# Patient Record
Sex: FEMALE | Race: WHITE | NOT HISPANIC OR LATINO | Employment: OTHER | URBAN - METROPOLITAN AREA
[De-identification: names, ages, dates, MRNs, and addresses within clinical notes are randomized per-mention and may not be internally consistent; named-entity substitution may affect disease eponyms.]

---

## 2018-09-24 ENCOUNTER — TELEPHONE (OUTPATIENT)
Dept: CARDIOLOGY | Facility: CLINIC | Age: 68
End: 2018-09-24

## 2018-09-24 NOTE — TELEPHONE ENCOUNTER
Referral- Heart Failure    DEMOGRAPHICS       Demographic Information on Edda Mckeon:    Edda Mckeon  Gender: female  : 1950  [address]  [facility] [id number] [phone] (home)     Medical Record: [id number]  Social Security Number: [id number]  Pharmacy: Data Unavailable  Primary Care Provider: Emi Monk    Parent's names are: Data Unavailable (mother) and Data Unavailable (father).    Insurance: Payor: BCBS / Plan: BCBS PLATINUM BLUE / Product Type: PPO /       REFERRAL INFORMATION       Referring Provider: Angelic Leonard  Referring Clinic: [facility]  Referring Contact Info: Clinic Phone: [phone] Fax: [phone]  Referring Diagnosis: Sever Persistent Asthma/bronchiectasis with MR, valvular heart disease.   Referring Information: Wondering how to manage heart disease.   Urgency of Referral: Non-Urgent  SPOC Notes: Keanu and Yola from  looked at this and figured it would be more a HF thing, so they forwarded information. Patient was referred to Dr. Sousa.     RECORDS REQUESTED FROM:       Clinic name Records Requested Records Status Imaging Status   [facility]  Received Received                   PROBLEM/MEDICATION LIST:           Patient Reported Sx          Symptom Questions  Answer  Additional Comments    Any Swelling   YES About 4 weeks ago is no longer.    Weight Changes (up or down)    no      Loss of Appetite/Bloated   YES     Nausea/Vomiting   no     Dizziness or Lightheadedness   YES     Fatigue   YES      Weakness     Yes      Tachycardia/Racing/Throbbing   no     Mental Dullness or Fogginess   YES     Cough/Hack   YES But attributes it to her bronchiectasis   Shortness of Breath   YES  But attributes it to her bronchiectasis   SOB Worse When Laying Down   no     Do you Sleep in Bed and If Yes How Many Pillows    1 Uses C-PAP   Chest Pain no    RED FLAG ITEMS     Ever been on IV inotropes no    Are they still on inotropes no    What's their must  current EF 65%    What's the LVIDd on echo 2.5cm    What's their last blood sodium 136    What's their most recent /85    What's their most recent HR 64    Hospitalizations X6m              PLAN:

## 2018-09-24 NOTE — TELEPHONE ENCOUNTER
[facility] Call Center    Phone Message    May a detailed message be left on voicemail: yes    Reason for Call: Other: Patient called in regarding a referral that was received on 9/17 from [facility]. Dr. Angelic Leonard is referring her to see Dr. Bonilla for 'severe ashtma/bronchiectasis with MR, valvular heart disease, ? how best ot manage heart disease'. The referral was forwarded to Cardiololgy for scheduling on 9/17, but patient has not heard anything yet. Please advise and reach out to patient directly for scheduling at [phone].     Action Taken: Message routed to:  [facility] Cardiology Clinic

## 2018-10-08 ENCOUNTER — PATIENT OUTREACH (OUTPATIENT)
Dept: CARE COORDINATION | Facility: CLINIC | Age: 68
End: 2018-10-08

## 2018-10-16 DIAGNOSIS — I34.0 MITRAL REGURGITATION: ICD-10-CM

## 2018-10-16 DIAGNOSIS — I50.9 CHF (CONGESTIVE HEART FAILURE) (H): ICD-10-CM

## 2018-10-16 DIAGNOSIS — I38 VALVULAR HEART DISEASE: Primary | ICD-10-CM

## 2018-10-16 DIAGNOSIS — R60.0 LOWER EXTREMITY EDEMA: ICD-10-CM

## 2018-10-16 DIAGNOSIS — J47.9 BRONCHIECTASIS (H): ICD-10-CM

## 2018-10-16 PROBLEM — J45.909 SEVERE ASTHMA: Status: ACTIVE | Noted: 2018-10-16

## 2018-10-22 ENCOUNTER — OFFICE VISIT (OUTPATIENT)
Dept: CARDIOLOGY | Facility: CLINIC | Age: 68
End: 2018-10-22
Attending: INTERNAL MEDICINE
Payer: MEDICARE

## 2018-10-22 VITALS
HEART RATE: 63 BPM | WEIGHT: 169.2 LBS | BODY MASS INDEX: 31.95 KG/M2 | HEIGHT: 61 IN | DIASTOLIC BLOOD PRESSURE: 78 MMHG | SYSTOLIC BLOOD PRESSURE: 137 MMHG | OXYGEN SATURATION: 95 %

## 2018-10-22 DIAGNOSIS — I34.0 MITRAL VALVE INSUFFICIENCY, UNSPECIFIED ETIOLOGY: Primary | ICD-10-CM

## 2018-10-22 DIAGNOSIS — I38 VALVULAR HEART DISEASE: ICD-10-CM

## 2018-10-22 DIAGNOSIS — I50.9 CHF (CONGESTIVE HEART FAILURE) (H): ICD-10-CM

## 2018-10-22 DIAGNOSIS — I50.32 CHRONIC DIASTOLIC HEART FAILURE (H): ICD-10-CM

## 2018-10-22 LAB
ANION GAP SERPL CALCULATED.3IONS-SCNC: 9 MMOL/L (ref 3–14)
BUN SERPL-MCNC: 10 MG/DL (ref 7–30)
CALCIUM SERPL-MCNC: 9 MG/DL (ref 8.5–10.1)
CHLORIDE SERPL-SCNC: 107 MMOL/L (ref 94–109)
CO2 SERPL-SCNC: 24 MMOL/L (ref 20–32)
CREAT SERPL-MCNC: 0.66 MG/DL (ref 0.52–1.04)
ERYTHROCYTE [DISTWIDTH] IN BLOOD BY AUTOMATED COUNT: 13.9 % (ref 10–15)
GFR SERPL CREATININE-BSD FRML MDRD: 88 ML/MIN/1.7M2
GLUCOSE SERPL-MCNC: 85 MG/DL (ref 70–99)
HCT VFR BLD AUTO: 43.5 % (ref 35–47)
HGB BLD-MCNC: 14 G/DL (ref 11.7–15.7)
MCH RBC QN AUTO: 32.5 PG (ref 26.5–33)
MCHC RBC AUTO-ENTMCNC: 32.2 G/DL (ref 31.5–36.5)
MCV RBC AUTO: 101 FL (ref 78–100)
NT-PROBNP SERPL-MCNC: 96 PG/ML (ref 0–125)
PLATELET # BLD AUTO: 212 10E9/L (ref 150–450)
POTASSIUM SERPL-SCNC: 4.4 MMOL/L (ref 3.4–5.3)
RBC # BLD AUTO: 4.31 10E12/L (ref 3.8–5.2)
SODIUM SERPL-SCNC: 139 MMOL/L (ref 133–144)
TSH SERPL DL<=0.005 MIU/L-ACNC: 3.83 MU/L (ref 0.4–4)
WBC # BLD AUTO: 8 10E9/L (ref 4–11)

## 2018-10-22 PROCEDURE — 99203 OFFICE O/P NEW LOW 30 MIN: CPT | Mod: GC | Performed by: INTERNAL MEDICINE

## 2018-10-22 PROCEDURE — G0463 HOSPITAL OUTPT CLINIC VISIT: HCPCS | Mod: ZF

## 2018-10-22 PROCEDURE — 80048 BASIC METABOLIC PNL TOTAL CA: CPT | Performed by: INTERNAL MEDICINE

## 2018-10-22 PROCEDURE — 83880 ASSAY OF NATRIURETIC PEPTIDE: CPT | Performed by: INTERNAL MEDICINE

## 2018-10-22 PROCEDURE — 85027 COMPLETE CBC AUTOMATED: CPT | Performed by: INTERNAL MEDICINE

## 2018-10-22 PROCEDURE — 84443 ASSAY THYROID STIM HORMONE: CPT | Performed by: INTERNAL MEDICINE

## 2018-10-22 RX ORDER — ONDANSETRON 4 MG/1
TABLET, ORALLY DISINTEGRATING ORAL
Refills: 0 | COMMUNITY
Start: 2017-11-19 | End: 2022-09-18

## 2018-10-22 RX ORDER — FUROSEMIDE 20 MG
TABLET ORAL
Refills: 0 | COMMUNITY
Start: 2018-08-30 | End: 2022-09-18

## 2018-10-22 RX ORDER — ROSUVASTATIN CALCIUM 40 MG/1
40 TABLET, COATED ORAL DAILY
COMMUNITY
Start: 2018-02-16

## 2018-10-22 RX ORDER — SODIUM CHLORIDE FOR INHALATION 3 %
VIAL, NEBULIZER (ML) INHALATION
Refills: 3 | COMMUNITY
Start: 2017-12-06 | End: 2022-09-18

## 2018-10-22 RX ORDER — CYANOCOBALAMIN (VITAMIN B-12) 500 MCG
LOZENGE ORAL
COMMUNITY
End: 2022-09-18

## 2018-10-22 RX ORDER — CHLORAL HYDRATE 500 MG
2 CAPSULE ORAL EVERY OTHER DAY
COMMUNITY

## 2018-10-22 RX ORDER — CHOLECALCIFEROL (VITAMIN D3) 50 MCG
2000 TABLET ORAL DAILY
COMMUNITY

## 2018-10-22 RX ORDER — POLYVINYL ALCOHOL 14 MG/ML
2 SOLUTION/ DROPS OPHTHALMIC PRN
COMMUNITY

## 2018-10-22 RX ORDER — VALACYCLOVIR HYDROCHLORIDE 1 G/1
1000 TABLET, FILM COATED ORAL
COMMUNITY
Start: 2011-11-14 | End: 2022-09-18

## 2018-10-22 RX ORDER — LEVOTHYROXINE SODIUM 75 UG/1
75 TABLET ORAL DAILY
COMMUNITY
Start: 2018-04-26

## 2018-10-22 RX ORDER — TRETINOIN 0.25 MG/G
CREAM TOPICAL
COMMUNITY
Start: 2013-11-07 | End: 2022-09-18

## 2018-10-22 RX ORDER — PREDNISONE 20 MG/1
TABLET ORAL
Refills: 0 | COMMUNITY
Start: 2018-06-06 | End: 2022-09-18

## 2018-10-22 RX ORDER — MULTIVITAMIN
1 TABLET ORAL DAILY
COMMUNITY
Start: 2007-12-03

## 2018-10-22 RX ORDER — NYSTATIN 100000/ML
SUSPENSION, ORAL (FINAL DOSE FORM) ORAL
Refills: 0 | COMMUNITY
Start: 2018-06-14 | End: 2022-09-18

## 2018-10-22 RX ORDER — IPRATROPIUM BROMIDE AND ALBUTEROL SULFATE 2.5; .5 MG/3ML; MG/3ML
3 SOLUTION RESPIRATORY (INHALATION)
COMMUNITY
Start: 2018-09-12 | End: 2022-09-18

## 2018-10-22 RX ORDER — ALBUTEROL SULFATE 90 UG/1
1-2 AEROSOL, METERED RESPIRATORY (INHALATION) EVERY 6 HOURS PRN
COMMUNITY

## 2018-10-22 RX ORDER — OXYCODONE AND ACETAMINOPHEN 5; 325 MG/1; MG/1
TABLET ORAL
Refills: 0 | COMMUNITY
Start: 2017-11-19 | End: 2022-09-18

## 2018-10-22 RX ORDER — FLUTICASONE PROPIONATE 50 MCG
2 SPRAY, SUSPENSION (ML) NASAL
COMMUNITY
Start: 2018-09-12 | End: 2022-09-18

## 2018-10-22 RX ORDER — CIPROFLOXACIN 500 MG/1
TABLET, FILM COATED ORAL
Refills: 0 | COMMUNITY
Start: 2017-11-19 | End: 2022-09-18

## 2018-10-22 RX ORDER — ASCORBIC ACID 500 MG
500 TABLET ORAL
COMMUNITY
End: 2022-09-18

## 2018-10-22 ASSESSMENT — PAIN SCALES - GENERAL: PAINLEVEL: MILD PAIN (2)

## 2018-10-22 NOTE — MR AVS SNAPSHOT
After Visit Summary   10/22/2018    Edda Mckeon    MRN: [id number]           Patient Information     Date Of Birth          1950        Visit Information        Provider Department      10/22/2018 1:00 PM Chad Bonilla MD [facility]        Today's Diagnoses     Mitral valve insufficiency, unspecified etiology    -  1    Diastolic heart failure (H)        Valvular heart disease        Carotid artery stenosis          Care Instructions    You were seen today in the Cardiovascular Clinic at the [facility].       Cardiology Providers you saw during your visit: Dr. Bonilla         Medication Changes:   1. No medication changes       Patient Instructions:  1. We will schedule you for the following testin: CardioPulmonary Stress Test (CPX)  CPX is a maximal (meaning you exercise to exhaustion, not to achieve a heart rate) exercise test where we measure how well your heart/body uses oxygen or energy. It is the gold standard for measuring functional capacity and helps us differentiate limitations due to lungs, heart, or fitness.   A CPX is NOT a typical stress test. You will NOT be asked to hold your Beta Blocker medication.   You will be scheduled for a CardioPulmonary Stress Test at the [facility] ([address], [facility] [address], [phone]).  Follow these instructions:    1. Report to the GOLD waiting room in the [facility] hospital.    2. Nothing to eat for 3 hours prior to your test. You may have clear liquids up to the time of your test    3. Please wear loose, two-piece clothing and comfortable, rubber soled shoes for walking     2. NM Lexiscan:     Cristal Nuclear Stress Test     A Cristal Nuclear Stress Test is an imaging test that measures the flow of blood in your heart at rest and then during exercise. The images are compared to determine whether there are any blockages in the arteries, changes in blood flow or oxygen supply from resting  to the stressed state, areas of scar tissue, or if there has been a prior heart attack. It also measures how well the heart muscle squeezes and pumps.     Unlike other stress tests, you will NOT hold your beta blocker medication for this test.     You are scheduled for a Cristal Stress Test at the [facility].   Please report to the [facility] Waiting Room in the [facility].     Follow these instructions:     This test can take up to 3 hours.     Nothing to eat or drink for 3 hours before test except for water.   No caffeine, tobacco, or alcohol for 12 hrs before test.   Do not take nitrates on the day of your test. Do not wear your Nitro-Patch    3. Carotid Ultrasound  4. Echocardiogram ( I will try to get the images from [facility] and if I am able to, I will cancel the echo and let you know).  5. We will check your thyroid from your labs today.    Follow up Appointment Information:  1. Come back in 4-6 weeks for follow up appointment to discuss results.      Results:    Results for MIGUEL MORRISSEY (MRN [id number]) as of 10/22/2018 14:10   Ref. Range 10/22/2018 12:01   Sodium Latest Ref Range: 133 - 144 mmol/L 139   Potassium Latest Ref Range: 3.4 - 5.3 mmol/L 4.4   Chloride Latest Ref Range: 94 - 109 mmol/L 107   Carbon Dioxide Latest Ref Range: 20 - 32 mmol/L 24   Urea Nitrogen Latest Ref Range: 7 - 30 mg/dL 10   Creatinine Latest Ref Range: 0.52 - 1.04 mg/dL 0.66   GFR Estimate Latest Ref Range: >60 mL/min/1.7m2 88   GFR Estimate If Black Latest Ref Range: >60 mL/min/1.7m2 >90   Calcium Latest Ref Range: 8.5 - 10.1 mg/dL 9.0   Anion Gap Latest Ref Range: 3 - 14 mmol/L 9   N-Terminal Pro Bnp Latest Ref Range: 0 - 125 pg/mL 96   Glucose Latest Ref Range: 70 - 99 mg/dL 85   WBC Latest Ref Range: 4.0 - 11.0 10e9/L 8.0   Hemoglobin Latest Ref Range: 11.7 - 15.7 g/dL 14.0   Hematocrit Latest Ref Range: 35.0 - 47.0 % 43.5   Platelet Count Latest Ref Range: 150 - 450 10e9/L 212   RBC Count  "Latest Ref Range: 3.8 - 5.2 10e12/L 4.31   MCV Latest Ref Range: 78 - 100 fl 101 (H)   MCH Latest Ref Range: 26.5 - 33.0 pg 32.5   MCHC Latest Ref Range: 31.5 - 36.5 g/dL 32.2   RDW Latest Ref Range: 10.0 - 15.0 % 13.9         [address] [facility] [place] [address] [place] [address]        Thank you for allowing us to be a part of your care here at the [facility]      If you have questions or concerns please contact us at:      Norah Guajardo RN BSN   Cardiology Care Coordinator  [facility]   Questions and [phone]   First press #1 for the University and then press #3 for \"Medical Advice\" to reach us Cardiology Nurses.                Follow-ups after your visit        Additional Services     Follow-Up with Advanced Heart Failure Cardiologist                 Your next 10 appointments already scheduled     2018  8:00 AM CDT   NM INJECTION with UUNMINJ1   [facility], Nuclear Medicine ([facility])    [address] [facility] [address]   [phone]            2018  8:45 AM CDT   NM SCAN3 with UUNM1   [facility], Nuclear Medicine ([facility])    [address] [facility] [address] [phone]            2018  9:15 AM CDT   Ekg Stress Nm Lexiscan with UUEKGNMS   [facility] ([facility])    [address] [place] [id number]               2018 10:15 AM CDT   NM MPI WITH LEXISCAN with UUNM1   [facility], Nuclear Medicine ([facility])    [address] [facility] [address] [phone]           How do I prepare for my exam? (Food and drink instructions) Day 1 & Day 2: Stop all caffeine 12 hours before the test. This includes coffee, tea, soda pop, chocolate and certain medicines (such as " Anacin, Excedrin and NoDoz). Also avoid decaf coffee and tea, as these contain small amounts of caffeine. Stop eating 4 hours before the test. You may drink water.  How do I prepare for my exam? (Other instructions) You may need to stop some medicines before the test. Follow your doctor s orders. Day 1 & Day 2: *If you take a beta blocker: Do not take your beta-blocker on the day before your test, unless specifically told to by your doctor. And do not take it on the day of your test. Bring it with you to take after the test.  *If you take Aggrenox or dipyridamole (Persantine, Permole), stop taking it 48 hours before your test. *If you take Viagra, Cialis or Levitra, stop taking it 48 hours before your test. *If you take theophylline or aminophylline, stop taking it 12 hours before your test.  For patients with diabetes: *If you take insulin, call your diabetes care team. Ask if you should take a 1/2 dose the morning of your test. *If you take diabetes medicine by mouth, don t take it on the morning of your test. Bring it with you to take after the test. (If you have questions, call your diabetes care team.)  *Do not take nitrates on the day of your test. Do not wear your Nitro-Patch. *No alcohol, smoking or other tobacco for 12 hours before the test.  What should I wear: Please wear a loose two-piece outfit. If you will have an exercise test, bring rubber-soled walking shoes.  How long does the exam take: *This test can take 1-2 days.* ONE day exam: Allow 3-4 hours for test. IF TWO day exam: Allow 30-60 minutes PER day for test.  What should I bring: Please bring a list of your medicines (including vitamins, minerals and over-the-counter drugs). Leave your valuables at home.  Do I need a :  No  is needed.  What do I need to tell my doctor? When you arrive, please tell us if you: * Have diabetes * Are breastfeeding * May be pregnant * Have a pacemaker of ICD (implantable defibrillator).  What should I do  after the exam: No restrictions, You may resume normal activities.  What is this test: Your doctor has ordered a nuclear stress test to check how well blood is flowing through your heart. You will either exercise or take a medicine that mimics exercise; we will watch your heart.  Who should I call with questions: If you have any questions, please call the Imaging Department where you will have your exam. Directions, parking instructions, and other information is available on our website, [email or web address].            Nov 06, 2018  8:30 AM CST   Card Cardpul Stress Tst Adult with UUEKGS   [facility] ([facility])    [address] [facility] [address]               Nov 06, 2018 10:30 AM CST   US CAROTID BILATERAL with UUUS2   [facility] ([facility])    [address] [facility] [id number] [phone]           How do I prepare for my exam? (Food and drink instructions) No Food and Drink Restrictions.  How do I prepare for my exam? (Other instructions) You do not need to do anything special to prepare for your exam.  What should I wear: Wear comfortable clothes.  How long does the exam take: Most ultrasounds take 30 to 60 minutes.  What should I bring: Bring a list of your medicines, including vitamins, minerals and over-the-counter drugs. It is safest to leave personal items at home.  Do I need a :  No  is needed.  What do I need to tell my doctor: Tell your doctor about any allergies you may have.  What should I do after the exam: No restrictions, You may resume normal activities.  What is this test: An ultrasound uses sound waves to make pictures of the body. Sound waves do not cause pain. The only discomfort may be the pressure of the wand against your skin or full bladder.  Who should I call with questions: If you have any questions, please call the Imaging Department  "where you will have your exam. Directions, parking instructions, and other information is available on our website, [email or web address].            Nov 26, 2018  4:00 PM CST   (Arrive by 3:45 PM)   Advanced Heart Failure with Chad Bonilla MD   [facility] ([facility])    [address] [address]   [phone]              Future tests that were ordered for you today     Open Future Orders        Priority Expected Expires Ordered    Follow-Up with Advanced Heart Failure Cardiologist Routine 11/26/2018 10/22/2019 10/22/2018    Card Cardiopulmonary Stress Test - Adult Routine 10/29/2018 10/22/2019 10/22/2018    Echocardiogram Complete Routine 10/29/2018 10/22/2019 10/22/2018    US Carotid Bilateral Routine 10/29/2018 10/22/2019 10/22/2018    Stress NM Lexiscan Routine 10/29/2018 10/22/2019 10/22/2018    TSH Add-On  10/22/2019 10/22/2018            Who to contact     If you have questions or need follow up information about today's clinic visit or your schedule please contact [facility] directly at [phone].  Normal or non-critical lab and imaging results will be communicated to you by MyChart, letter or phone within 4 business days after the clinic has received the results. If you do not hear from us within 7 days, please contact the clinic through MyChart or phone. If you have a critical or abnormal lab result, we will notify you by phone as soon as possible.  Submit refill requests through [employer] or call your pharmacy and they will forward the refill request to us. Please allow 3 business days for your refill to be completed.          Additional Information About Your Visit        [employer] Information     [employer] lets you send messages to your doctor, view your test results, renew your prescriptions, schedule appointments and more. To sign up, go to [email or web address] . Click on \"Log in\" on the left side of the screen, " "which will take you to the Welcome page. Then click on \"Sign up Now\" on the right side of the page.     You will be asked to enter the access code listed below, as well as some personal information. Please follow the directions to create your username and password.     Your access code is: [email or web address]  Expires: 2018  3:19 PM     Your access code will  in 90 days. If you need help or a new code, please call your [facility] or [phone].        Care EveryWhere ID     This is your Care EveryWhere ID. This could be used by other organizations to access your [place] medical records  [id number]        Your Vitals Were     Pulse Height Pulse Oximetry BMI (Body Mass Index)          63 1.537 m (5' 0.5\") 95% 32.5 kg/m2         Blood Pressure from Last 3 Encounters:   10/22/18 137/78    Weight from Last 3 Encounters:   10/22/18 76.7 kg (169 lb 3.2 oz)              Information about OPIOIDS     PRESCRIPTION OPIOIDS: WHAT YOU NEED TO KNOW   We gave you an opioid (narcotic) pain medicine. It is important to manage your pain, but opioids are not always the best choice. You should first try all the other options your care team gave you. Take this medicine for as short a time (and as few doses) as possible.    Some activities can increase your pain, such as bandage changes or therapy sessions. It may help to take your pain medicine 30 to 60 minutes before these activities. Reduce your stress by getting enough sleep, working on hobbies you enjoy and practicing relaxation or meditation. Talk to your care team about ways to manage your pain beyond prescription opioids.    These medicines have risks:    DO NOT drive when on new or higher doses of pain medicine. These medicines can affect your alertness and reaction times, and you could be arrested for driving under the influence (DUI). If you need to use opioids long-term, talk to your care team about driving.    DO NOT operate heavy machinery    DO NOT do any " other dangerous activities while taking these medicines.    DO NOT drink any alcohol while taking these medicines.     If the opioid prescribed includes acetaminophen, DO NOT take with any other medicines that contain acetaminophen. Read all labels carefully. Look for the word  acetaminophen  or  Tylenol.  Ask your pharmacist if you have questions or are unsure.    You can get addicted to pain medicines, especially if you have a history of addiction (chemical, alcohol or substance dependence). Talk to your care team about ways to reduce this risk.    All opioids tend to cause constipation. Drink plenty of water and eat foods that have a lot of fiber, such as fruits, vegetables, prune juice, apple juice and high-fiber cereal. Take a laxative (Miralax, milk of magnesia, Colace, Senna) if you don t move your bowels at least every other day. Other side effects include upset stomach, sleepiness, dizziness, throwing up, tolerance (needing more of the medicine to have the same effect), physical dependence and slowed breathing.    Store your pills in a secure place, locked if possible. We will not replace any lost or stolen medicine. If you don t finish your medicine, please throw away (dispose) as directed by your pharmacist. The [place] Pollution Control Agency has more information about safe disposal: [email or web address]-medications         Primary Care Provider Office Phone # Fax #    Emi Monk [phone]       [place] [address]        Equal Access to Services     LILIA MANRIQUE AH: Daniel dodge Sominna, waaxda luanderson, qaybta kaalmachandni garcia. So [facility] [phone].    ATENCIÓN: Si habla español, tiene a pettit disposición servicios gratuitos de asistencia lingüística. Llame al [phone].    We comply with applicable federal civil rights laws and  [place] laws. We do not discriminate on the basis of race, color, national origin, age, disability, sex, sexual orientation, or gender identity.            Thank you!     Thank you for choosing [facility]  for your care. Our goal is always to provide you with excellent care. Hearing back from our patients is one way we can continue to improve our services. Please take a few minutes to complete the written survey that you may receive in the mail after your visit with us. Thank you!             Your Updated Medication List - Protect others around you: Learn how to safely use, store and throw away your medicines at www.disposemymeds.org.          This list is accurate as of 10/22/18  3:16 PM.  Always use your most recent med list.                   Brand Name Dispense Instructions for use Diagnosis    albuterol 108 (90 Base) MCG/ACT inhaler    PROAIR HFA/PROVENTIL HFA/VENTOLIN HFA     Inhale 1-2 puffs into the lungs        ascorbic acid 500 MG Tabs      Take 500 mg by mouth        aspirin 81 MG tablet           ciprofloxacin 500 MG tablet    CIPRO     TK 1 T PO  BID FOR 5 DAYS        clotrimazole 10 MG Lozg lozenge    MYCELEX     SLOWLY DISSOLVE 1 T PO FID FOR 14 DAYS.        fish oil-omega-3 fatty acids 1000 MG capsule      Take 2 g by mouth        fluticasone 50 MCG/ACT spray    FLONASE     2 sprays        furosemide 20 MG tablet    LASIX     TK 1 T PO QAM FOR 3 DAYS.        ipratropium - albuterol 0.5 mg/2.5 mg/3 mL 0.5-2.5 (3) MG/3ML neb solution    DUONEB     Inhale 3 mLs into the lungs        levothyroxine 75 MCG tablet    SYNTHROID/LEVOTHROID     TAKE 1 TABLET BY MOUTH DAILY        mometasone furoate 200 MCG/ACT inhaler    ASMANEX HFA     Inhale 2 puffs into the lungs        Multiple vitamin Tabs           nystatin 979444 UNIT/ML suspension    MYCOSTATIN     SAS 5 ML PO BID FOR 7 DAYS        ondansetron 4 MG ODT tab    ZOFRAN-ODT     DISSOLVE ONE T ON TONGUE AND SWALLOW Q 8 H PRF NAUSEA OR VOMITING         order for DME      Oxygen for home use. 2 liters per NC during daytime, 4 at night. Frequency: Continuous with portability.; Length of need: 99  Months.        oxyCODONE-acetaminophen 5-325 MG per tablet    PERCOCET     TK ONE TO TWO TS PO Q 6 H PRF PAIN. NM4        polyvinyl alcohol 1.4 % ophthalmic solution    LIQUIFILM TEARS          predniSONE 20 MG tablet    DELTASONE          rosuvastatin 40 MG tablet    CRESTOR     Take 40 mg by mouth        sodium chloride 3 % Nebu neb solution      INHALE 1 VIAL BY MOUTH VIA NEBULIZER BID        tiotropium 2.5 MCG/ACT inhalation aerosol    SPIRIVA RESPIMAT     Inhale 2 puffs into the lungs        tretinoin 0.025 % cream    RETIN-A     Apply to affected areas on cheek in the evenings daily        Turmeric 450 MG Caps           valACYclovir 1000 mg tablet    VALTREX     Take 1,000 mg by mouth        vitamin D 2000 units tablet      Take 2,000 Units by mouth        vitamin E 400 units Tabs      1 Cap daily.

## 2018-10-22 NOTE — PROGRESS NOTES
October 22, 2018      Angelic Leonard MD   [facility]   [facility]    [address] [place] [address]    [phone]        Dear Dr. Leonard,    Thank you for referring Ms. Edda Mckeon to the Heart Failure Clinic at the [facility]. As you know, Edda Mckeon is 68 year old female with the following past medical history:    1. Severe asthma  2. Bronchiectasis  3. Obstructive sleep apnea, compliant with CPAP  4. Obesity  5. Hypertension  6. Hyperlipidemia  7. Diastolic dysfunction  8. Moderate mitral regurgitation  9. Hypothyroidism    She presents for further evaluation to determine whether there is a cardiac etiology to her dyspnea, fatigue, and lightheadedness.    Ms. Mckeon notes that that her breathing has worsened over the last couple of years. Her main concern is that over the last 3-6 months, she has had worsening fatigue and weakness, intermittent abdominal discomfort, back pain, and chest pain for 1-2 hours at night when laying in bed, and 2-3 episodes of lightheadedness that are not associated with rising from a sitting to standing position and have unknown triggers. No loss of consciousness or falls. Her other major concern is that she has had one episode where she recently woke up in the middle of the night with whole body shakes. Endorses having shortness of breath when laying supine without her CPAP; otherwise, when using her CPAP with supplemental oxygen, she is able to lay supine. Denies PND, weight gain/loss, and chest pain with exertion. She presented to [place] on 8/30/18 with pedal edema and was prescribed Lasix. She only took Lasix for 3 days without any recurrence of pedal edema.     She is also concerned about her carotid blood flow. Her daughter has been diagnosed with Moyamoya and has had numerous CVA. 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 Ms. Mckeon also notes that she has had many stressors recently  including her mother and father passing and her  recently being diagnosed with prostate cancer. She notes that her mood is depressed and occasionally, she has passive suicidal action. No active plan to hurt herself or others.    She has previously been seen by Dr. Ariadne Boss at [facility], last seen in 10/2017. At that time, the patient's chronic dyspnea was thought to be secondary to asthma, obesity, deconditioning, and/or diastolic heart failure.    She had an echocardiogram at [facility] on 9/17/18 which showed EF 60%, hypokinesis of the mid into distal inferolateral wall (new compared to prior echocardiograms), probably diastolic dysfunction, moderate mitral regurgitation, mild to moderate pulmonic regurgitation, and mild tricuspid regurgitation. Mild to moderate mitral regurgitation has been present on her echocardiograms since 2010. She had a Lexiscan in 2013 which was negative for evidence of ischemia or infarction, EF 60%.    She otherwise denies fevers, chills, abdominal discomfort during the day, nausea, emesis, diarrhea, constipation, dysuria, and hematuria. Has had loss of appetite. Remainder of 10 point ROS negative.      Past Surgical History  Caesarian Section    Family History  No family history of myocardial infarction.  Father - palpitations, CVA  Mother - CVA, had atrial fibrillation ablation  Daughter - Moyamoya    Social History  Patient is currently retired. Was a . Occasionally volunteers.  Social History   Substance Use Topics     Smoking status: Never Smoker     Smokeless tobacco: Never Used     Alcohol use Yes      Comment: occs         Current Outpatient Prescriptions   Medication Sig     albuterol (PROAIR HFA/PROVENTIL HFA/VENTOLIN HFA) 108 (90 Base) MCG/ACT inhaler Inhale 1-2 puffs into the lungs     ascorbic acid 500 MG TABS Take 500 mg by mouth     aspirin 81 MG tablet      Cholecalciferol (VITAMIN D) 2000 units tablet Take 2,000 Units by mouth  "    ciprofloxacin (CIPRO) 500 MG tablet TK 1 T PO  BID FOR 5 DAYS     clotrimazole (MYCELEX) 10 MG LOZG lozenge SLOWLY DISSOLVE 1 T PO FID FOR 14 DAYS.     fish oil-omega-3 fatty acids 1000 MG capsule Take 2 g by mouth     fluticasone (FLONASE) 50 MCG/ACT spray 2 sprays     furosemide (LASIX) 20 MG tablet TK 1 T PO QAM FOR 3 DAYS.     ipratropium - albuterol 0.5 mg/2.5 mg/3 mL (DUONEB) 0.5-2.5 (3) MG/3ML neb solution Inhale 3 mLs into the lungs     levothyroxine (SYNTHROID/LEVOTHROID) 75 MCG tablet TAKE 1 TABLET BY MOUTH DAILY     mometasone furoate (ASMANEX HFA) 200 MCG/ACT inhaler Inhale 2 puffs into the lungs     Multiple vitamin TABS      nystatin (MYCOSTATIN) 404040 UNIT/ML suspension SAS 5 ML PO BID FOR 7 DAYS     ondansetron (ZOFRAN-ODT) 4 MG ODT tab DISSOLVE ONE T ON TONGUE AND SWALLOW Q 8 H PRF NAUSEA OR VOMITING     order for DME Oxygen for home use. 2 liters per NC during daytime, 4 at night. Frequency: Continuous with portability.; Length of need: 99  Months.     oxyCODONE-acetaminophen (PERCOCET) 5-325 MG per tablet TK ONE TO TWO TS PO Q 6 H PRF PAIN. NM4     polyvinyl alcohol (LIQUIFILM TEARS) 1.4 % ophthalmic solution      predniSONE (DELTASONE) 20 MG tablet      rosuvastatin (CRESTOR) 40 MG tablet Take 40 mg by mouth     sodium chloride 3 % NEBU neb solution INHALE 1 VIAL BY MOUTH VIA NEBULIZER BID     tiotropium (SPIRIVA RESPIMAT) 2.5 MCG/ACT inhalation aerosol Inhale 2 puffs into the lungs     tretinoin (RETIN-A) 0.025 % cream Apply to affected areas on cheek in the evenings daily     Turmeric 450 MG CAPS      valACYclovir (VALTREX) 1000 mg tablet Take 1,000 mg by mouth     vitamin E 400 units TABS 1 Cap daily.     No current facility-administered medications for this visit.          ROS:   10 point ROS negative other than what is discussed in above HPI.    EXAM:   /78 (BP Location: Right arm, Patient Position: Chair, Cuff Size: Adult Large)  Pulse 63  Ht 1.537 m (5' 0.5\")  Wt 76.7 kg (169 " lb 3.2 oz)  SpO2 95%  BMI 32.5 kg/m2   GENERAL: Alert, oriented, NAD  HEENT:  NC/AT. PERRLA.  EOMI.  Sclerae white.  MMM, no oral lesions  NECK: No JVD   HEART: Distant heart sounds, RRR, normal S1 and S2, unable to appreciate murmurs due to body habitus  LUNGS: Bibasilar crackles, intermittent expiratory wheezing  ABDOMEN: Obese, soft, nontender, nondistended, bowel sounds present, no ascites.  EXTREMITIES: No lower extremity edema.    Labs:    Recent Results (from the past 24 hour(s))   Basic metabolic panel    Collection Time: 10/22/18 12:01 PM   Result Value Ref Range    Sodium 139 133 - 144 mmol/L    Potassium 4.4 3.4 - 5.3 mmol/L    Chloride 107 94 - 109 mmol/L    Carbon Dioxide 24 20 - 32 mmol/L    Anion Gap 9 3 - 14 mmol/L    Glucose 85 70 - 99 mg/dL    Urea Nitrogen 10 7 - 30 mg/dL    Creatinine 0.66 0.52 - 1.04 mg/dL    GFR Estimate 88 >60 mL/min/1.7m2    GFR Estimate If Black >90 >60 mL/min/1.7m2    Calcium 9.0 8.5 - 10.1 mg/dL   CBC with platelets    Collection Time: 10/22/18 12:01 PM   Result Value Ref Range    WBC 8.0 4.0 - 11.0 10e9/L    RBC Count 4.31 3.8 - 5.2 10e12/L    Hemoglobin 14.0 11.7 - 15.7 g/dL    Hematocrit 43.5 35.0 - 47.0 %     (H) 78 - 100 fl    MCH 32.5 26.5 - 33.0 pg    MCHC 32.2 31.5 - 36.5 g/dL    RDW 13.9 10.0 - 15.0 %    Platelet Count 212 150 - 450 10e9/L   N terminal pro BNP outpatient    Collection Time: 10/22/18 12:01 PM   Result Value Ref Range    N-Terminal Pro Bnp 96 0 - 125 pg/mL         Echocardiogram 9/17/18 from [facility] (unable to view images):     CONCLUSION:    Echo 2018-09-17 13:51.     Technically difficult exam.     Sinus rhythm during study.     Normal LV size and systolic function. Hypokinesis of mid into distal     inferolateral wall is suggested.     Normal RV size and function.     Mild LA dilatation.     Moderate mitral regurgitation.     Mild to moderate pulmonic regurgitation.     Mild tricuspid regurgitation.     Mildly dilated ascending  aorta.     Compared to image review of study dated 10/4/2017, no signficant     interval changes noted.     Left Ventricle:     Normal LV size and systolic function. Normal                         left ventricular wall thickness. Diastolic filling                         pattern is indeterminate.    Right Ventricle:    Normal RV size and function.    Left Atrium:        Mild LA dilatation.    Right Atrium:       Normal RA size.    Aortic Valve:       Sclerotic aortic valve, no significant stenosis.                         Trivial regurgitation.    Mitral Valve:       Moderately calcified mitral annulus. Non-                        specific thickening of mitral valve leaflets.                         Moderate mitral regurgitation.    Tricuspid Valve:    Mild tricuspid regurgitation. Estimated RV                         systolic pressure= 21 mmHg + right atrial                         pressure.    Pulmonic Valve:     Mild to moderate pulmonic regurgitation.    Aorta:              Mildly dilated ascending aorta (measures 3.8                         cm.).    Pericardium:        No gross pericardial effusion.    IVC:                Normal IVC.    IAS:                Interatrial septum not well visualized.         Assessment and Plan:   Edda Mckeon is 68 year old female with a history of severe asthma, bronchiectasis, LAILA on CPAP, obesity, hypertension, hyperlipidemia, diastolic dysfunction, moderate mitral regurgitation, and hypothyroidism who presents for further evaluation of her fatigue, lightheadedness, and other various somatic symptoms (e.g. chest and back pain).     1. Hypokinesis of mid into distal inferolateral wall  2. Diastolic dysfunction  3. Moderate mitral regurgitation  4. Hypertension  5. Obesity  6. Depression    Patient appears to be NYHA functional class II. She appears euvolemic. She does not have a JVD or lower extremity edema. NTproBNP 96 today. She presents with various somatic symptoms which  may not all be due to a cardiac etiology. Her mitral regurgitation has been mild-moderate since 2010 and is unlikely to be causing her current symptoms. Her echocardiogram at [facility] on 9/17/19 showed new hypokinesis of the mid into distal inferolateral wall. Unfortunately, we are unable to view the echocardiogram images. We have requested for the patient to try to obtain the outside echocardiogram images or repeat the echocardiogram at the [facility]. Since there was a new area of hypokinesis seen in the most recent echocardiogram at [facility], will get a Lexiscan. Will also get a cardiopulmonary stress test to assess what component of her fatigue and dyspnea are secondary to a respiratory and/or cardiovascular etiology. Will also order a bilateral carotid ultrasound per her request.    Differential diagnoses for her fatigue and various other somatic symptoms include cardiomyopathy (possibly ischemic), valvular (unlikely since MR has been stable), diastolic dysfunction, respiratory (asthma/emphysema), depression, deconditioning, and hypothyroidism. Will also check TSH since it appears to have most recently been checked in 10/2016.    RTC in 4-6 weeks.    Patient seen and discussed with Dr. Bonilla.      Milly Almendarez MD, PhD  Cardiology Fellow  [phone]      I have personally seen and examined the patient, and then discussed with Dr. Almendarez, and agree with the findings and plan in this note. I have reviewed today's vital signs, medications, labs, and imaging.     Sincerely,    Chad Bonilla MD   Center for Pulmonary Hypertension  Section of Advanced Heart Failure   Cardiovascular Division  [facility]   [phone]      CC  Patient Care Team:  Emi Monk MD as PCP - General (Internal Medicine)  Angelic Leonard as Referring Physician (Pulmonary)  Chad Bonilla MD as MD (Cardiology)  Norah Guajardo, ALICIA as Nurse Coordinator (Cardiology)  CAROLYN  MARSHALL

## 2018-10-22 NOTE — LETTER
10/22/2018      RE: Edda Mckeon  [address]  [facility] [address]       October 22, 2018      Angelic Leonard MD   [facility]   [facility]    [address] [place] [address]    [phone]        Dear Dr. Leonard,    Thank you for referring Ms. Edda Mckeon to the Heart Failure Clinic at the [facility]. As you know, Edda Mckeon is 68 year old female with the following past medical history:    1. Severe asthma  2. Bronchiectasis  3. Obstructive sleep apnea, compliant with CPAP  4. Obesity  5. Hypertension  6. Hyperlipidemia  7. Diastolic dysfunction  8. Moderate mitral regurgitation  9. Hypothyroidism    She presents for further evaluation to determine whether there is a cardiac etiology to her dyspnea, fatigue, and lightheadedness.    Ms. Mckeon notes that that her breathing has worsened over the last couple of years. Her main concern is that over the last 3-6 months, she has had worsening fatigue and weakness, intermittent abdominal discomfort, back pain, and chest pain for 1-2 hours at night when laying in bed, and 2-3 episodes of lightheadedness that are not associated with rising from a sitting to standing position and have unknown triggers. No loss of consciousness or falls. Her other major concern is that she has had one episode where she recently woke up in the middle of the night with whole body shakes. Endorses having shortness of breath when laying supine without her CPAP; otherwise, when using her CPAP with supplemental oxygen, she is able to lay supine. Denies PND, weight gain/loss, and chest pain with exertion. She presented to [place] on 8/30/18 with pedal edema and was prescribed Lasix. She only took Lasix for 3 days without any recurrence of pedal edema.     She is also concerned about her carotid blood flow. Her daughter has been diagnosed with Moyamoya and has had numerous CVA. 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 Ms. Mckeon also notes that she has had many stressors recently including her mother and father passing and her  recently being diagnosed with prostate cancer. She notes that her mood is depressed and occasionally, she has passive suicidal action. No active plan to hurt herself or others.    She has previously been seen by Dr. Ariadne Boss at [facility], last seen in 10/2017. At that time, the patient's chronic dyspnea was thought to be secondary to asthma, obesity, deconditioning, and/or diastolic heart failure.    She had an echocardiogram at [facility] on 9/17/18 which showed EF 60%, hypokinesis of the mid into distal inferolateral wall (new compared to prior echocardiograms), probably diastolic dysfunction, moderate mitral regurgitation, mild to moderate pulmonic regurgitation, and mild tricuspid regurgitation. Mild to moderate mitral regurgitation has been present on her echocardiograms since 2010. She had a Lexiscan in 2013 which was negative for evidence of ischemia or infarction, EF 60%.    She otherwise denies fevers, chills, abdominal discomfort during the day, nausea, emesis, diarrhea, constipation, dysuria, and hematuria. Has had loss of appetite. Remainder of 10 point ROS negative.      Past Surgical History  Caesarian Section    Family History  No family history of myocardial infarction.  Father - palpitations, CVA  Mother - CVA, had atrial fibrillation ablation  Daughter - Moyamoya    Social History  Patient is currently retired. Was a . Occasionally volunteers.  Social History   Substance Use Topics     Smoking status: Never Smoker     Smokeless tobacco: Never Used     Alcohol use Yes      Comment: occs         Current Outpatient Prescriptions   Medication Sig     albuterol (PROAIR HFA/PROVENTIL HFA/VENTOLIN HFA) 108 (90 Base) MCG/ACT inhaler Inhale 1-2 puffs into the lungs     ascorbic acid 500 MG TABS Take 500 mg by mouth     aspirin 81 MG  tablet      Cholecalciferol (VITAMIN D) 2000 units tablet Take 2,000 Units by mouth     ciprofloxacin (CIPRO) 500 MG tablet TK 1 T PO  BID FOR 5 DAYS     clotrimazole (MYCELEX) 10 MG LOZG lozenge SLOWLY DISSOLVE 1 T PO FID FOR 14 DAYS.     fish oil-omega-3 fatty acids 1000 MG capsule Take 2 g by mouth     fluticasone (FLONASE) 50 MCG/ACT spray 2 sprays     furosemide (LASIX) 20 MG tablet TK 1 T PO QAM FOR 3 DAYS.     ipratropium - albuterol 0.5 mg/2.5 mg/3 mL (DUONEB) 0.5-2.5 (3) MG/3ML neb solution Inhale 3 mLs into the lungs     levothyroxine (SYNTHROID/LEVOTHROID) 75 MCG tablet TAKE 1 TABLET BY MOUTH DAILY     mometasone furoate (ASMANEX HFA) 200 MCG/ACT inhaler Inhale 2 puffs into the lungs     Multiple vitamin TABS      nystatin (MYCOSTATIN) 760091 UNIT/ML suspension SAS 5 ML PO BID FOR 7 DAYS     ondansetron (ZOFRAN-ODT) 4 MG ODT tab DISSOLVE ONE T ON TONGUE AND SWALLOW Q 8 H PRF NAUSEA OR VOMITING     order for DME Oxygen for home use. 2 liters per NC during daytime, 4 at night. Frequency: Continuous with portability.; Length of need: 99  Months.     oxyCODONE-acetaminophen (PERCOCET) 5-325 MG per tablet TK ONE TO TWO TS PO Q 6 H PRF PAIN. NM4     polyvinyl alcohol (LIQUIFILM TEARS) 1.4 % ophthalmic solution      predniSONE (DELTASONE) 20 MG tablet      rosuvastatin (CRESTOR) 40 MG tablet Take 40 mg by mouth     sodium chloride 3 % NEBU neb solution INHALE 1 VIAL BY MOUTH VIA NEBULIZER BID     tiotropium (SPIRIVA RESPIMAT) 2.5 MCG/ACT inhalation aerosol Inhale 2 puffs into the lungs     tretinoin (RETIN-A) 0.025 % cream Apply to affected areas on cheek in the evenings daily     Turmeric 450 MG CAPS      valACYclovir (VALTREX) 1000 mg tablet Take 1,000 mg by mouth     vitamin E 400 units TABS 1 Cap daily.     No current facility-administered medications for this visit.          ROS:   10 point ROS negative other than what is discussed in above HPI.    EXAM:   /78 (BP Location: Right arm, Patient  "Position: Chair, Cuff Size: Adult Large)  Pulse 63  Ht 1.537 m (5' 0.5\")  Wt 76.7 kg (169 lb 3.2 oz)  SpO2 95%  BMI 32.5 kg/m2   GENERAL: Alert, oriented, NAD  HEENT:  NC/AT. PERRLA.  EOMI.  Sclerae white.  MMM, no oral lesions  NECK: No JVD   HEART: Distant heart sounds, RRR, normal S1 and S2, unable to appreciate murmurs due to body habitus  LUNGS: Bibasilar crackles, intermittent expiratory wheezing  ABDOMEN: Obese, soft, nontender, nondistended, bowel sounds present, no ascites.  EXTREMITIES: No lower extremity edema.    Labs:    Recent Results (from the past 24 hour(s))   Basic metabolic panel    Collection Time: 10/22/18 12:01 PM   Result Value Ref Range    Sodium 139 133 - 144 mmol/L    Potassium 4.4 3.4 - 5.3 mmol/L    Chloride 107 94 - 109 mmol/L    Carbon Dioxide 24 20 - 32 mmol/L    Anion Gap 9 3 - 14 mmol/L    Glucose 85 70 - 99 mg/dL    Urea Nitrogen 10 7 - 30 mg/dL    Creatinine 0.66 0.52 - 1.04 mg/dL    GFR Estimate 88 >60 mL/min/1.7m2    GFR Estimate If Black >90 >60 mL/min/1.7m2    Calcium 9.0 8.5 - 10.1 mg/dL   CBC with platelets    Collection Time: 10/22/18 12:01 PM   Result Value Ref Range    WBC 8.0 4.0 - 11.0 10e9/L    RBC Count 4.31 3.8 - 5.2 10e12/L    Hemoglobin 14.0 11.7 - 15.7 g/dL    Hematocrit 43.5 35.0 - 47.0 %     (H) 78 - 100 fl    MCH 32.5 26.5 - 33.0 pg    MCHC 32.2 31.5 - 36.5 g/dL    RDW 13.9 10.0 - 15.0 %    Platelet Count 212 150 - 450 10e9/L   N terminal pro BNP outpatient    Collection Time: 10/22/18 12:01 PM   Result Value Ref Range    N-Terminal Pro Bnp 96 0 - 125 pg/mL         [facility] (unable to view images):     CONCLUSION:    Echo 2018-09-17 13:51.     Technically difficult exam.     Sinus rhythm during study.     Normal LV size and systolic function. Hypokinesis of mid into distal     inferolateral wall is suggested.     Normal RV size and function.     Mild LA dilatation.     Moderate mitral regurgitation.     Mild to " moderate pulmonic regurgitation.     Mild tricuspid regurgitation.     Mildly dilated ascending aorta.     Compared to image review of study dated 10/4/2017, no signficant     interval changes noted.     Left Ventricle:     Normal LV size and systolic function. Normal                         left ventricular wall thickness. Diastolic filling                         pattern is indeterminate.    Right Ventricle:    Normal RV size and function.    Left Atrium:        Mild LA dilatation.    Right Atrium:       Normal RA size.    Aortic Valve:       Sclerotic aortic valve, no significant stenosis.                         Trivial regurgitation.    Mitral Valve:       Moderately calcified mitral annulus. Non-                        specific thickening of mitral valve leaflets.                         Moderate mitral regurgitation.    Tricuspid Valve:    Mild tricuspid regurgitation. Estimated RV                         systolic pressure= 21 mmHg + right atrial                         pressure.    Pulmonic Valve:     Mild to moderate pulmonic regurgitation.    Aorta:              Mildly dilated ascending aorta (measures 3.8                         cm.).    Pericardium:        No gross pericardial effusion.    IVC:                Normal IVC.    IAS:                Interatrial septum not well visualized.         Assessment and Plan:   Edda Mckeon is 68 year old female with a history of severe asthma, bronchiectasis, LAILA on CPAP, obesity, hypertension, hyperlipidemia, diastolic dysfunction, moderate mitral regurgitation, and hypothyroidism who presents for further evaluation of her fatigue, lightheadedness, and other various somatic symptoms (e.g. chest and back pain).     1. Hypokinesis of mid into distal inferolateral wall  2. Diastolic dysfunction  3. Moderate mitral regurgitation  4. Hypertension  5. Obesity  6. Depression    Patient appears to be NYHA functional class II. She appears euvolemic. She does not have a JVD  or lower extremity edema. NTproBNP 96 today. She presents with various somatic symptoms which may not all be due to a cardiac etiology. Her mitral regurgitation has been mild-moderate since 2010 and is unlikely to be causing her current symptoms. Her echocardiogram at [facility] on 9/17/19 showed new hypokinesis of the mid into distal inferolateral wall. Unfortunately, we are unable to view the echocardiogram images. We have requested for the patient to try to obtain the outside echocardiogram images or repeat the echocardiogram at the [facility]. Since there was a new area of hypokinesis seen in the most recent echocardiogram at [facility], will get a Lexiscan. Will also get a cardiopulmonary stress test to assess what component of her fatigue and dyspnea are secondary to a respiratory and/or cardiovascular etiology. Will also order a bilateral carotid ultrasound per her request.    Differential diagnoses for her fatigue and various other somatic symptoms include cardiomyopathy (possibly ischemic), valvular (unlikely since MR has been stable), diastolic dysfunction, respiratory (asthma/emphysema), depression, deconditioning, and hypothyroidism. Will also check TSH since it appears to have most recently been checked in 10/2016.    RTC in 4-6 weeks.    Patient seen and discussed with Dr. Bonilla.      Milly Almendarez MD, PhD  Cardiology Fellow  [phone]      I have personally seen and examined the patient, and then discussed with Dr. Almendarez, and agree with the findings and plan in this note. I have reviewed today's vital signs, medications, labs, and imaging.     Sincerely,    Chad Bonilla MD   Center for Pulmonary Hypertension  Section of Advanced Heart Failure   Cardiovascular Division  [facility]   [phone]      CC  Patient Care Team:  Emi Monk MD as PCP - General (Internal Medicine)  Angelic Leonard as Referring Physician (Pulmonary)  Chad  MD Chad as MD (Cardiology)  Norah Guajardo RN as Nurse Coordinator (Cardiology)  MARSHALL LEON MD

## 2018-10-22 NOTE — LETTER
10/22/2018      RE: Edda Mckeon  [address]  [facility] [address]       Dear Colleague,    Thank you for the opportunity to participate in the care of your patient, Edda Mckeon, at the [facility]. Please see a copy of my visit note below.    October 22, 2018      Angelic Leonard MD   [facility]   [facility]    [address] [place] [address]    [phone]        Dear Dr. Leonard,    Thank you for referring Ms. Edda Mckeon to the Heart Failure Clinic at the [facility]. As you know, Edda Mckeon is 68 year old female with the following past medical history:    1. Severe asthma  2. Bronchiectasis  3. Obstructive sleep apnea, compliant with CPAP  4. Obesity  5. Hypertension  6. Hyperlipidemia  7. Diastolic dysfunction  8. Moderate mitral regurgitation  9. Hypothyroidism    She presents for further evaluation to determine whether there is a cardiac etiology to her dyspnea, fatigue, and lightheadedness.    Ms. Mckeon notes that that her breathing has worsened over the last couple of years. Her main concern is that over the last 3-6 months, she has had worsening fatigue and weakness, intermittent abdominal discomfort, back pain, and chest pain for 1-2 hours at night when laying in bed, and 2-3 episodes of lightheadedness that are not associated with rising from a sitting to standing position and have unknown triggers. No loss of consciousness or falls. Her other major concern is that she has had one episode where she recently woke up in the middle of the night with whole body shakes. Endorses having shortness of breath when laying supine without her CPAP; otherwise, when using her CPAP with supplemental oxygen, she is able to lay supine. Denies PND, weight gain/loss, and chest pain with exertion. She presented to [place] on 8/30/18 with pedal edema and was prescribed Lasix. She only took  Lasix for 3 days without any recurrence of pedal edema.     She is also concerned about her carotid blood flow. Her daughter has been diagnosed with Moyamoya and has had numerous CVA. 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 Ms. Mckeon also notes that she has had many stressors recently including her mother and father passing and her  recently being diagnosed with prostate cancer. She notes that her mood is depressed and occasionally, she has passive suicidal action. No active plan to hurt herself or others.    She has previously been seen by Dr. Ariadne Boss at [facility], last seen in 10/2017. At that time, the patient's chronic dyspnea was thought to be secondary to asthma, obesity, deconditioning, and/or diastolic heart failure.    She had an echocardiogram at [facility] on 9/17/18 which showed EF 60%, hypokinesis of the mid into distal inferolateral wall (new compared to prior echocardiograms), probably diastolic dysfunction, moderate mitral regurgitation, mild to moderate pulmonic regurgitation, and mild tricuspid regurgitation. Mild to moderate mitral regurgitation has been present on her echocardiograms since 2010. She had a Lexiscan in 2013 which was negative for evidence of ischemia or infarction, EF 60%.    She otherwise denies fevers, chills, abdominal discomfort during the day, nausea, emesis, diarrhea, constipation, dysuria, and hematuria. Has had loss of appetite. Remainder of 10 point ROS negative.      Past Surgical History  Caesarian Section    Family History  No family history of myocardial infarction.  Father - palpitations, CVA  Mother - CVA, had atrial fibrillation ablation  Daughter - Moyamoya    Social History  Patient is currently retired. Was a . Occasionally volunteers.  Social History   Substance Use Topics     Smoking status: Never Smoker     Smokeless tobacco: Never Used     Alcohol use Yes      Comment: occs          Current Outpatient Prescriptions   Medication Sig     albuterol (PROAIR HFA/PROVENTIL HFA/VENTOLIN HFA) 108 (90 Base) MCG/ACT inhaler Inhale 1-2 puffs into the lungs     ascorbic acid 500 MG TABS Take 500 mg by mouth     aspirin 81 MG tablet      Cholecalciferol (VITAMIN D) 2000 units tablet Take 2,000 Units by mouth     ciprofloxacin (CIPRO) 500 MG tablet TK 1 T PO  BID FOR 5 DAYS     clotrimazole (MYCELEX) 10 MG LOZG lozenge SLOWLY DISSOLVE 1 T PO FID FOR 14 DAYS.     fish oil-omega-3 fatty acids 1000 MG capsule Take 2 g by mouth     fluticasone (FLONASE) 50 MCG/ACT spray 2 sprays     furosemide (LASIX) 20 MG tablet TK 1 T PO QAM FOR 3 DAYS.     ipratropium - albuterol 0.5 mg/2.5 mg/3 mL (DUONEB) 0.5-2.5 (3) MG/3ML neb solution Inhale 3 mLs into the lungs     levothyroxine (SYNTHROID/LEVOTHROID) 75 MCG tablet TAKE 1 TABLET BY MOUTH DAILY     mometasone furoate (ASMANEX HFA) 200 MCG/ACT inhaler Inhale 2 puffs into the lungs     Multiple vitamin TABS      nystatin (MYCOSTATIN) 376739 UNIT/ML suspension SAS 5 ML PO BID FOR 7 DAYS     ondansetron (ZOFRAN-ODT) 4 MG ODT tab DISSOLVE ONE T ON TONGUE AND SWALLOW Q 8 H PRF NAUSEA OR VOMITING     order for DME Oxygen for home use. 2 liters per NC during daytime, 4 at night. Frequency: Continuous with portability.; Length of need: 99  Months.     oxyCODONE-acetaminophen (PERCOCET) 5-325 MG per tablet TK ONE TO TWO TS PO Q 6 H PRF PAIN. NM4     polyvinyl alcohol (LIQUIFILM TEARS) 1.4 % ophthalmic solution      predniSONE (DELTASONE) 20 MG tablet      rosuvastatin (CRESTOR) 40 MG tablet Take 40 mg by mouth     sodium chloride 3 % NEBU neb solution INHALE 1 VIAL BY MOUTH VIA NEBULIZER BID     tiotropium (SPIRIVA RESPIMAT) 2.5 MCG/ACT inhalation aerosol Inhale 2 puffs into the lungs     tretinoin (RETIN-A) 0.025 % cream Apply to affected areas on cheek in the evenings daily     Turmeric 450 MG CAPS      valACYclovir (VALTREX) 1000 mg tablet Take 1,000 mg by mouth      "vitamin E 400 units TABS 1 Cap daily.     No current facility-administered medications for this visit.      EXAM:   /78 (BP Location: Right arm, Patient Position: Chair, Cuff Size: Adult Large)  Pulse 63  Ht 1.537 m (5' 0.5\")  Wt 76.7 kg (169 lb 3.2 oz)  SpO2 95%  BMI 32.5 kg/m2   GENERAL: Alert, oriented, NAD  HEENT:  NC/AT. PERRLA.  EOMI.  Sclerae white.  MMM, no oral lesions  NECK: No JVD   HEART: Distant heart sounds, RRR, normal S1 and S2, unable to appreciate murmurs due to body habitus  LUNGS: Bibasilar crackles, intermittent expiratory wheezing  ABDOMEN: Obese, soft, nontender, nondistended, bowel sounds present, no ascites.  EXTREMITIES: No lower extremity edema.    Labs:    Recent Results (from the past 24 hour(s))   Basic metabolic panel    Collection Time: 10/22/18 12:01 PM   Result Value Ref Range    Sodium 139 133 - 144 mmol/L    Potassium 4.4 3.4 - 5.3 mmol/L    Chloride 107 94 - 109 mmol/L    Carbon Dioxide 24 20 - 32 mmol/L    Anion Gap 9 3 - 14 mmol/L    Glucose 85 70 - 99 mg/dL    Urea Nitrogen 10 7 - 30 mg/dL    Creatinine 0.66 0.52 - 1.04 mg/dL    GFR Estimate 88 >60 mL/min/1.7m2    GFR Estimate If Black >90 >60 mL/min/1.7m2    Calcium 9.0 8.5 - 10.1 mg/dL   CBC with platelets    Collection Time: 10/22/18 12:01 PM   Result Value Ref Range    WBC 8.0 4.0 - 11.0 10e9/L    RBC Count 4.31 3.8 - 5.2 10e12/L    Hemoglobin 14.0 11.7 - 15.7 g/dL    Hematocrit 43.5 35.0 - 47.0 %     (H) 78 - 100 fl    MCH 32.5 26.5 - 33.0 pg    MCHC 32.2 31.5 - 36.5 g/dL    RDW 13.9 10.0 - 15.0 %    Platelet Count 212 150 - 450 10e9/L   N terminal pro BNP outpatient    Collection Time: 10/22/18 12:01 PM   Result Value Ref Range    N-Terminal Pro Bnp 96 0 - 125 pg/mL         Echocardiogram 9/17/18 from [facility] (unable to view images):     CONCLUSION:    Echo 2018-09-17 13:51.     Technically difficult exam.     Sinus rhythm during study.     Normal LV size and systolic function. Hypokinesis of mid " into distal     inferolateral wall is suggested.     Normal RV size and function.     Mild LA dilatation.     Moderate mitral regurgitation.     Mild to moderate pulmonic regurgitation.     Mild tricuspid regurgitation.     Mildly dilated ascending aorta.     Compared to image review of study dated 10/4/2017, no signficant     interval changes noted.     Left Ventricle:     Normal LV size and systolic function. Normal                         left ventricular wall thickness. Diastolic filling                         pattern is indeterminate.    Right Ventricle:    Normal RV size and function.    Left Atrium:        Mild LA dilatation.    Right Atrium:       Normal RA size.    Aortic Valve:       Sclerotic aortic valve, no significant stenosis.                         Trivial regurgitation.    Mitral Valve:       Moderately calcified mitral annulus. Non-                        specific thickening of mitral valve leaflets.                         Moderate mitral regurgitation.    Tricuspid Valve:    Mild tricuspid regurgitation. Estimated RV                         systolic pressure= 21 mmHg + right atrial                         pressure.    Pulmonic Valve:     Mild to moderate pulmonic regurgitation.    Aorta:              Mildly dilated ascending aorta (measures 3.8                         cm.).    Pericardium:        No gross pericardial effusion.    IVC:                Normal IVC.    IAS:                Interatrial septum not well visualized.         Assessment and Plan:   Edda Mckeon is 68 year old female with a history of severe asthma, bronchiectasis, LAILA on CPAP, obesity, hypertension, hyperlipidemia, diastolic dysfunction, moderate mitral regurgitation, and hypothyroidism who presents for further evaluation of her fatigue, lightheadedness, and other various somatic symptoms (e.g. chest and back pain).     1. Hypokinesis of mid into distal inferolateral wall  2. Diastolic dysfunction  3. Moderate mitral  regurgitation  4. Hypertension  5. Obesity  6. Depression    Patient appears to be NYHA functional class II. She appears euvolemic. She does not have a JVD or lower extremity edema. NTproBNP 96 today. She presents with various somatic symptoms which may not all be due to a cardiac etiology. Her mitral regurgitation has been mild-moderate since 2010 and is unlikely to be causing her current symptoms. Her echocardiogram at [facility] on 9/17/19 showed new hypokinesis of the mid into distal inferolateral wall. Unfortunately, we are unable to view the echocardiogram images. We have requested for the patient to try to obtain the outside echocardiogram images or repeat the echocardiogram at the [facility]. Since there was a new area of hypokinesis seen in the most recent echocardiogram at [facility], will get a Lexiscan. Will also get a cardiopulmonary stress test to assess what component of her fatigue and dyspnea are secondary to a respiratory and/or cardiovascular etiology. Will also order a bilateral carotid ultrasound per her request.    Differential diagnoses for her fatigue and various other somatic symptoms include cardiomyopathy (possibly ischemic), valvular (unlikely since MR has been stable), diastolic dysfunction, respiratory (asthma/emphysema), depression, deconditioning, and hypothyroidism. Will also check TSH since it appears to have most recently been checked in 10/2016.    RTC in 4-6 weeks.    Patient seen and discussed with Dr. Bonilla.      Milly Almendarez MD, PhD  Cardiology Fellow  [phone]      I have personally seen and examined the patient, and then discussed with Dr. Almendarez, and agree with the findings and plan in this note. I have reviewed today's vital signs, medications, labs, and imaging.     Sincerely,    Chad Bonilla MD   [facility] for Pulmonary Hypertension  Section of Advanced Heart Failure   Cardiovascular Division  [facility]    [phone]        Patient Care Team:  Emi Monk MD as PCP - General (Internal Medicine)  Angelic Leon as Referring Physician (Pulmonary)  Chad Bonilla MD as MD (Cardiology)  Norah Guajardo, RN as Nurse Coordinator (Cardiology)  ANGELIC LEON

## 2018-10-22 NOTE — PATIENT INSTRUCTIONS
You were seen today in the Cardiovascular Clinic at the [facility].       Cardiology Providers you saw during your visit: Dr. Bonilla         Medication Changes:   1. No medication changes       Patient Instructions:  1. We will schedule you for the following testin: CardioPulmonary Stress Test (CPX)  CPX is a maximal (meaning you exercise to exhaustion, not to achieve a heart rate) exercise test where we measure how well your heart/body uses oxygen or energy. It is the gold standard for measuring functional capacity and helps us differentiate limitations due to lungs, heart, or fitness.   A CPX is NOT a typical stress test. You will NOT be asked to hold your Beta Blocker medication.   You will be scheduled for a CardioPulmonary Stress Test at the [facility] ([address], [facility] [address], [phone]).  Follow these instructions:    1. Report to the [facility] waiting room in the [facility].    2. Nothing to eat for 3 hours prior to your test. You may have clear liquids up to the time of your test    3. Please wear loose, two-piece clothing and comfortable, rubber soled shoes for walking     2. NM Lexiscan:     Cristal Nuclear Stress Test     A Cristal Nuclear Stress Test is an imaging test that measures the flow of blood in your heart at rest and then during exercise. The images are compared to determine whether there are any blockages in the arteries, changes in blood flow or oxygen supply from resting to the stressed state, areas of scar tissue, or if there has been a prior heart attack. It also measures how well the heart muscle squeezes and pumps.     Unlike other stress tests, you will NOT hold your beta blocker medication for this test.     You are scheduled for a Cristal Stress Test at the [facility] [place].   Please report to the [facility] Waiting Room in the [facility].     Follow these instructions:     This test can take up to 3 hours.      Nothing to eat or drink for 3 hours before test except for water.   No caffeine, tobacco, or alcohol for 12 hrs before test.   Do not take nitrates on the day of your test. Do not wear your Nitro-Patch    3. Carotid Ultrasound  4. Echocardiogram ( I will try to get the images from [facility] and if I am able to, I will cancel the echo and let you know).  5. We will check your thyroid from your labs today.    Follow up Appointment Information:  1. Come back in 4-6 weeks for follow up appointment to discuss results.      Results:    Results for MIGUEL MORRISSEY (MRN [id number]) as of 10/22/2018 14:10   Ref. Range 10/22/2018 12:01   Sodium Latest Ref Range: 133 - 144 mmol/L 139   Potassium Latest Ref Range: 3.4 - 5.3 mmol/L 4.4   Chloride Latest Ref Range: 94 - 109 mmol/L 107   Carbon Dioxide Latest Ref Range: 20 - 32 mmol/L 24   Urea Nitrogen Latest Ref Range: 7 - 30 mg/dL 10   Creatinine Latest Ref Range: 0.52 - 1.04 mg/dL 0.66   GFR Estimate Latest Ref Range: >60 mL/min/1.7m2 88   GFR Estimate If Black Latest Ref Range: >60 mL/min/1.7m2 >90   Calcium Latest Ref Range: 8.5 - 10.1 mg/dL 9.0   Anion Gap Latest Ref Range: 3 - 14 mmol/L 9   N-Terminal Pro Bnp Latest Ref Range: 0 - 125 pg/mL 96   Glucose Latest Ref Range: 70 - 99 mg/dL 85   WBC Latest Ref Range: 4.0 - 11.0 10e9/L 8.0   Hemoglobin Latest Ref Range: 11.7 - 15.7 g/dL 14.0   Hematocrit Latest Ref Range: 35.0 - 47.0 % 43.5   Platelet Count Latest Ref Range: 150 - 450 10e9/L 212   RBC Count Latest Ref Range: 3.8 - 5.2 10e12/L 4.31   MCV Latest Ref Range: 78 - 100 fl 101 (H)   MCH Latest Ref Range: 26.5 - 33.0 pg 32.5   MCHC Latest Ref Range: 31.5 - 36.5 g/dL 32.2   RDW Latest Ref Range: 10.0 - 15.0 % 13.9         [address] [facility] on [address] [place] [address]        Thank you for allowing us to be a part of your care here at the [facility]      If you have questions or concerns please contact us at:      Norah Guajardo RN  "BSN   Cardiology Care Coordinator  [facility]   Questions and [phone]   First press #1 for the University and then press #3 for \"Medical Advice\" to reach us Cardiology Nurses.        "

## 2018-10-22 NOTE — NURSING NOTE
Diet: Patient instructed regarding a heart failure healthy diet, including discussion of reduced fat and 2000 mg daily sodium restriction, daily weights, medication purpose and compliance, fluid restrictions and resources for patient and family to access for assistance with heart failure management.       Labs: Patient was given results of the laboratory testing obtained today and patient was instructed about when to return for the next laboratory testing. Add on TSH. Will call with results.    Med Reconcile: Reviewed and verified all current medications with the patient. The updated medication list was printed and given to the patient.     Return Appointment: Patient given instructions regarding scheduling next clinic visit. 4-6 weeks with Dr. Bonilla. HERBERT Russo, carotid ultrasound to be scheduled and reviewed at follow up appointment    Patient stated she understood all health information given and agreed to call with further questions or concerns.       Norah Guajardo RN

## 2018-10-22 NOTE — NURSING NOTE
Chief Complaint   Patient presents with     Follow Up For     NEW AHF, 68 year old female presents with MR, valvular heart disease, severe asthma/bronchiectasis referred by [facility] for evaluation with labs prior     Vitals were taken and medications were reconciled.   Mer Santacruz RMA  12:56 PM

## 2018-10-30 ENCOUNTER — TELEPHONE (OUTPATIENT)
Dept: CARDIOLOGY | Facility: CLINIC | Age: 68
End: 2018-10-30

## 2018-10-30 NOTE — TELEPHONE ENCOUNTER
[facility] Call Center    Phone Message    May a detailed message be left on voicemail: yes    Reason for Call: Other: Pt calling to see if she still needs to have the Bolateral imaging done that is scheduled on 11/6, as she recently had an MRI that came back normal.  Edda is also wondering if the sonogram pictures from [facility] has been sent over.  Please call her to discuss.     Action Taken: Message routed to:  [facility] ([facility]

## 2018-10-30 NOTE — TELEPHONE ENCOUNTER
Returned call to Edda. She states her primary care physician did an MRI of her carotids and the results came back normal. She would like to cancel her carotid artery ultrasound which was scheduled for 11/6. This has been canceled for her. She also inquired if we had gotten her echo images from the [facility] yet. I informed her that we have not received them yet but expect them sometime this week. She reports no additional questions and states she will be here for her testing on Thursday.

## 2018-11-01 ENCOUNTER — HOSPITAL ENCOUNTER (OUTPATIENT)
Dept: NUCLEAR MEDICINE | Facility: CLINIC | Age: 68
Setting detail: NUCLEAR MEDICINE
End: 2018-11-01
Attending: INTERNAL MEDICINE
Payer: MEDICARE

## 2018-11-01 ENCOUNTER — HOSPITAL ENCOUNTER (OUTPATIENT)
Dept: CARDIOLOGY | Facility: CLINIC | Age: 68
Discharge: HOME | End: 2018-11-01
Attending: INTERNAL MEDICINE | Admitting: INTERNAL MEDICINE
Payer: MEDICARE

## 2018-11-01 DIAGNOSIS — I50.30 DIASTOLIC HEART FAILURE (H): ICD-10-CM

## 2018-11-01 PROCEDURE — 25000128 H RX IP 250 OP 636: Performed by: INTERNAL MEDICINE

## 2018-11-01 PROCEDURE — 34300033 ZZH RX 343: Performed by: INTERNAL MEDICINE

## 2018-11-01 PROCEDURE — A9502 TC99M TETROFOSMIN: HCPCS | Performed by: INTERNAL MEDICINE

## 2018-11-01 PROCEDURE — 94618 PULMONARY STRESS TESTING: CPT | Mod: 26 | Performed by: INTERNAL MEDICINE

## 2018-11-01 PROCEDURE — 78452 HT MUSCLE IMAGE SPECT MULT: CPT

## 2018-11-01 PROCEDURE — 93017 CV STRESS TEST TRACING ONLY: CPT

## 2018-11-01 PROCEDURE — 93016 CV STRESS TEST SUPVJ ONLY: CPT | Performed by: INTERNAL MEDICINE

## 2018-11-01 RX ORDER — ACYCLOVIR 200 MG/1
0-1 CAPSULE ORAL
Status: DISCONTINUED | OUTPATIENT
Start: 2018-11-01 | End: 2018-11-02 | Stop reason: HOSPADM

## 2018-11-01 RX ORDER — ALBUTEROL SULFATE 90 UG/1
2 AEROSOL, METERED RESPIRATORY (INHALATION) EVERY 5 MIN PRN
Status: DISCONTINUED | OUTPATIENT
Start: 2018-11-01 | End: 2018-11-02 | Stop reason: HOSPADM

## 2018-11-01 RX ORDER — REGADENOSON 0.08 MG/ML
0.4 INJECTION, SOLUTION INTRAVENOUS ONCE
Status: COMPLETED | OUTPATIENT
Start: 2018-11-01 | End: 2018-11-01

## 2018-11-01 RX ORDER — AMINOPHYLLINE 25 MG/ML
50-100 INJECTION, SOLUTION INTRAVENOUS
Status: DISCONTINUED | OUTPATIENT
Start: 2018-11-01 | End: 2018-11-02 | Stop reason: HOSPADM

## 2018-11-01 RX ADMIN — REGADENOSON 0.4 MG: 0.08 INJECTION, SOLUTION INTRAVENOUS at 09:18

## 2018-11-01 RX ADMIN — TETROFOSMIN 41 MCI.: 1.38 INJECTION, POWDER, LYOPHILIZED, FOR SOLUTION INTRAVENOUS at 09:19

## 2018-11-01 RX ADMIN — TETROFOSMIN 10.3 MCI.: 1.38 INJECTION, POWDER, LYOPHILIZED, FOR SOLUTION INTRAVENOUS at 08:11

## 2018-11-01 NOTE — PROGRESS NOTES
Pt here for Lexiscan. Test, medication and side effects reviewed with patient. Lung sounds with expiratory wheezing. Used home albuterol with continued wheezing. Dr. Augustin at bedside to assess pt; ok to proceed. Denied caffeine use. Tolerated Lexiscan dose with complaints of abdominal discomfort that slowly resolved. Monitored post injection and then taken back to nuclear medicine for follow up imaging.

## 2018-11-06 ENCOUNTER — HOSPITAL ENCOUNTER (OUTPATIENT)
Dept: CARDIOLOGY | Facility: CLINIC | Age: 68
Discharge: HOME | End: 2018-11-06
Attending: INTERNAL MEDICINE | Admitting: INTERNAL MEDICINE
Payer: MEDICARE

## 2018-11-06 DIAGNOSIS — I50.32 CHRONIC DIASTOLIC HEART FAILURE (H): ICD-10-CM

## 2018-11-06 PROCEDURE — 93016 CV STRESS TEST SUPVJ ONLY: CPT | Performed by: INTERNAL MEDICINE

## 2018-11-06 PROCEDURE — 94621 CARDIOPULM EXERCISE TESTING: CPT | Performed by: INTERNAL MEDICINE

## 2018-11-06 PROCEDURE — 94618 PULMONARY STRESS TESTING: CPT | Mod: 26 | Performed by: INTERNAL MEDICINE

## 2018-11-21 DIAGNOSIS — I38 VALVULAR HEART DISEASE: Primary | ICD-10-CM

## 2018-11-21 DIAGNOSIS — I50.9 CHF (CONGESTIVE HEART FAILURE) (H): ICD-10-CM

## 2018-11-26 ENCOUNTER — OFFICE VISIT (OUTPATIENT)
Dept: CARDIOLOGY | Facility: CLINIC | Age: 68
End: 2018-11-26
Attending: INTERNAL MEDICINE
Payer: MEDICARE

## 2018-11-26 VITALS
HEIGHT: 60 IN | SYSTOLIC BLOOD PRESSURE: 140 MMHG | DIASTOLIC BLOOD PRESSURE: 79 MMHG | HEART RATE: 67 BPM | WEIGHT: 172 LBS | OXYGEN SATURATION: 96 % | BODY MASS INDEX: 33.77 KG/M2

## 2018-11-26 DIAGNOSIS — I38 VALVULAR HEART DISEASE: ICD-10-CM

## 2018-11-26 DIAGNOSIS — I34.0 MITRAL VALVE INSUFFICIENCY, UNSPECIFIED ETIOLOGY: ICD-10-CM

## 2018-11-26 DIAGNOSIS — R42 DIZZINESS: Primary | ICD-10-CM

## 2018-11-26 DIAGNOSIS — I50.32 CHRONIC DIASTOLIC HEART FAILURE (H): ICD-10-CM

## 2018-11-26 DIAGNOSIS — I50.9 CHF (CONGESTIVE HEART FAILURE) (H): ICD-10-CM

## 2018-11-26 LAB
ANION GAP SERPL CALCULATED.3IONS-SCNC: 7 MMOL/L (ref 3–14)
BUN SERPL-MCNC: 19 MG/DL (ref 7–30)
CALCIUM SERPL-MCNC: 9 MG/DL (ref 8.5–10.1)
CHLORIDE SERPL-SCNC: 106 MMOL/L (ref 94–109)
CO2 SERPL-SCNC: 27 MMOL/L (ref 20–32)
CREAT SERPL-MCNC: 0.74 MG/DL (ref 0.52–1.04)
ERYTHROCYTE [DISTWIDTH] IN BLOOD BY AUTOMATED COUNT: 14.5 % (ref 10–15)
GFR SERPL CREATININE-BSD FRML MDRD: 78 ML/MIN/1.7M2
GLUCOSE SERPL-MCNC: 88 MG/DL (ref 70–99)
HCT VFR BLD AUTO: 40.9 % (ref 35–47)
HGB BLD-MCNC: 13.2 G/DL (ref 11.7–15.7)
MCH RBC QN AUTO: 32 PG (ref 26.5–33)
MCHC RBC AUTO-ENTMCNC: 32.3 G/DL (ref 31.5–36.5)
MCV RBC AUTO: 99 FL (ref 78–100)
NT-PROBNP SERPL-MCNC: 138 PG/ML (ref 0–125)
PLATELET # BLD AUTO: 253 10E9/L (ref 150–450)
POTASSIUM SERPL-SCNC: 3.7 MMOL/L (ref 3.4–5.3)
RBC # BLD AUTO: 4.13 10E12/L (ref 3.8–5.2)
SODIUM SERPL-SCNC: 140 MMOL/L (ref 133–144)
TSH SERPL DL<=0.005 MIU/L-ACNC: 1.32 MU/L (ref 0.4–4)
WBC # BLD AUTO: 7.4 10E9/L (ref 4–11)

## 2018-11-26 PROCEDURE — 80048 BASIC METABOLIC PNL TOTAL CA: CPT | Performed by: INTERNAL MEDICINE

## 2018-11-26 PROCEDURE — 85027 COMPLETE CBC AUTOMATED: CPT | Performed by: INTERNAL MEDICINE

## 2018-11-26 PROCEDURE — 99205 OFFICE O/P NEW HI 60 MIN: CPT | Mod: GC | Performed by: INTERNAL MEDICINE

## 2018-11-26 PROCEDURE — 83880 ASSAY OF NATRIURETIC PEPTIDE: CPT | Performed by: INTERNAL MEDICINE

## 2018-11-26 PROCEDURE — 36415 COLL VENOUS BLD VENIPUNCTURE: CPT | Performed by: INTERNAL MEDICINE

## 2018-11-26 PROCEDURE — G0463 HOSPITAL OUTPT CLINIC VISIT: HCPCS | Mod: ZF

## 2018-11-26 PROCEDURE — 0298T ZZC EXT ECG > 48HR TO 21 DAY REVIEW AND INTERPRETATN: CPT | Performed by: INTERNAL MEDICINE

## 2018-11-26 PROCEDURE — 84443 ASSAY THYROID STIM HORMONE: CPT | Performed by: INTERNAL MEDICINE

## 2018-11-26 PROCEDURE — 0296T ZIO PATCH HOLTER: CPT | Mod: ZF | Performed by: INTERNAL MEDICINE

## 2018-11-26 RX ORDER — DOXYCYCLINE 100 MG/1
TABLET ORAL
Refills: 0 | COMMUNITY
Start: 2018-11-02 | End: 2022-09-18

## 2018-11-26 ASSESSMENT — PAIN SCALES - GENERAL: PAINLEVEL: MILD PAIN (3)

## 2018-11-26 NOTE — LETTER
11/26/2018      RE: Edda Mckeon  [address]  [facility] [address]       November 26, 2018      Angelic Leonard MD   [facility]   [facility]    [address] [place] [address]    [phone]          Dear Dr. Leonard,     Thank you for referring Ms. Edda Mckeon to the Heart Failure Clinic at the [facility]. As you know, Edda Mckeon is a 68 year old female with the following past medical history who presents for follow-up:     1. Severe asthma  2. Bronchiectasis  3. Obstructive sleep apnea, compliant with CPAP  4. Obesity  5. Hypertension  6. Hyperlipidemia  7. Diastolic dysfunction  8. Moderate mitral regurgitation  9. Hypothyroidism    She established care with us on 10/22/18 for further evaluation of her dyspnea, fatigue, and lightheadedness. Her main concern is her lightheadedness. She notes having continued intermittent lightheadedness, no vertigo, that has not improved or worsened. She notes having intermittent lightheadedness that lasts for about 1 minute that occurs randomly twice a day. There are no known triggers of her lightheadedness; episodes do not occur consistently at rest or with activity and does not occur with positional changes. No syncope. Her chronic dyspnea has not worsened. She continues to have fatigue. Notes exercising 10-15 mins/day by dancing and occasionally lifts weights. Has frequent chest discomfort that occurs at night. Complains of intermittent abdominal pain that occurs 3-4x/week around 1-2 AM that occurs long after her last meal at 5 PM. No nausea, emesis, diarrhea, constipation, hematochezia, melena, dysuria, or hematuria. ROS otherwise notable for gradually worsening hearing and vision and a sore throat a couple weeks ago.     [facility]/lightheadedness. She got MRI/MRA head and neck to evaluate for vertebrobasilar  insufficiency which did not show any significant stenosis in the neck vessels, dissection or pseudoaneurysm.      Past Surgical History  Caesarian Section     Family History  No family history of myocardial infarction.  Father - palpitations, CVA  Mother - CVA, had atrial fibrillation ablation  Daughter - Moyamoya    Social History   Substance Use Topics     Smoking status: Never Smoker     Smokeless tobacco: Never Used     Alcohol use Yes      Comment: occs         Current Outpatient Prescriptions   Medication Sig     albuterol (PROAIR HFA/PROVENTIL HFA/VENTOLIN HFA) 108 (90 Base) MCG/ACT inhaler Inhale 1-2 puffs into the lungs     ascorbic acid 500 MG TABS Take 500 mg by mouth     aspirin 81 MG tablet      Cholecalciferol (VITAMIN D) 2000 units tablet Take 2,000 Units by mouth     ciprofloxacin (CIPRO) 500 MG tablet TK 1 T PO  BID FOR 5 DAYS     clotrimazole (MYCELEX) 10 MG LOZG lozenge SLOWLY DISSOLVE 1 T PO FID FOR 14 DAYS.     fish oil-omega-3 fatty acids 1000 MG capsule Take 2 g by mouth     fluticasone (FLONASE) 50 MCG/ACT spray 2 sprays     furosemide (LASIX) 20 MG tablet TK 1 T PO QAM FOR 3 DAYS.     ipratropium - albuterol 0.5 mg/2.5 mg/3 mL (DUONEB) 0.5-2.5 (3) MG/3ML neb solution Inhale 3 mLs into the lungs     levothyroxine (SYNTHROID/LEVOTHROID) 75 MCG tablet TAKE 1 TABLET BY MOUTH DAILY     mometasone furoate (ASMANEX HFA) 200 MCG/ACT inhaler Inhale 2 puffs into the lungs     Multiple vitamin TABS      nystatin (MYCOSTATIN) 995244 UNIT/ML suspension SAS 5 ML PO BID FOR 7 DAYS     ondansetron (ZOFRAN-ODT) 4 MG ODT tab DISSOLVE ONE T ON TONGUE AND SWALLOW Q 8 H PRF NAUSEA OR VOMITING     order for DME Oxygen for home use. 2 liters per NC during daytime, 4 at night. Frequency: Continuous with portability.; Length of need: 99  Months.     oxyCODONE-acetaminophen (PERCOCET) 5-325 MG per tablet TK ONE TO TWO TS PO Q 6 H PRF PAIN. NM4     polyvinyl alcohol (LIQUIFILM TEARS) 1.4 % ophthalmic solution       predniSONE (DELTASONE) 20 MG tablet      rosuvastatin (CRESTOR) 40 MG tablet Take 40 mg by mouth     sodium chloride 3 % NEBU neb solution INHALE 1 VIAL BY MOUTH VIA NEBULIZER BID     tiotropium (SPIRIVA RESPIMAT) 2.5 MCG/ACT inhalation aerosol Inhale 2 puffs into the lungs     tretinoin (RETIN-A) 0.025 % cream Apply to affected areas on cheek in the evenings daily     Turmeric 450 MG CAPS      valACYclovir (VALTREX) 1000 mg tablet Take 1,000 mg by mouth     vitamin E 400 units TABS 1 Cap daily.     doxycycline monohydrate (ADOXA) 100 MG tablet TK 1 T PO BID FOR 10 DAYS     No current facility-administered medications for this visit.          ROS:   10 point ROS negative other than what is mentioned above.    EXAM:   /79 (BP Location: Right arm, Patient Position: Chair, Cuff Size: Adult Large)  Pulse 67  Ht 1.524 m (5')  Wt 78 kg (172 lb)  SpO2 96%  BMI 33.59 kg/m2   GENERAL: Alert, oriented, NAD  HEENT:  NC/AT. Sclerae white.  MMM, no oral lesions  NECK: No JVD   HEART: RRR, normal S1 and S2, 2/6 ALAN heard at apex  LUNGS:  Clear to auscultation bilaterally, no wheezes, rales, or rhonchi  ABDOMEN: Soft, nontender, nondistended, bowel sounds present, no hepatojugular reflux, no ascites.  EXTREMITIES: No lower extremity edema.    Labs:  Recent Results (from the past 72 hour(s))   TSH    Collection Time: 11/26/18  3:50 PM   Result Value Ref Range    TSH 1.32 0.40 - 4.00 mU/L   Basic metabolic panel    Collection Time: 11/26/18  3:50 PM   Result Value Ref Range    Sodium 140 133 - 144 mmol/L    Potassium 3.7 3.4 - 5.3 mmol/L    Chloride 106 94 - 109 mmol/L    Carbon Dioxide 27 20 - 32 mmol/L    Anion Gap 7 3 - 14 mmol/L    Glucose 88 70 - 99 mg/dL    Urea Nitrogen 19 7 - 30 mg/dL    Creatinine 0.74 0.52 - 1.04 mg/dL    GFR Estimate 78 >60 mL/min/1.7m2    GFR Estimate If Black >90 >60 mL/min/1.7m2    Calcium 9.0 8.5 - 10.1 mg/dL   N terminal pro BNP outpatient    Collection Time: 11/26/18  3:50 PM   Result  Value Ref Range    N-Terminal Pro Bnp 138 (H) 0 - 125 pg/mL   CBC with platelets    Collection Time: 11/26/18  3:50 PM   Result Value Ref Range    WBC 7.4 4.0 - 11.0 10e9/L    RBC Count 4.13 3.8 - 5.2 10e12/L    Hemoglobin 13.2 11.7 - 15.7 g/dL    Hematocrit 40.9 35.0 - 47.0 %    MCV 99 78 - 100 fl    MCH 32.0 26.5 - 33.0 pg    MCHC 32.3 31.5 - 36.5 g/dL    RDW 14.5 10.0 - 15.0 %    Platelet Count 253 150 - 450 10e9/L         Cardiopulmonary stress test 11/6/18:  1. Subjective response: The patient did not report any symptoms during testing.  2. ECG interpretation:   Resting supine ECG - normal sinus rhythm (HR 60), within normal limits  The stress ECG was negative for ischemia at a diagnostic level of myocardial O2 demand. Substantial EKG artifact.  3. RER: 1.14, VE/VCO2 slope: 26.55    Assessment: The patient achieved a moderately impaired, class II functional capacity with cardiac and ventilatory limitation to exercise. A cardiac limitation is suggested given the low peak VO2 and oxygen pulse. A ventilatory limitation is suggested given the abnormal baseline spirometry and the low breathing reserve. The VE/VCO2 slope was 26.55. The patient did not report any symptoms during testing. The test was terminated at the patient's request secondary to general fatigue.      Lexiscan 11/1/18:  Impression:  1. Normal myocardial SPECT study with a summed stress score of 0.   2. No transmural infarction.   3. No evidence of ischemia.        Echocardiogram 9/17/18 from [facility] (unable to view images):   CONCLUSION:    Echo 2018-09-17 13:51.     Technically difficult exam.     Sinus rhythm during study.     Normal LV size and systolic function. Hypokinesis of mid into distal     inferolateral wall is suggested.     Normal RV size and function.     Mild LA dilatation.     Moderate mitral regurgitation.     Mild to moderate pulmonic regurgitation.     Mild tricuspid regurgitation.     Mildly dilated ascending aorta.      Compared to image review of study dated 10/4/2017, no signficant     interval changes noted.     Left Ventricle:     Normal LV size and systolic function. Normal                         left ventricular wall thickness. Diastolic filling                         pattern is indeterminate.    Right Ventricle:    Normal RV size and function.    Left Atrium:        Mild LA dilatation.    Right Atrium:       Normal RA size.    Aortic Valve:       Sclerotic aortic valve, no significant stenosis.                         Trivial regurgitation.    Mitral Valve:       Moderately calcified mitral annulus. Non-                        specific thickening of mitral valve leaflets.                         Moderate mitral regurgitation.    Tricuspid Valve:    Mild tricuspid regurgitation. Estimated RV                         systolic pressure= 21 mmHg + right atrial                         pressure.    Pulmonic Valve:     Mild to moderate pulmonic regurgitation.    Aorta:              Mildly dilated ascending aorta (measures 3.8                         cm.).    Pericardium:        No gross pericardial effusion.    IVC:                Normal IVC.    IAS:                Interatrial septum not well visualized.         Assessment and Plan:   Edda Mckeon is a 68 year old female with a history of severe asthma, bronchiectasis, LAILA on CPAP, obesity, hypertension, hyperlipidemia, diastolic dysfunction, moderate mitral regurgitation, and hypothyroidism who presents for further cardiac evaluation of her fatigue, lightheadedness, and other various somatic symptoms (e.g. chest pain).     Her presyncope, fatigue, and other symptoms are not likely to be due to a cardiac etiology. She has had an extensive evaluation with echocardiogram, CPX, and Lexiscan. Lexiscan was negative for ischemia and transmural infarct. Her dyspnea is primarily due to respiratory etiology. Her CPX test showed both cardiac and pulmonary components to her limitation, but  her dyspnea/limitation is primarily due to a pulmonary component. She has had mild to moderate MR that has been present on her echocardiograms since 2010 and is unlikely to be the cause of her dyspnea and lightheadedness. She has thickening of the posterior mitral leaflet but surgery is not indicated at this time. To complete the cardiac evaluation, will get a 7 day Ziopatch to assess for any arrhythmias. If the Ziopatch is negative for arrhythmias, patient will not need frequent Cardiology follow-up and can follow-up with us annually with annual transthoracic echocardiograms.    Patient should follow-up with her primary care physician to further evaluate the etiology of her presyncope and other somatic components. Her presyncope could be due to her vision/hearing impairments.    If the Ziopatch is negative for arrhythmias, she should return to clinic in 1 year with annual transthoracic echocardiograms. If Ziopatch shows arrhythmias, will contact patient and have her follow-up earlier.      Patient seen and discussed with Dr. Bonilla.      Milly Almendarez MD, PhD  Cardiology Fellow  [phone]    CC  Patient Care Team:  Emi Monk MD as PCP - General (Internal Medicine)  Angelic Leon as Referring Physician (Pulmonary)  Chad Bonilla MD as MD (Cardiology)  Norah Guajardo RN as Nurse Coordinator (Cardiology)  ANGELIC LEON MD

## 2018-11-26 NOTE — PROGRESS NOTES
November 26, 2018      Angelic Leonard MD   [facility]   [facility]    [address] [place] [address]    [phone]          Dear Dr. Leonard,     Thank you for referring Ms. Edda Mckeon to the Heart Failure Clinic at the [facility]. As you know, Edda Mckeon is a 68 year old female with the following past medical history who presents for follow-up:     1. Severe asthma  2. Bronchiectasis  3. Obstructive sleep apnea, compliant with CPAP  4. Obesity  5. Hypertension  6. Hyperlipidemia  7. Diastolic dysfunction  8. Moderate mitral regurgitation  9. Hypothyroidism    She established care with us on 10/22/18 for further evaluation of her dyspnea, fatigue, and lightheadedness. Her main concern is her lightheadedness. She notes having continued intermittent lightheadedness, no vertigo, that has not improved or worsened. She notes having intermittent lightheadedness that lasts for about 1 minute that occurs randomly twice a day. There are no known triggers of her lightheadedness; episodes do not occur consistently at rest or with activity and does not occur with positional changes. No syncope. Her chronic dyspnea has not worsened. She continues to have fatigue. Notes exercising 10-15 mins/day by dancing and occasionally lifts weights. Has frequent chest discomfort that occurs at night. Complains of intermittent abdominal pain that occurs 3-4x/week around 1-2 AM that occurs long after her last meal at 5 PM. No nausea, emesis, diarrhea, constipation, hematochezia, melena, dysuria, or hematuria. ROS otherwise notable for gradually worsening hearing and vision and a sore throat a couple weeks ago.     She was seen in primary care clinic on 10/23/18 at [facility] for further evaluation of presyncope/lightheadedness. She got MRI/MRA head and neck to evaluate for vertebrobasilar insufficiency which did not show any significant stenosis in the neck vessels, dissection  or pseudoaneurysm.      Past Surgical History  Caesarian Section     Family History  No family history of myocardial infarction.  Father - palpitations, CVA  Mother - CVA, had atrial fibrillation ablation  Daughter - Moyamoya    Social History   Substance Use Topics     Smoking status: Never Smoker     Smokeless tobacco: Never Used     Alcohol use Yes      Comment: occs         Current Outpatient Prescriptions   Medication Sig     albuterol (PROAIR HFA/PROVENTIL HFA/VENTOLIN HFA) 108 (90 Base) MCG/ACT inhaler Inhale 1-2 puffs into the lungs     ascorbic acid 500 MG TABS Take 500 mg by mouth     aspirin 81 MG tablet      Cholecalciferol (VITAMIN D) 2000 units tablet Take 2,000 Units by mouth     ciprofloxacin (CIPRO) 500 MG tablet TK 1 T PO  BID FOR 5 DAYS     clotrimazole (MYCELEX) 10 MG LOZG lozenge SLOWLY DISSOLVE 1 T PO FID FOR 14 DAYS.     fish oil-omega-3 fatty acids 1000 MG capsule Take 2 g by mouth     fluticasone (FLONASE) 50 MCG/ACT spray 2 sprays     furosemide (LASIX) 20 MG tablet TK 1 T PO QAM FOR 3 DAYS.     ipratropium - albuterol 0.5 mg/2.5 mg/3 mL (DUONEB) 0.5-2.5 (3) MG/3ML neb solution Inhale 3 mLs into the lungs     levothyroxine (SYNTHROID/LEVOTHROID) 75 MCG tablet TAKE 1 TABLET BY MOUTH DAILY     mometasone furoate (ASMANEX HFA) 200 MCG/ACT inhaler Inhale 2 puffs into the lungs     Multiple vitamin TABS      nystatin (MYCOSTATIN) 443997 UNIT/ML suspension SAS 5 ML PO BID FOR 7 DAYS     ondansetron (ZOFRAN-ODT) 4 MG ODT tab DISSOLVE ONE T ON TONGUE AND SWALLOW Q 8 H PRF NAUSEA OR VOMITING     order for DME Oxygen for home use. 2 liters per NC during daytime, 4 at night. Frequency: Continuous with portability.; Length of need: 99  Months.     oxyCODONE-acetaminophen (PERCOCET) 5-325 MG per tablet TK ONE TO TWO TS PO Q 6 H PRF PAIN. NM4     polyvinyl alcohol (LIQUIFILM TEARS) 1.4 % ophthalmic solution      predniSONE (DELTASONE) 20 MG tablet      rosuvastatin (CRESTOR) 40 MG tablet Take 40 mg by  mouth     sodium chloride 3 % NEBU neb solution INHALE 1 VIAL BY MOUTH VIA NEBULIZER BID     tiotropium (SPIRIVA RESPIMAT) 2.5 MCG/ACT inhalation aerosol Inhale 2 puffs into the lungs     tretinoin (RETIN-A) 0.025 % cream Apply to affected areas on cheek in the evenings daily     Turmeric 450 MG CAPS      valACYclovir (VALTREX) 1000 mg tablet Take 1,000 mg by mouth     vitamin E 400 units TABS 1 Cap daily.     doxycycline monohydrate (ADOXA) 100 MG tablet TK 1 T PO BID FOR 10 DAYS     No current facility-administered medications for this visit.          ROS:   10 point ROS negative other than what is mentioned above.    EXAM:   /79 (BP Location: Right arm, Patient Position: Chair, Cuff Size: Adult Large)  Pulse 67  Ht 1.524 m (5')  Wt 78 kg (172 lb)  SpO2 96%  BMI 33.59 kg/m2   GENERAL: Alert, oriented, NAD  HEENT:  NC/AT. Sclerae white.  MMM, no oral lesions  NECK: No JVD   HEART: RRR, normal S1 and S2, 2/6 ALAN heard at apex  LUNGS:  Clear to auscultation bilaterally, no wheezes, rales, or rhonchi  ABDOMEN: Soft, nontender, nondistended, bowel sounds present, no hepatojugular reflux, no ascites.  EXTREMITIES: No lower extremity edema.    Labs:  Recent Results (from the past 72 hour(s))   TSH    Collection Time: 11/26/18  3:50 PM   Result Value Ref Range    TSH 1.32 0.40 - 4.00 mU/L   Basic metabolic panel    Collection Time: 11/26/18  3:50 PM   Result Value Ref Range    Sodium 140 133 - 144 mmol/L    Potassium 3.7 3.4 - 5.3 mmol/L    Chloride 106 94 - 109 mmol/L    Carbon Dioxide 27 20 - 32 mmol/L    Anion Gap 7 3 - 14 mmol/L    Glucose 88 70 - 99 mg/dL    Urea Nitrogen 19 7 - 30 mg/dL    Creatinine 0.74 0.52 - 1.04 mg/dL    GFR Estimate 78 >60 mL/min/1.7m2    GFR Estimate If Black >90 >60 mL/min/1.7m2    Calcium 9.0 8.5 - 10.1 mg/dL   N terminal pro BNP outpatient    Collection Time: 11/26/18  3:50 PM   Result Value Ref Range    N-Terminal Pro Bnp 138 (H) 0 - 125 pg/mL   CBC with platelets    Collection  Time: 11/26/18  3:50 PM   Result Value Ref Range    WBC 7.4 4.0 - 11.0 10e9/L    RBC Count 4.13 3.8 - 5.2 10e12/L    Hemoglobin 13.2 11.7 - 15.7 g/dL    Hematocrit 40.9 35.0 - 47.0 %    MCV 99 78 - 100 fl    MCH 32.0 26.5 - 33.0 pg    MCHC 32.3 31.5 - 36.5 g/dL    RDW 14.5 10.0 - 15.0 %    Platelet Count 253 150 - 450 10e9/L         Cardiopulmonary stress test 11/6/18:  1. Subjective response: The patient did not report any symptoms during testing.  2. ECG interpretation:   Resting supine ECG - normal sinus rhythm (HR 60), within normal limits  The stress ECG was negative for ischemia at a diagnostic level of myocardial O2 demand. Substantial EKG artifact.  3. RER: 1.14, VE/VCO2 slope: 26.55    Assessment: The patient achieved a moderately impaired, class II functional capacity with cardiac and ventilatory limitation to exercise. A cardiac limitation is suggested given the low peak VO2 and oxygen pulse. A ventilatory limitation is suggested given the abnormal baseline spirometry and the low breathing reserve. The VE/VCO2 slope was 26.55. The patient did not report any symptoms during testing. The test was terminated at the patient's request secondary to general fatigue.      Lexiscan 11/1/18:  Impression:  1. Normal myocardial SPECT study with a summed stress score of 0.   2. No transmural infarction.   3. No evidence of ischemia.        Echocardiogram 9/17/18 from [facility] (unable to view images):   CONCLUSION:    Echo 2018-09-17 13:51.     Technically difficult exam.     Sinus rhythm during study.     Normal LV size and systolic function. Hypokinesis of mid into distal     inferolateral wall is suggested.     Normal RV size and function.     Mild LA dilatation.     Moderate mitral regurgitation.     Mild to moderate pulmonic regurgitation.     Mild tricuspid regurgitation.     Mildly dilated ascending aorta.     Compared to image review of study dated 10/4/2017, no signficant     interval changes noted.      Left Ventricle:     Normal LV size and systolic function. Normal                         left ventricular wall thickness. Diastolic filling                         pattern is indeterminate.    Right Ventricle:    Normal RV size and function.    Left Atrium:        Mild LA dilatation.    Right Atrium:       Normal RA size.    Aortic Valve:       Sclerotic aortic valve, no significant stenosis.                         Trivial regurgitation.    Mitral Valve:       Moderately calcified mitral annulus. Non-                        specific thickening of mitral valve leaflets.                         Moderate mitral regurgitation.    Tricuspid Valve:    Mild tricuspid regurgitation. Estimated RV                         systolic pressure= 21 mmHg + right atrial                         pressure.    Pulmonic Valve:     Mild to moderate pulmonic regurgitation.    Aorta:              Mildly dilated ascending aorta (measures 3.8                         cm.).    Pericardium:        No gross pericardial effusion.    IVC:                Normal IVC.    IAS:                Interatrial septum not well visualized.         Assessment and Plan:   Edda Mckeon is a 68 year old female with a history of severe asthma, bronchiectasis, LAILA on CPAP, obesity, hypertension, hyperlipidemia, diastolic dysfunction, moderate mitral regurgitation, and hypothyroidism who presents for further cardiac evaluation of her fatigue, lightheadedness, and other various somatic symptoms (e.g. chest pain).     Her presyncope, fatigue, and other symptoms are not likely to be due to a cardiac etiology. She has had an extensive evaluation with echocardiogram, CPX, and Lexiscan. Lexiscan was negative for ischemia and transmural infarct. Her dyspnea is primarily due to respiratory etiology. Her CPX test showed both cardiac and pulmonary components to her limitation, but her dyspnea/limitation is primarily due to a pulmonary component. She has had mild to moderate  MR that has been present on her echocardiograms since 2010 and is unlikely to be the cause of her dyspnea and lightheadedness. She has thickening of the posterior mitral leaflet but surgery is not indicated at this time. To complete the cardiac evaluation, will get a 7 day Ziopatch to assess for any arrhythmias. If the Ziopatch is negative for arrhythmias, patient will not need frequent Cardiology follow-up and can follow-up with us annually with annual transthoracic echocardiograms.    Patient should follow-up with her primary care physician to further evaluate the etiology of her presyncope and other somatic components. Her presyncope could be due to her vision/hearing impairments.    If the Ziopatch is negative for arrhythmias, she should return to clinic in 1 year with annual transthoracic echocardiograms. If Ziopatch shows arrhythmias, will contact patient and have her follow-up earlier.      Patient seen and discussed with Dr. Bonilla.      Milly Almendarez MD, PhD  Cardiology Fellow  [phone]    I have personally seen and examined the patient, and then discussed with Dr. Almendarez, and agree with the findings and plan in this note. I have reviewed today's vital signs, medications, labs, and imaging.     Dyspnea on exertion likely secondary to restrictive lung disease and mild diastolic dysfunction.   Mild-moderate MR with preserved EF. Not severe enough for surgical intervention.   Recommended treatment of her underlying lung disease with Dr. Hurtado along with fluid and salt restriction, exercise training, and diuretics as needed.     Few short runs of SVT's but not concerning. Recommended to decrease caffeine intake. Will defer beta-blockers as her SVT burden is very minimal and given her coexisting lung disease.   RTC in a year with echo or call sooner with worsening symptoms.     Sincerely,    Chad Bonilla MD   [facility]  Section of Advanced Heart Failure   Cardiovascular  Division  [facility]   [phone]      CC  Patient Care Team:  Emi Monk MD as PCP - General (Internal Medicine)  Angelic Leon as Referring Physician (Pulmonary)  Chad Bonilla MD as MD (Cardiology)  Norah Guajardo, ALICIA as Nurse Coordinator (Cardiology)  ANGELIC LEON

## 2018-11-26 NOTE — LETTER
11/26/2018      RE: Edda Mckeon  [address]  [facility] [address]       Dear Colleague,    Thank you for the opportunity to participate in the care of your patient, Edda Mckeon, at the [facility] HEART [facility] at [facility]. Please see a copy of my visit note below.    November 26, 2018      Angelic Leonard MD   [facility]   Ast[facility] Center    [address] [place] [address]    [phone]          Dear Dr. Leonard,     Thank you for referring Ms. Edda Mckeon to the Heart Failure Clinic at the [facility]. As you know, Edda Mckeon is a 68 year old female with the following past medical history who presents for follow-up:     1. Severe asthma  2. Bronchiectasis  3. Obstructive sleep apnea, compliant with CPAP  4. Obesity  5. Hypertension  6. Hyperlipidemia  7. Diastolic dysfunction  8. Moderate mitral regurgitation  9. Hypothyroidism    She established care with us on 10/22/18 for further evaluation of her dyspnea, fatigue, and lightheadedness. Her main concern is her lightheadedness. She notes having continued intermittent lightheadedness, no vertigo, that has not improved or worsened. She notes having intermittent lightheadedness that lasts for about 1 minute that occurs randomly twice a day. There are no known triggers of her lightheadedness; episodes do not occur consistently at rest or with activity and does not occur with positional changes. No syncope. Her chronic dyspnea has not worsened. She continues to have fatigue. Notes exercising 10-15 mins/day by dancing and occasionally lifts weights. Has frequent chest discomfort that occurs at night. Complains of intermittent abdominal pain that occurs 3-4x/week around 1-2 AM that occurs long after her last meal at 5 PM. No nausea, emesis, diarrhea, constipation, hematochezia, melena, dysuria, or hematuria. ROS otherwise notable for gradually worsening hearing and  vision and a sore throat a couple weeks ago.     She was seen in primary care clinic on 10/23/18 at [facility] for further evaluation of presyncope/lightheadedness. She got MRI/MRA head and neck to evaluate for vertebrobasilar insufficiency which did not show any significant stenosis in the neck vessels, dissection or pseudoaneurysm.      Past Surgical History  Caesarian Section     Family History  No family history of myocardial infarction.  Father - palpitations, CVA  Mother - CVA, had atrial fibrillation ablation  Daughter - Moyamoya    Social History   Substance Use Topics     Smoking status: Never Smoker     Smokeless tobacco: Never Used     Alcohol use Yes      Comment: occs         Current Outpatient Prescriptions   Medication Sig     albuterol (PROAIR HFA/PROVENTIL HFA/VENTOLIN HFA) 108 (90 Base) MCG/ACT inhaler Inhale 1-2 puffs into the lungs     ascorbic acid 500 MG TABS Take 500 mg by mouth     aspirin 81 MG tablet      Cholecalciferol (VITAMIN D) 2000 units tablet Take 2,000 Units by mouth     ciprofloxacin (CIPRO) 500 MG tablet TK 1 T PO  BID FOR 5 DAYS     clotrimazole (MYCELEX) 10 MG LOZG lozenge SLOWLY DISSOLVE 1 T PO FID FOR 14 DAYS.     fish oil-omega-3 fatty acids 1000 MG capsule Take 2 g by mouth     fluticasone (FLONASE) 50 MCG/ACT spray 2 sprays     furosemide (LASIX) 20 MG tablet TK 1 T PO QAM FOR 3 DAYS.     ipratropium - albuterol 0.5 mg/2.5 mg/3 mL (DUONEB) 0.5-2.5 (3) MG/3ML neb solution Inhale 3 mLs into the lungs     levothyroxine (SYNTHROID/LEVOTHROID) 75 MCG tablet TAKE 1 TABLET BY MOUTH DAILY     mometasone furoate (ASMANEX HFA) 200 MCG/ACT inhaler Inhale 2 puffs into the lungs     Multiple vitamin TABS      nystatin (MYCOSTATIN) 946659 UNIT/ML suspension SAS 5 ML PO BID FOR 7 DAYS     ondansetron (ZOFRAN-ODT) 4 MG ODT tab DISSOLVE ONE T ON TONGUE AND SWALLOW Q 8 H PRF NAUSEA OR VOMITING     order for DME Oxygen for home use. 2 liters per NC during daytime, 4 at night. Frequency:  Continuous with portability.; Length of need: 99  Months.     oxyCODONE-acetaminophen (PERCOCET) 5-325 MG per tablet TK ONE TO TWO TS PO Q 6 H PRF PAIN. NM4     polyvinyl alcohol (LIQUIFILM TEARS) 1.4 % ophthalmic solution      predniSONE (DELTASONE) 20 MG tablet      rosuvastatin (CRESTOR) 40 MG tablet Take 40 mg by mouth     sodium chloride 3 % NEBU neb solution INHALE 1 VIAL BY MOUTH VIA NEBULIZER BID     tiotropium (SPIRIVA RESPIMAT) 2.5 MCG/ACT inhalation aerosol Inhale 2 puffs into the lungs     tretinoin (RETIN-A) 0.025 % cream Apply to affected areas on cheek in the evenings daily     Turmeric 450 MG CAPS      valACYclovir (VALTREX) 1000 mg tablet Take 1,000 mg by mouth     vitamin E 400 units TABS 1 Cap daily.     doxycycline monohydrate (ADOXA) 100 MG tablet TK 1 T PO BID FOR 10 DAYS     No current facility-administered medications for this visit.          ROS:   10 point ROS negative other than what is mentioned above.    EXAM:   /79 (BP Location: Right arm, Patient Position: Chair, Cuff Size: Adult Large)  Pulse 67  Ht 1.524 m (5')  Wt 78 kg (172 lb)  SpO2 96%  BMI 33.59 kg/m2   GENERAL: Alert, oriented, NAD  HEENT:  NC/AT. Sclerae white.  MMM, no oral lesions  NECK: No JVD   HEART: RRR, normal S1 and S2, 2/6 ALAN heard at apex  LUNGS:  Clear to auscultation bilaterally, no wheezes, rales, or rhonchi  ABDOMEN: Soft, nontender, nondistended, bowel sounds present, no hepatojugular reflux, no ascites.  EXTREMITIES: No lower extremity edema.    Labs:  Recent Results (from the past 72 hour(s))   TSH    Collection Time: 11/26/18  3:50 PM   Result Value Ref Range    TSH 1.32 0.40 - 4.00 mU/L   Basic metabolic panel    Collection Time: 11/26/18  3:50 PM   Result Value Ref Range    Sodium 140 133 - 144 mmol/L    Potassium 3.7 3.4 - 5.3 mmol/L    Chloride 106 94 - 109 mmol/L    Carbon Dioxide 27 20 - 32 mmol/L    Anion Gap 7 3 - 14 mmol/L    Glucose 88 70 - 99 mg/dL    Urea Nitrogen 19 7 - 30 mg/dL     Creatinine 0.74 0.52 - 1.04 mg/dL    GFR Estimate 78 >60 mL/min/1.7m2    GFR Estimate If Black >90 >60 mL/min/1.7m2    Calcium 9.0 8.5 - 10.1 mg/dL   N terminal pro BNP outpatient    Collection Time: 11/26/18  3:50 PM   Result Value Ref Range    N-Terminal Pro Bnp 138 (H) 0 - 125 pg/mL   CBC with platelets    Collection Time: 11/26/18  3:50 PM   Result Value Ref Range    WBC 7.4 4.0 - 11.0 10e9/L    RBC Count 4.13 3.8 - 5.2 10e12/L    Hemoglobin 13.2 11.7 - 15.7 g/dL    Hematocrit 40.9 35.0 - 47.0 %    MCV 99 78 - 100 fl    MCH 32.0 26.5 - 33.0 pg    MCHC 32.3 31.5 - 36.5 g/dL    RDW 14.5 10.0 - 15.0 %    Platelet Count 253 150 - 450 10e9/L         Cardiopulmonary stress test 11/6/18:  1. Subjective response: The patient did not report any symptoms during testing.  2. ECG interpretation:   Resting supine ECG - normal sinus rhythm (HR 60), within normal limits  The stress ECG was negative for ischemia at a diagnostic level of myocardial O2 demand. Substantial EKG artifact.  3. RER: 1.14, VE/VCO2 slope: 26.55    Assessment: The patient achieved a moderately impaired, class II functional capacity with cardiac and ventilatory limitation to exercise. A cardiac limitation is suggested given the low peak VO2 and oxygen pulse. A ventilatory limitation is suggested given the abnormal baseline spirometry and the low breathing reserve. The VE/VCO2 slope was 26.55. The patient did not report any symptoms during testing. The test was terminated at the patient's request secondary to general fatigue.      Lexiscan 11/1/18:  Impression:  1. Normal myocardial SPECT study with a summed stress score of 0.   2. No transmural infarction.   3. No evidence of ischemia.        Echocardiogram 9/17/18 from [facility] (unable to view images):   CONCLUSION:    Echo 2018-09-17 13:51.     Technically difficult exam.     Sinus rhythm during study.     Normal LV size and systolic function. Hypokinesis of mid into distal     inferolateral wall  is suggested.     Normal RV size and function.     Mild LA dilatation.     Moderate mitral regurgitation.     Mild to moderate pulmonic regurgitation.     Mild tricuspid regurgitation.     Mildly dilated ascending aorta.     Compared to image review of study dated 10/4/2017, no signficant     interval changes noted.     Left Ventricle:     Normal LV size and systolic function. Normal                         left ventricular wall thickness. Diastolic filling                         pattern is indeterminate.    Right Ventricle:    Normal RV size and function.    Left Atrium:        Mild LA dilatation.    Right Atrium:       Normal RA size.    Aortic Valve:       Sclerotic aortic valve, no significant stenosis.                         Trivial regurgitation.    Mitral Valve:       Moderately calcified mitral annulus. Non-                        specific thickening of mitral valve leaflets.                         Moderate mitral regurgitation.    Tricuspid Valve:    Mild tricuspid regurgitation. Estimated RV                         systolic pressure= 21 mmHg + right atrial                         pressure.    Pulmonic Valve:     Mild to moderate pulmonic regurgitation.    Aorta:              Mildly dilated ascending aorta (measures 3.8                         cm.).    Pericardium:        No gross pericardial effusion.    IVC:                Normal IVC.    IAS:                Interatrial septum not well visualized.         Assessment and Plan:   Edda Mckeon is a 68 year old female with a history of severe asthma, bronchiectasis, LAILA on CPAP, obesity, hypertension, hyperlipidemia, diastolic dysfunction, moderate mitral regurgitation, and hypothyroidism who presents for further cardiac evaluation of her fatigue, lightheadedness, and other various somatic symptoms (e.g. chest pain).     Her presyncope, fatigue, and other symptoms are not likely to be due to a cardiac etiology. She has had an extensive evaluation with  echocardiogram, CPX, and Lexiscan. Lexiscan was negative for ischemia and transmural infarct. Her dyspnea is primarily due to respiratory etiology. Her CPX test showed both cardiac and pulmonary components to her limitation, but her dyspnea/limitation is primarily due to a pulmonary component. She has had mild to moderate MR that has been present on her echocardiograms since 2010 and is unlikely to be the cause of her dyspnea and lightheadedness. She has thickening of the posterior mitral leaflet but surgery is not indicated at this time. To complete the cardiac evaluation, will get a 7 day Ziopatch to assess for any arrhythmias. If the Ziopatch is negative for arrhythmias, patient will not need frequent Cardiology follow-up and can follow-up with us annually with annual transthoracic echocardiograms.    Patient should follow-up with her primary care physician to further evaluate the etiology of her presyncope and other somatic components. Her presyncope could be due to her vision/hearing impairments.    If the Ziopatch is negative for arrhythmias, she should return to clinic in 1 year with annual transthoracic echocardiograms. If Ziopatch shows arrhythmias, will contact patient and have her follow-up earlier.      Patient seen and discussed with Dr. Bonilla.      Milly Almendarez MD, PhD  Cardiology Fellow  [phone]      Patient Care Team:  Emi Monk MD as PCP - General (Internal Medicine)  Angelic Leon as Referring Physician (Pulmonary)  Chad Bonilla MD as MD (Cardiology)  Norah Guajardo, RN as Nurse Coordinator (Cardiology)  ANGELIC LEON      Please do not hesitate to contact me if you have any questions/concerns.     Sincerely,     Chad Bonilla MD

## 2018-11-26 NOTE — NURSING NOTE
Chief Complaint   Patient presents with     Follow Up For     68 year old female presents with MR, valvular heart disease, severe asthma/bronchiectasis for follow up after testing with labs prior     Medications reviewed and vitals performed.  Maria Elena Bridges CMA

## 2018-11-26 NOTE — NURSING NOTE
Diet: Patient instructed regarding a heart failure healthy diet, including discussion of reduced fat and 2000 mg daily sodium restriction, daily weights, medication purpose and compliance, fluid restrictions and resources for patient and family to access for assistance with heart failure management.       Labs: Patient was given results of the laboratory testing obtained today and patient was instructed about when to return for the next laboratory testing.     Med Reconcile: Reviewed and verified all current medications with the patient. The updated medication list was printed and given to the patient. No medication changes    Return Appointment: Patient given instructions regarding scheduling next clinic visit. Follow up in 1 year with echo    Patient stated she understood all health information given and agreed to call with further questions or concerns.       Norah Guajardo RN

## 2018-11-26 NOTE — MR AVS SNAPSHOT
After Visit Summary   11/26/2018    Edda Mckeon    MRN: [id number]           Patient Information     Date Of Birth          1950        Visit Information        Provider Department      11/26/2018 4:00 PM Chad Bonilla MD [facility]        Today's Diagnoses     Dizziness    -  1    Mitral valve insufficiency, unspecified etiology          Care Instructions    You were seen today in the Cardiovascular Clinic at the [facility].       Cardiology Providers you saw during your visit: Dr. Bonilla         Medication Changes:   1. No medication changes today.      Patient Instructions:  1. COntact us if your shortness of breath gets worse.    Follow up Appointment Information:  1. Follow up in clinic in 1 year with echocardiogram and labs prior.       Results:    Results for EDDA MCKEON (MRN [id number]) as of 11/26/2018 16:56   Ref. Range 11/26/2018 15:50   Sodium Latest Ref Range: 133 - 144 mmol/L 140   Potassium Latest Ref Range: 3.4 - 5.3 mmol/L 3.7   Chloride Latest Ref Range: 94 - 109 mmol/L 106   Carbon Dioxide Latest Ref Range: 20 - 32 mmol/L 27   Urea Nitrogen Latest Ref Range: 7 - 30 mg/dL 19   Creatinine Latest Ref Range: 0.52 - 1.04 mg/dL 0.74   GFR Estimate Latest Ref Range: >60 mL/min/1.7m2 78   GFR Estimate If Black Latest Ref Range: >60 mL/min/1.7m2 >90   Calcium Latest Ref Range: 8.5 - 10.1 mg/dL 9.0   Anion Gap Latest Ref Range: 3 - 14 mmol/L 7   N-Terminal Pro Bnp Latest Ref Range: 0 - 125 pg/mL 138 (H)   TSH Latest Ref Range: 0.40 - 4.00 mU/L 1.32   Glucose Latest Ref Range: 70 - 99 mg/dL 88   WBC Latest Ref Range: 4.0 - 11.0 10e9/L 7.4   Hemoglobin Latest Ref Range: 11.7 - 15.7 g/dL 13.2   Hematocrit Latest Ref Range: 35.0 - 47.0 % 40.9   Platelet Count Latest Ref Range: 150 - 450 10e9/L 253   RBC Count Latest Ref Range: 3.8 - 5.2 10e12/L 4.13   MCV Latest Ref Range: 78 - 100 fl 99   MCH Latest Ref Range: 26.5 - 33.0 pg 32.0   MCHC Latest Ref  "Range: 31.5 - 36.5 g/dL 32.3   RDW Latest Ref Range: 10.0 - 15.0 % 14.5       [address] [place] [address]        Thank you for allowing us to be a part of your care here at the [facility]      If you have questions or concerns please contact us at:      Norah Guajardo RN BSN   Cardiology Care Coordinator  [facility]   Questions and [phone]   First press #1 for the University and then press #3 for \"Medical Advice\" to reach us Cardiology Nurses.                Follow-ups after your visit        Additional Services     Follow-Up with Advanced Heart Failure Cardiologist       1 year with DR. Bonilla with echo and labs prior                  Future tests that were ordered for you today     Open Future Orders        Priority Expected Expires Ordered    Follow-Up with Advanced Heart Failure Cardiologist Routine 2019    Echocardiogram Complete Routine 2019    Ziopatch Holter Monitor - Adult Routine 2018            Who to contact     If you have questions or need follow up information about today's clinic visit or your schedule please contact [facility] HEART [facility] directly at [phone].  Normal or non-critical lab and imaging results will be communicated to you by MyChart, letter or phone within 4 business days after the clinic has received the results. If you do not hear from us within 7 days, please contact the clinic through MyChart or phone. If you have a critical or abnormal lab result, we will notify you by phone as soon as possible.  Submit refill requests through [employer] or call your pharmacy and they will forward the refill request to us. Please allow 3 business days for your refill to be completed.          Additional Information About Your Visit        Care EveryWhere ID     This is your Care EveryWhere ID. This could be used by other " organizations to access your [facility] medical records  [id number]        Your Vitals Were     Pulse Height Pulse Oximetry BMI (Body Mass Index)          67 1.524 m (5') 96% 33.59 kg/m2         Blood Pressure from Last 3 Encounters:   11/26/18 140/79   10/22/18 137/78    Weight from Last 3 Encounters:   11/26/18 78 kg (172 lb)   10/22/18 76.7 kg (169 lb 3.2 oz)              We Performed the Following     Follow-Up with Advanced Heart Failure Cardiologist        Primary Care Provider Office Phone # Fax #    Denicesarahinancy Monk [phone]       [place] [address]        Equal Access to Services     LILIA MANRIQUE : Hadii siomara thomaso Sominna, waaxda luqadaha, qaybta kaalmada adeegyada, waxsuzanne colvinin remington phelps . So [facility] [phone].    ATENCIÓN: Si habla español, tiene a pettit disposición servicios gratuitos de asistencia lingüística. [facility] [phone].    We comply with applicable federal civil rights laws and [place] laws. We do not discriminate on the basis of race, color, national origin, age, disability, sex, sexual orientation, or gender identity.            Thank you!     Thank you for choosing [facility]  for your care. Our goal is always to provide you with excellent care. Hearing back from our patients is one way we can continue to improve our services. Please take a few minutes to complete the written survey that you may receive in the mail after your visit with us. Thank you!             Your Updated Medication List - Protect others around you: Learn how to safely use, store and throw away your medicines at www.disposemymeds.org.          This list is accurate as of 11/26/18  5:09 PM.  Always use your most recent med list.                   Brand Name Dispense Instructions for use Diagnosis    albuterol 108 (90 Base) MCG/ACT inhaler    PROAIR HFA/PROVENTIL HFA/VENTOLIN HFA     Inhale 1-2 puffs into the lungs         ascorbic acid 500 MG Tabs      Take 500 mg by mouth        aspirin 81 MG tablet    ASA          ciprofloxacin 500 MG tablet    CIPRO     TK 1 T PO  BID FOR 5 DAYS        clotrimazole 10 MG Lozg lozenge    MYCELEX     SLOWLY DISSOLVE 1 T PO FID FOR 14 DAYS.        doxycycline monohydrate 100 MG tablet    ADOXA     TK 1 T PO BID FOR 10 DAYS        fish oil-omega-3 fatty acids 1000 MG capsule      Take 2 g by mouth        fluticasone 50 MCG/ACT nasal spray    FLONASE     2 sprays        furosemide 20 MG tablet    LASIX     TK 1 T PO QAM FOR 3 DAYS.        ipratropium - albuterol 0.5 mg/2.5 mg/3 mL 0.5-2.5 (3) MG/3ML neb solution    DUONEB     Inhale 3 mLs into the lungs        levothyroxine 75 MCG tablet    SYNTHROID/LEVOTHROID     TAKE 1 TABLET BY MOUTH DAILY        mometasone furoate 200 MCG/ACT inhaler    ASMANEX HFA     Inhale 2 puffs into the lungs        Multiple vitamin Tabs           nystatin 490451 UNIT/ML suspension    MYCOSTATIN     SAS 5 ML PO BID FOR 7 DAYS        ondansetron 4 MG ODT tab    ZOFRAN-ODT     DISSOLVE ONE T ON TONGUE AND SWALLOW Q 8 H PRF NAUSEA OR VOMITING        order for DME      Oxygen for home use. 2 liters per NC during daytime, 4 at night. Frequency: Continuous with portability.; Length of need: 99  Months.        oxyCODONE-acetaminophen 5-325 MG tablet    PERCOCET     TK ONE TO TWO TS PO Q 6 H PRF PAIN. NM4        polyvinyl alcohol 1.4 % ophthalmic solution    LIQUIFILM TEARS          predniSONE 20 MG tablet    DELTASONE          rosuvastatin 40 MG tablet    CRESTOR     Take 40 mg by mouth        sodium chloride 3 % Nebu neb solution      INHALE 1 VIAL BY MOUTH VIA NEBULIZER BID        tiotropium 2.5 MCG/ACT inhaler    SPIRIVA RESPIMAT     Inhale 2 puffs into the lungs        tretinoin 0.025 % cream    RETIN-A     Apply to affected areas on cheek in the evenings daily        Turmeric 450 MG Caps           valACYclovir 1000 mg tablet    VALTREX     Take 1,000 mg by mouth         vitamin D3 2000 units tablet    CHOLECALCIFEROL     Take 2,000 Units by mouth        vitamin E 400 units Tabs      1 Cap daily.

## 2018-11-26 NOTE — PATIENT INSTRUCTIONS
You were seen today in the Cardiovascular Clinic at the [facility].       Cardiology Providers you saw during your visit: Dr. Bonilla         Medication Changes:   1. No medication changes today.      Patient Instructions:  1. COntact us if your shortness of breath gets worse.    Follow up Appointment Information:  1. Follow up in clinic in 1 year with echocardiogram and labs prior.       Results:    Results for MIGUEL MORRISSEY (MRN [id number]) as of 11/26/2018 16:56   Ref. Range 11/26/2018 15:50   Sodium Latest Ref Range: 133 - 144 mmol/L 140   Potassium Latest Ref Range: 3.4 - 5.3 mmol/L 3.7   Chloride Latest Ref Range: 94 - 109 mmol/L 106   Carbon Dioxide Latest Ref Range: 20 - 32 mmol/L 27   Urea Nitrogen Latest Ref Range: 7 - 30 mg/dL 19   Creatinine Latest Ref Range: 0.52 - 1.04 mg/dL 0.74   GFR Estimate Latest Ref Range: >60 mL/min/1.7m2 78   GFR Estimate If Black Latest Ref Range: >60 mL/min/1.7m2 >90   Calcium Latest Ref Range: 8.5 - 10.1 mg/dL 9.0   Anion Gap Latest Ref Range: 3 - 14 mmol/L 7   N-Terminal Pro Bnp Latest Ref Range: 0 - 125 pg/mL 138 (H)   TSH Latest Ref Range: 0.40 - 4.00 mU/L 1.32   Glucose Latest Ref Range: 70 - 99 mg/dL 88   WBC Latest Ref Range: 4.0 - 11.0 10e9/L 7.4   Hemoglobin Latest Ref Range: 11.7 - 15.7 g/dL 13.2   Hematocrit Latest Ref Range: 35.0 - 47.0 % 40.9   Platelet Count Latest Ref Range: 150 - 450 10e9/L 253   RBC Count Latest Ref Range: 3.8 - 5.2 10e12/L 4.13   MCV Latest Ref Range: 78 - 100 fl 99   MCH Latest Ref Range: 26.5 - 33.0 pg 32.0   MCHC Latest Ref Range: 31.5 - 36.5 g/dL 32.3   RDW Latest Ref Range: 10.0 - 15.0 % 14.5       [address] [facility] on [address] [place] [address]        Thank you for allowing us to be a part of your care here at the [facility]      If you have questions or concerns please contact us at:      Norah Guajardo RN BSN   Cardiology Care Coordinator  [facility]  "  Questions and [phone]   First press #1 for the University and then press #3 for \"Medical Advice\" to reach us Cardiology Nurses.        "

## 2018-11-29 NOTE — NURSING NOTE
Per Dr. Bonilla, patient to have Ziopatch 14 day monitor placed.  Diagnosis: Dizziness  Monitor placed: Yes  Patient Instructed: Yes  Patient verbalized understanding: Yes  Holter # [id number]    Placed By :Mer MORGAN

## 2018-12-26 ENCOUNTER — TELEPHONE (OUTPATIENT)
Dept: CARDIOLOGY | Facility: CLINIC | Age: 68
End: 2018-12-26

## 2018-12-26 NOTE — TELEPHONE ENCOUNTER
Romanopatch results reviewed by Dr. Bonilla. No significant arrhythmias noted. Discussed results with patient. No medication changes at this time. We will see patient back in clinic in 1 year as discussed at her last clinic visit. Encouraged her to call us with any worsening dyspnea on exertion or palpitations. Patient agreeable and states no further questions at this time.

## 2019-11-07 DIAGNOSIS — I38 VALVULAR HEART DISEASE: Primary | ICD-10-CM

## 2019-11-07 DIAGNOSIS — I50.30 DIASTOLIC HEART FAILURE (H): ICD-10-CM

## 2019-11-11 ENCOUNTER — ANCILLARY PROCEDURE (OUTPATIENT)
Dept: CARDIOLOGY | Facility: CLINIC | Age: 69
End: 2019-11-11
Attending: INTERNAL MEDICINE
Payer: MEDICARE

## 2019-11-11 VITALS
BODY MASS INDEX: 34.36 KG/M2 | HEIGHT: 60 IN | OXYGEN SATURATION: 95 % | HEART RATE: 61 BPM | WEIGHT: 175 LBS | SYSTOLIC BLOOD PRESSURE: 146 MMHG | DIASTOLIC BLOOD PRESSURE: 84 MMHG

## 2019-11-11 DIAGNOSIS — I38 VALVULAR HEART DISEASE: ICD-10-CM

## 2019-11-11 DIAGNOSIS — I34.0 MITRAL VALVE INSUFFICIENCY, UNSPECIFIED ETIOLOGY: ICD-10-CM

## 2019-11-11 DIAGNOSIS — I50.30 DIASTOLIC HEART FAILURE (H): ICD-10-CM

## 2019-11-11 LAB
ANION GAP SERPL CALCULATED.3IONS-SCNC: 6 MMOL/L (ref 3–14)
BUN SERPL-MCNC: 14 MG/DL (ref 7–30)
CALCIUM SERPL-MCNC: 8.9 MG/DL (ref 8.5–10.1)
CHLORIDE SERPL-SCNC: 105 MMOL/L (ref 94–109)
CO2 SERPL-SCNC: 29 MMOL/L (ref 20–32)
CREAT SERPL-MCNC: 0.78 MG/DL (ref 0.52–1.04)
ERYTHROCYTE [DISTWIDTH] IN BLOOD BY AUTOMATED COUNT: 14.5 % (ref 10–15)
GFR SERPL CREATININE-BSD FRML MDRD: 78 ML/MIN/{1.73_M2}
GLUCOSE SERPL-MCNC: 88 MG/DL (ref 70–99)
HCT VFR BLD AUTO: 42.5 % (ref 35–47)
HGB BLD-MCNC: 13.3 G/DL (ref 11.7–15.7)
MCH RBC QN AUTO: 31.4 PG (ref 26.5–33)
MCHC RBC AUTO-ENTMCNC: 31.3 G/DL (ref 31.5–36.5)
MCV RBC AUTO: 101 FL (ref 78–100)
NT-PROBNP SERPL-MCNC: 91 PG/ML (ref 0–125)
PLATELET # BLD AUTO: 263 10E9/L (ref 150–450)
POTASSIUM SERPL-SCNC: 3.8 MMOL/L (ref 3.4–5.3)
RBC # BLD AUTO: 4.23 10E12/L (ref 3.8–5.2)
SODIUM SERPL-SCNC: 140 MMOL/L (ref 133–144)
WBC # BLD AUTO: 6.5 10E9/L (ref 4–11)

## 2019-11-11 PROCEDURE — 85027 COMPLETE CBC AUTOMATED: CPT | Performed by: INTERNAL MEDICINE

## 2019-11-11 PROCEDURE — 80048 BASIC METABOLIC PNL TOTAL CA: CPT | Performed by: INTERNAL MEDICINE

## 2019-11-11 PROCEDURE — 99215 OFFICE O/P EST HI 40 MIN: CPT | Mod: ZP | Performed by: INTERNAL MEDICINE

## 2019-11-11 PROCEDURE — 36415 COLL VENOUS BLD VENIPUNCTURE: CPT | Performed by: INTERNAL MEDICINE

## 2019-11-11 PROCEDURE — G0463 HOSPITAL OUTPT CLINIC VISIT: HCPCS | Mod: ZF

## 2019-11-11 PROCEDURE — 83880 ASSAY OF NATRIURETIC PEPTIDE: CPT | Performed by: INTERNAL MEDICINE

## 2019-11-11 RX ORDER — SPIRONOLACTONE 25 MG/1
25 TABLET ORAL DAILY
Qty: 90 TABLET | Refills: 3 | Status: SHIPPED | OUTPATIENT
Start: 2019-11-11 | End: 2022-09-18

## 2019-11-11 ASSESSMENT — PAIN SCALES - GENERAL: PAINLEVEL: NO PAIN (0)

## 2019-11-11 ASSESSMENT — MIFFLIN-ST. JEOR: SCORE: 1240.29

## 2019-11-11 NOTE — PATIENT INSTRUCTIONS
You were seen today in the Cardiovascular Clinic at the [facility].       Cardiology Providers you saw during your visit: Dr. Bonilla      Medication Changes:   1. Start spironolactone 25mg once per day.      Patient Instructions:  1. Repeat labs in 2 weeks.    Follow up Appointment Information:  1. Call Dr. Boss's office for a follow up appointment in 6months to 1 year.       Results:    Results for MIGUEL MORRISSEY (MRN [id number]) as of 2019 16:20   Ref. Range 2019 15:42   Sodium Latest Ref Range: 133 - 144 mmol/L 140   Potassium Latest Ref Range: 3.4 - 5.3 mmol/L 3.8   Chloride Latest Ref Range: 94 - 109 mmol/L 105   Carbon Dioxide Latest Ref Range: 20 - 32 mmol/L 29   Urea Nitrogen Latest Ref Range: 7 - 30 mg/dL 14   Creatinine Latest Ref Range: 0.52 - 1.04 mg/dL 0.78   GFR Estimate Latest Ref Range: >60 mL/min/1.73_m2 78   GFR Estimate If Black Latest Ref Range: >60 mL/min/1.73_m2 90   Calcium Latest Ref Range: 8.5 - 10.1 mg/dL 8.9   Anion Gap Latest Ref Range: 3 - 14 mmol/L 6   N-Terminal Pro Bnp Latest Ref Range: 0 - 125 pg/mL 91   Glucose Latest Ref Range: 70 - 99 mg/dL 88   WBC Latest Ref Range: 4.0 - 11.0 10e9/L 6.5   Hemoglobin Latest Ref Range: 11.7 - 15.7 g/dL 13.3   Hematocrit Latest Ref Range: 35.0 - 47.0 % 42.5   Platelet Count Latest Ref Range: 150 - 450 10e9/L 263   RBC Count Latest Ref Range: 3.8 - 5.2 10e12/L 4.23   MCV Latest Ref Range: 78 - 100 fl 101 (H)   MCH Latest Ref Range: 26.5 - 33.0 pg 31.4   MCHC Latest Ref Range: 31.5 - 36.5 g/dL 31.3 (L)   RDW Latest Ref Range: 10.0 - 15.0 % 14.5         [address] on [address] [place] [address]        Thank you for allowing us to be a part of your care here at the [facility]      If you have questions or concerns please contact us at:      Norah Hernandez RN BSN   Cardiology Care Coordinator  [facility]   Questions and [phone]   First press  "#1 for the University and then press #4 to \"send a message to your care team\"     "

## 2019-11-11 NOTE — NURSING NOTE
Diet: Patient instructed regarding a heart failure healthy diet, including discussion of reduced fat and 2000 mg daily sodium restriction, daily weights, medication purpose and compliance, fluid restrictions and resources for patient and family to access for assistance with heart failure management.       Labs: Patient was given results of the laboratory testing obtained today and patient was instructed about when to return for the next laboratory testing. Repeat BMP in 2 weeks.    Med Reconcile: Reviewed and verified all current medications with the patient. The updated medication list was printed and given to the patient. START spironolactone 25mg daily.    Return Appointment: Patient given instructions regarding scheduling next clinic visit. Return to dr gallo at [facility] in 6-12 months.     Patient stated she understood all health information given and agreed to call with further questions or concerns.     Norah Hernandez RN

## 2019-11-11 NOTE — NURSING NOTE
Chief Complaint   Patient presents with     Follow Up      Reason for visit: RTN HF, 69 year old female presents with MR, valvular heart disease, severe asthma/bronchiectasis for follow up with echo and labs prior     Vitals were taken and medications were reconciled.   Jackie Stafford  4:10 PM

## 2019-11-11 NOTE — LETTER
"11/11/2019      RE: Edda Mckeon  [address]  [facility] [address]       November 11, 2019      Angelic Leonard MD   [facility]   [facility]    [address] [place] [address]    [phone]       Dear Dr. Leonard,     Thank you for referring Ms. Edda Mckeon to the Heart Failure Clinic at the [facility]. As you know, Edda Mckeon is a 68 year old female with the following past medical history who presents for follow-up:     1. Severe asthma  2. Bronchiectasis  3. Obstructive sleep apnea, compliant with CPAP  4. Obesity  5. Hypertension  6. Hyperlipidemia  7. ? Diastolic dysfunction  8. Moderate mitral regurgitation  9. Hypothyroidism    She established care with us on 10/22/18 for evaluation of her dyspnea, fatigue, and lightheadedness. She was having this about twice per day. Her cardiac w/u was unrevealing for cause. Since that appointment, she reports that these symptoms have significantly decreased in frequency, happening only 1-2 times per week. She is not sure what is responsible for this change. Over the last year, she notes that her asthma is also improving with some changes to her inhaler regimen. She was also diagnosed with lymphedema which is being treated with wraps. She was trailed on lasix, but this was not helpful, so this was discontinued.     She states that she \"would have orthopnea if she didn't sleep with CPAP\", has PND 1-2 x/week. She still wears O2 at night. She rarely needs to wear O2 during the daytime anymore. She does not feel SOB at rest. She does c/o abdominal bloating and decreased appetite. LE edema which has been attributed to lymphedema as above. No chest pain. Pre-syncopal episodes as above, otherwise no significant lightheadedness or dizziness. Wt is up 6 lbs compared to last year.    Past Surgical History  Caesarian Section     Family History  No family history of myocardial infarction.  Father - palpitations, " CVA  Mother - CVA, had atrial fibrillation ablation  Daughter - Ambrosio    Social History     Tobacco Use     Smoking status: Never Smoker     Smokeless tobacco: Never Used   Substance Use Topics     Alcohol use: Yes     Comment: occs       Current Outpatient Medications   Medication Sig     albuterol (PROAIR HFA/PROVENTIL HFA/VENTOLIN HFA) 108 (90 Base) MCG/ACT inhaler Inhale 1-2 puffs into the lungs     ascorbic acid 500 MG TABS Take 500 mg by mouth     aspirin 81 MG tablet      Cholecalciferol (VITAMIN D) 2000 units tablet Take 2,000 Units by mouth     ciprofloxacin (CIPRO) 500 MG tablet TK 1 T PO  BID FOR 5 DAYS     clotrimazole (MYCELEX) 10 MG LOZG lozenge SLOWLY DISSOLVE 1 T PO FID FOR 14 DAYS.     doxycycline monohydrate (ADOXA) 100 MG tablet TK 1 T PO BID FOR 10 DAYS     fish oil-omega-3 fatty acids 1000 MG capsule Take 2 g by mouth     fluticasone (FLONASE) 50 MCG/ACT spray 2 sprays     furosemide (LASIX) 20 MG tablet TK 1 T PO QAM FOR 3 DAYS.     ipratropium - albuterol 0.5 mg/2.5 mg/3 mL (DUONEB) 0.5-2.5 (3) MG/3ML neb solution Inhale 3 mLs into the lungs     levothyroxine (SYNTHROID/LEVOTHROID) 75 MCG tablet TAKE 1 TABLET BY MOUTH DAILY     Multiple vitamin TABS      nystatin (MYCOSTATIN) 164572 UNIT/ML suspension SAS 5 ML PO BID FOR 7 DAYS     ondansetron (ZOFRAN-ODT) 4 MG ODT tab DISSOLVE ONE T ON TONGUE AND SWALLOW Q 8 H PRF NAUSEA OR VOMITING     order for DME Oxygen for home use. 2 liters per NC during daytime, 4 at night. Frequency: Continuous with portability.; Length of need: 99  Months.     oxyCODONE-acetaminophen (PERCOCET) 5-325 MG per tablet TK ONE TO TWO TS PO Q 6 H PRF PAIN. NM4     polyvinyl alcohol (LIQUIFILM TEARS) 1.4 % ophthalmic solution      predniSONE (DELTASONE) 20 MG tablet      rosuvastatin (CRESTOR) 40 MG tablet Take 40 mg by mouth     sodium chloride 3 % NEBU neb solution INHALE 1 VIAL BY MOUTH VIA NEBULIZER BID     spironolactone (ALDACTONE) 25 MG tablet Take 1 tablet (25 mg)  by mouth daily     tiotropium (SPIRIVA RESPIMAT) 2.5 MCG/ACT inhalation aerosol Inhale 2 puffs into the lungs     tretinoin (RETIN-A) 0.025 % cream Apply to affected areas on cheek in the evenings daily     Turmeric 450 MG CAPS      valACYclovir (VALTREX) 1000 mg tablet Take 1,000 mg by mouth     vitamin E 400 units TABS 1 Cap daily.     mometasone furoate (ASMANEX HFA) 200 MCG/ACT inhaler Inhale 2 puffs into the lungs     No current facility-administered medications for this visit.      ROS:   10 point ROS negative other than what is mentioned above.    EXAM:   BP (!) 146/84 (BP Location: Right arm, Patient Position: Chair, Cuff Size: Adult Regular)   Pulse 61   Ht 1.524 m (5')   Wt 79.4 kg (175 lb)   SpO2 95%   BMI 34.18 kg/m      GENERAL: Alert, sitting in a wheelchair, on RA, speaking in full sentences and able to communicate all needs, in no distress or obvious pain  HEENT:  NC/AT. Sclerae white.  MMM, no oral lesions  NECK: JVD ~7 cm  HEART: RRR, normal S1 and S2, 2/6 ALAN heard at apex  LUNGS:  Clear to auscultation bilaterally, no wheezes, rales, or rhonchi  ABDOMEN: Soft, nontender, nondistended, bowel sounds present, no hepatojugular reflux, no ascites.  EXTREMITIES: Moderate b/l non-pitting edema to the knees, in lymphedema wraps    Labs:  Recent Results (from the past 72 hour(s))   CBC with platelets    Collection Time: 11/11/19  3:42 PM   Result Value Ref Range    WBC 6.5 4.0 - 11.0 10e9/L    RBC Count 4.23 3.8 - 5.2 10e12/L    Hemoglobin 13.3 11.7 - 15.7 g/dL    Hematocrit 42.5 35.0 - 47.0 %     (H) 78 - 100 fl    MCH 31.4 26.5 - 33.0 pg    MCHC 31.3 (L) 31.5 - 36.5 g/dL    RDW 14.5 10.0 - 15.0 %    Platelet Count 263 150 - 450 10e9/L   N terminal pro BNP outpatient    Collection Time: 11/11/19  3:42 PM   Result Value Ref Range    N-Terminal Pro Bnp 91 0 - 125 pg/mL   Basic metabolic panel    Collection Time: 11/11/19  3:42 PM   Result Value Ref Range    Sodium 140 133 - 144 mmol/L     Potassium 3.8 3.4 - 5.3 mmol/L    Chloride 105 94 - 109 mmol/L    Carbon Dioxide 29 20 - 32 mmol/L    Anion Gap 6 3 - 14 mmol/L    Glucose 88 70 - 99 mg/dL    Urea Nitrogen 14 7 - 30 mg/dL    Creatinine 0.78 0.52 - 1.04 mg/dL    GFR Estimate 78 >60 mL/min/[1.73_m2]    GFR Estimate If Black 90 >60 mL/min/[1.73_m2]    Calcium 8.9 8.5 - 10.1 mg/dL         Cardiopulmonary stress test 11/6/18:  1. Subjective response: The patient did not report any symptoms during testing.  2. ECG interpretation:   Resting supine ECG - normal sinus rhythm (HR 60), within normal limits  The stress ECG was negative for ischemia at a diagnostic level of myocardial O2 demand. Substantial EKG artifact.  3. RER: 1.14, VE/VCO2 slope: 26.55    Assessment: The patient achieved a moderately impaired, class II functional capacity with cardiac and ventilatory limitation to exercise. A cardiac limitation is suggested given the low peak VO2 and oxygen pulse. A ventilatory limitation is suggested given the abnormal baseline spirometry and the low breathing reserve. The VE/VCO2 slope was 26.55. The patient did not report any symptoms during testing. The test was terminated at the patient's request secondary to general fatigue.      Lexiscan 11/1/18:  Impression:  1. Normal myocardial SPECT study with a summed stress score of 0.   2. No transmural infarction.   3. No evidence of ischemia.      Echo 11/11/2019  Interpretation Summary  Global and regional left ventricular function is normal with an EF of 55-60%.  Right ventricular function, chamber size, wall motion, and thickness are  normal.  Mild mitral insufficiency is present.  Estimated pulmonary artery pressure of 31 mmHg.  No pericardial effusion is present.  The inferior vena cava was normal in size with preserved respiratory  variability.     There is no prior study for direct comparison.     _____________________________________________________________________________  __        Left  Ventricle  Global and regional left ventricular function is normal with an EF of 55-60%.  Left ventricular wall thickness is normal. Left ventricular size is normal.  Left ventricular diastolic function is normal. No regional wall motion  abnormalities are seen.     Right Ventricle  Right ventricular function, chamber size, wall motion, and thickness are  normal.     Atria  The right atria appears normal. Mild left atrial enlargement is present. The  atrial septum is intact as assessed by color Doppler .     Mitral Valve  Moderate mitral annular calcification is present. Restricted posterior  leaftlet motion. Mild mitral insufficiency is present.        Aortic Valve  Cannot exclude a bicuspid aortic valve. The valve leaflets are not well  visualized. On Doppler interrogation, there is no significant stenosis or  regurgitation.     Tricuspid Valve  The tricuspid valve is normal. Trace tricuspid insufficiency is present. Right  ventricular systolic pressure is 28mmHg above the right atrial pressure.     Pulmonic Valve  The pulmonic valve is normal. Trace pulmonic insufficiency is present.     Vessels  Mildly dilated ascending thoracic aorta indexed to 2.1 cm/m^2. The aorta ro  ot  is normal. The inferior vena cava was normal in size with preserved  respiratory variability. The pulmonary artery and bifurcation cannot be  assessed. IVC diameter <2.1 cm collapsing >50% with sniff suggests a normal RA  pressure of 3 mmHg.     Pericardium  No pericardial effusion is present.        Compared to Previous Study  There is no prior study for direct comparison.    Assessment and Plan:   Edda Mckeon is a 69 year old female with a history of severe asthma, bronchiectasis, LAILA on CPAP, obesity, hypertension, hyperlipidemia, question of diastolic dysfunction, moderate mitral regurgitation, and hypothyroidism who presents for further follow-up.    She had an extensive cardiac work-up last year which was unrevealing for a cause of  her presyncope. Thankfully, since she was seen last year, the frequency of her presyncope has greatly decreased from last year. She was having these symptoms 2 times per day at her last appointment, and she now is having symptoms only 1-2 times per week. She is still having limiting SOB, but she also reports that these symptoms have significantly improved with some changes to her inhalers. She has recently been diagnosed with lymphedema, which she is having treated with wraps. She had a trial of diuretics, which was not helpful.    At this point, evidence for her diastolic function is not terribly convincing. It is possible that she has a small component of HFpEF, but I feel that her SOB is primarily driven by her lung issues. Given the possibility of diastolic dysfunction, we will start aldactone today. Hopefully this will also address her blood pressure and provide her a small diuretic effect as well.     I do not think that she needs to be followed any further by the Advanced Heart Failure Clinic. She will re-establish care with her primary cardiologist within the next year to monitor her possible diastolic dysfunction, hypertension, and to keep an eye on her mitral valve, as she does have mild to moderate MR that has been present on her echocardiograms since 2010. There is no surgical indication at this time and is unlikely to be the cause of her dyspnea and lightheadedness.     I do not think her LE swelling is being primarily caused by her heart. I agree with lymphedema treatment as she is receiving. We will start aldactone as discussed above.   Follow-up: Will reestablish with her primary cardiologist this year. Can be seen in this clinic PRN, but we do not need to schedule follow-up.    Patient seen and discussed with Dr. Bonilla.    Yaima Lake PA-C  [facility]    This is a shared visit Yaima Lake PA-C. I have personally seen and examined the patient, and then discussed with Dr. Love  and agree with the findings and plan in this note. I have reviewed today's vital signs, medications, labs, and imaging.     Sincerely,    Chad Bonilla MD   [facility]  Section of Advanced Heart Failure   Cardiovascular Division  [facility]   [phone]      Chad Bonilla MD

## 2019-11-11 NOTE — PROGRESS NOTES
"November 11, 2019      Angelic Leonard MD   [facility]    [address] [place] [address]    [phone]       Dear Dr. Leonard,     Thank you for referring Ms. Edda Mckeon to the Heart Failure Clinic at the [facility]. As you know, Edda Mckeon is a 68 year old female with the following past medical history who presents for follow-up:     1. Severe asthma  2. Bronchiectasis  3. Obstructive sleep apnea, compliant with CPAP  4. Obesity  5. Hypertension  6. Hyperlipidemia  7. ? Diastolic dysfunction  8. Moderate mitral regurgitation  9. Hypothyroidism    She established care with us on 10/22/18 for evaluation of her dyspnea, fatigue, and lightheadedness. She was having this about twice per day. Her cardiac w/u was unrevealing for cause. Since that appointment, she reports that these symptoms have significantly decreased in frequency, happening only 1-2 times per week. She is not sure what is responsible for this change. Over the last year, she notes that her asthma is also improving with some changes to her inhaler regimen. She was also diagnosed with lymphedema which is being treated with wraps. She was trailed on lasix, but this was not helpful, so this was discontinued.     She states that she \"would have orthopnea if she didn't sleep with CPAP\", has PND 1-2 x/week. She still wears O2 at night. She rarely needs to wear O2 during the daytime anymore. She does not feel SOB at rest. She does c/o abdominal bloating and decreased appetite. LE edema which has been attributed to lymphedema as above. No chest pain. Pre-syncopal episodes as above, otherwise no significant lightheadedness or dizziness. Wt is up 6 lbs compared to last year.    Past Surgical History  Caesarian Section     Family History  No family history of myocardial infarction.  Father - palpitations, CVA  Mother - CVA, had atrial fibrillation ablation  Daughter - [place]    Social " History     Tobacco Use     Smoking status: Never Smoker     Smokeless tobacco: Never Used   Substance Use Topics     Alcohol use: Yes     Comment: occs       Current Outpatient Medications   Medication Sig     albuterol (PROAIR HFA/PROVENTIL HFA/VENTOLIN HFA) 108 (90 Base) MCG/ACT inhaler Inhale 1-2 puffs into the lungs     ascorbic acid 500 MG TABS Take 500 mg by mouth     aspirin 81 MG tablet      Cholecalciferol (VITAMIN D) 2000 units tablet Take 2,000 Units by mouth     ciprofloxacin (CIPRO) 500 MG tablet TK 1 T PO  BID FOR 5 DAYS     clotrimazole (MYCELEX) 10 MG LOZG lozenge SLOWLY DISSOLVE 1 T PO FID FOR 14 DAYS.     doxycycline monohydrate (ADOXA) 100 MG tablet TK 1 T PO BID FOR 10 DAYS     fish oil-omega-3 fatty acids 1000 MG capsule Take 2 g by mouth     fluticasone (FLONASE) 50 MCG/ACT spray 2 sprays     furosemide (LASIX) 20 MG tablet TK 1 T PO QAM FOR 3 DAYS.     ipratropium - albuterol 0.5 mg/2.5 mg/3 mL (DUONEB) 0.5-2.5 (3) MG/3ML neb solution Inhale 3 mLs into the lungs     levothyroxine (SYNTHROID/LEVOTHROID) 75 MCG tablet TAKE 1 TABLET BY MOUTH DAILY     Multiple vitamin TABS      nystatin (MYCOSTATIN) 516158 UNIT/ML suspension SAS 5 ML PO BID FOR 7 DAYS     ondansetron (ZOFRAN-ODT) 4 MG ODT tab DISSOLVE ONE T ON TONGUE AND SWALLOW Q 8 H PRF NAUSEA OR VOMITING     order for DME Oxygen for home use. 2 liters per NC during daytime, 4 at night. Frequency: Continuous with portability.; Length of need: 99  Months.     oxyCODONE-acetaminophen (PERCOCET) 5-325 MG per tablet TK ONE TO TWO TS PO Q 6 H PRF PAIN. NM4     polyvinyl alcohol (LIQUIFILM TEARS) 1.4 % ophthalmic solution      predniSONE (DELTASONE) 20 MG tablet      rosuvastatin (CRESTOR) 40 MG tablet Take 40 mg by mouth     sodium chloride 3 % NEBU neb solution INHALE 1 VIAL BY MOUTH VIA NEBULIZER BID     spironolactone (ALDACTONE) 25 MG tablet Take 1 tablet (25 mg) by mouth daily     tiotropium (SPIRIVA RESPIMAT) 2.5 MCG/ACT inhalation aerosol  Inhale 2 puffs into the lungs     tretinoin (RETIN-A) 0.025 % cream Apply to affected areas on cheek in the evenings daily     Turmeric 450 MG CAPS      valACYclovir (VALTREX) 1000 mg tablet Take 1,000 mg by mouth     vitamin E 400 units TABS 1 Cap daily.     mometasone furoate (ASMANEX HFA) 200 MCG/ACT inhaler Inhale 2 puffs into the lungs     No current facility-administered medications for this visit.      ROS:   10 point ROS negative other than what is mentioned above.    EXAM:   BP (!) 146/84 (BP Location: Right arm, Patient Position: Chair, Cuff Size: Adult Regular)   Pulse 61   Ht 1.524 m (5')   Wt 79.4 kg (175 lb)   SpO2 95%   BMI 34.18 kg/m     GENERAL: Alert, sitting in a wheelchair, on RA, speaking in full sentences and able to communicate all needs, in no distress or obvious pain  HEENT:  NC/AT. Sclerae white.  MMM, no oral lesions  NECK: JVD ~7 cm  HEART: RRR, normal S1 and S2, 2/6 ALAN heard at apex  LUNGS:  Clear to auscultation bilaterally, no wheezes, rales, or rhonchi  ABDOMEN: Soft, nontender, nondistended, bowel sounds present, no hepatojugular reflux, no ascites.  EXTREMITIES: Moderate b/l non-pitting edema to the knees, in lymphedema wraps    Labs:  Recent Results (from the past 72 hour(s))   CBC with platelets    Collection Time: 11/11/19  3:42 PM   Result Value Ref Range    WBC 6.5 4.0 - 11.0 10e9/L    RBC Count 4.23 3.8 - 5.2 10e12/L    Hemoglobin 13.3 11.7 - 15.7 g/dL    Hematocrit 42.5 35.0 - 47.0 %     (H) 78 - 100 fl    MCH 31.4 26.5 - 33.0 pg    MCHC 31.3 (L) 31.5 - 36.5 g/dL    RDW 14.5 10.0 - 15.0 %    Platelet Count 263 150 - 450 10e9/L   N terminal pro BNP outpatient    Collection Time: 11/11/19  3:42 PM   Result Value Ref Range    N-Terminal Pro Bnp 91 0 - 125 pg/mL   Basic metabolic panel    Collection Time: 11/11/19  3:42 PM   Result Value Ref Range    Sodium 140 133 - 144 mmol/L    Potassium 3.8 3.4 - 5.3 mmol/L    Chloride 105 94 - 109 mmol/L    Carbon Dioxide 29 20  - 32 mmol/L    Anion Gap 6 3 - 14 mmol/L    Glucose 88 70 - 99 mg/dL    Urea Nitrogen 14 7 - 30 mg/dL    Creatinine 0.78 0.52 - 1.04 mg/dL    GFR Estimate 78 >60 mL/min/[1.73_m2]    GFR Estimate If Black 90 >60 mL/min/[1.73_m2]    Calcium 8.9 8.5 - 10.1 mg/dL         Cardiopulmonary stress test 11/6/18:  1. Subjective response: The patient did not report any symptoms during testing.  2. ECG interpretation:   Resting supine ECG - normal sinus rhythm (HR 60), within normal limits  The stress ECG was negative for ischemia at a diagnostic level of myocardial O2 demand. Substantial EKG artifact.  3. RER: 1.14, VE/VCO2 slope: 26.55    Assessment: The patient achieved a moderately impaired, class II functional capacity with cardiac and ventilatory limitation to exercise. A cardiac limitation is suggested given the low peak VO2 and oxygen pulse. A ventilatory limitation is suggested given the abnormal baseline spirometry and the low breathing reserve. The VE/VCO2 slope was 26.55. The patient did not report any symptoms during testing. The test was terminated at the patient's request secondary to general fatigue.      Lexiscan 11/1/18:  Impression:  1. Normal myocardial SPECT study with a summed stress score of 0.   2. No transmural infarction.   3. No evidence of ischemia.      Echo 11/11/2019  Interpretation Summary  Global and regional left ventricular function is normal with an EF of 55-60%.  Right ventricular function, chamber size, wall motion, and thickness are  normal.  Mild mitral insufficiency is present.  Estimated pulmonary artery pressure of 31 mmHg.  No pericardial effusion is present.  The inferior vena cava was normal in size with preserved respiratory  variability.     There is no prior study for direct comparison.     _____________________________________________________________________________  __        Left Ventricle  Global and regional left ventricular function is normal with an EF of 55-60%.  Left  ventricular wall thickness is normal. Left ventricular size is normal.  Left ventricular diastolic function is normal. No regional wall motion  abnormalities are seen.     Right Ventricle  Right ventricular function, chamber size, wall motion, and thickness are  normal.     Atria  The right atria appears normal. Mild left atrial enlargement is present. The  atrial septum is intact as assessed by color Doppler .     Mitral Valve  Moderate mitral annular calcification is present. Restricted posterior  leaftlet motion. Mild mitral insufficiency is present.        Aortic Valve  Cannot exclude a bicuspid aortic valve. The valve leaflets are not well  visualized. On Doppler interrogation, there is no significant stenosis or  regurgitation.     Tricuspid Valve  The tricuspid valve is normal. Trace tricuspid insufficiency is present. Right  ventricular systolic pressure is 28mmHg above the right atrial pressure.     Pulmonic Valve  The pulmonic valve is normal. Trace pulmonic insufficiency is present.     Vessels  Mildly dilated ascending thoracic aorta indexed to 2.1 cm/m^2. The aorta ro  ot  is normal. The inferior vena cava was normal in size with preserved  respiratory variability. The pulmonary artery and bifurcation cannot be  assessed. IVC diameter <2.1 cm collapsing >50% with sniff suggests a normal RA  pressure of 3 mmHg.     Pericardium  No pericardial effusion is present.        Compared to Previous Study  There is no prior study for direct comparison.    Assessment and Plan:   Edda Mckeon is a 69 year old female with a history of severe asthma, bronchiectasis, LAILA on CPAP, obesity, hypertension, hyperlipidemia, question of diastolic dysfunction, moderate mitral regurgitation, and hypothyroidism who presents for further follow-up.    She had an extensive cardiac work-up last year which was unrevealing for a cause of her presyncope. Thankfully, since she was seen last year, the frequency of her presyncope has  greatly decreased from last year. She was having these symptoms 2 times per day at her last appointment, and she now is having symptoms only 1-2 times per week. She is still having limiting SOB, but she also reports that these symptoms have significantly improved with some changes to her inhalers. She has recently been diagnosed with lymphedema, which she is having treated with wraps. She had a trial of diuretics, which was not helpful.    At this point, evidence for her diastolic function is not terribly convincing. It is possible that she has a small component of HFpEF, but I feel that her SOB is primarily driven by her lung issues. Given the possibility of diastolic dysfunction, we will start aldactone today. Hopefully this will also address her blood pressure and provide her a small diuretic effect as well.     I do not think that she needs to be followed any further by the Advanced [facility] Failure [facility]. She will re-establish care with her primary cardiologist within the next year to monitor her possible diastolic dysfunction, hypertension, and to keep an eye on her mitral valve, as she does have mild to moderate MR that has been present on her echocardiograms since 2010. There is no surgical indication at this time and is unlikely to be the cause of her dyspnea and lightheadedness.     I do not think her LE swelling is being primarily caused by her heart. I agree with lymphedema treatment as she is receiving. We will start aldactone as discussed above.   Follow-up: Will reestablish with her primary cardiologist this year. Can be seen in this clinic PRN, but we do not need to schedule follow-up.    Patient seen and discussed with Dr. Bonilla.    Yaima Lake PA-C  [facility]    This is a shared visit Yaima Lake PA-C. I have personally seen and examined the patient, and then discussed with Dr. Love, and agree with the findings and plan in this note. I have reviewed today's vital signs,  medications, labs, and imaging.     Sincerely,    Chad Bonilla MD   [facility]  Section of Advanced Heart Failure   Cardiovascular Division  [facility]   [phone]

## 2019-11-11 NOTE — LETTER
"11/11/2019      RE: Edda Mckeon  [address]  [facility] [address]       Dear Colleague,    Thank you for the opportunity to participate in the care of your patient, Edda Mckeon, at the [facility] at [facility]. Please see a copy of my visit note below.    November 11, 2019      Angelic Leonard MD   [facility]   [facility]    [address] [place] [address]    [phone]       Dear Dr. Leonard,     Thank you for referring Ms. Edda Mckeon to the Heart Failure Clinic at the [facility]. As you know, Edda Mckeon is a 68 year old female with the following past medical history who presents for follow-up:     1. Severe asthma  2. Bronchiectasis  3. Obstructive sleep apnea, compliant with CPAP  4. Obesity  5. Hypertension  6. Hyperlipidemia  7. ? Diastolic dysfunction  8. Moderate mitral regurgitation  9. Hypothyroidism    She established care with us on 10/22/18 for evaluation of her dyspnea, fatigue, and lightheadedness. She was having this about twice per day. Her cardiac w/u was unrevealing for cause. Since that appointment, she reports that these symptoms have significantly decreased in frequency, happening only 1-2 times per week. She is not sure what is responsible for this change. Over the last year, she notes that her asthma is also improving with some changes to her inhaler regimen. She was also diagnosed with lymphedema which is being treated with wraps. She was trailed on lasix, but this was not helpful, so this was discontinued.     She states that she \"would have orthopnea if she didn't sleep with CPAP\", has PND 1-2 x/week. She still wears O2 at night. She rarely needs to wear O2 during the daytime anymore. She does not feel SOB at rest. She does c/o abdominal bloating and decreased appetite. LE edema which has been attributed to lymphedema as above. No chest pain. Pre-syncopal episodes as " above, otherwise no significant lightheadedness or dizziness. Wt is up 6 lbs compared to last year.    Past Surgical History  Caesarian Section     Family History  No family history of myocardial infarction.  Father - palpitations, CVA  Mother - CVA, had atrial fibrillation ablation  Daughter - Moy[place]oya    Social History     Tobacco Use     Smoking status: Never Smoker     Smokeless tobacco: Never Used   Substance Use Topics     Alcohol use: Yes     Comment: occs       Current Outpatient Medications   Medication Sig     albuterol (PROAIR HFA/PROVENTIL HFA/VENTOLIN HFA) 108 (90 Base) MCG/ACT inhaler Inhale 1-2 puffs into the lungs     ascorbic acid 500 MG TABS Take 500 mg by mouth     aspirin 81 MG tablet      Cholecalciferol (VITAMIN D) 2000 units tablet Take 2,000 Units by mouth     ciprofloxacin (CIPRO) 500 MG tablet TK 1 T PO  BID FOR 5 DAYS     clotrimazole (MYCELEX) 10 MG LOZG lozenge SLOWLY DISSOLVE 1 T PO FID FOR 14 DAYS.     doxycycline monohydrate (ADOXA) 100 MG tablet TK 1 T PO BID FOR 10 DAYS     fish oil-omega-3 fatty acids 1000 MG capsule Take 2 g by mouth     fluticasone (FLONASE) 50 MCG/ACT spray 2 sprays     furosemide (LASIX) 20 MG tablet TK 1 T PO QAM FOR 3 DAYS.     ipratropium - albuterol 0.5 mg/2.5 mg/3 mL (DUONEB) 0.5-2.5 (3) MG/3ML neb solution Inhale 3 mLs into the lungs     levothyroxine (SYNTHROID/LEVOTHROID) 75 MCG tablet TAKE 1 TABLET BY MOUTH DAILY     Multiple vitamin TABS      nystatin (MYCOSTATIN) 200846 UNIT/ML suspension SAS 5 ML PO BID FOR 7 DAYS     ondansetron (ZOFRAN-ODT) 4 MG ODT tab DISSOLVE ONE T ON TONGUE AND SWALLOW Q 8 H PRF NAUSEA OR VOMITING     order for DME Oxygen for home use. 2 liters per NC during daytime, 4 at night. Frequency: Continuous with portability.; Length of need: 99  Months.     oxyCODONE-acetaminophen (PERCOCET) 5-325 MG per tablet TK ONE TO TWO TS PO Q 6 H PRF PAIN. NM4     polyvinyl alcohol (LIQUIFILM TEARS) 1.4 % ophthalmic solution      predniSONE  (DELTASONE) 20 MG tablet      rosuvastatin (CRESTOR) 40 MG tablet Take 40 mg by mouth     sodium chloride 3 % NEBU neb solution INHALE 1 VIAL BY MOUTH VIA NEBULIZER BID     spironolactone (ALDACTONE) 25 MG tablet Take 1 tablet (25 mg) by mouth daily     tiotropium (SPIRIVA RESPIMAT) 2.5 MCG/ACT inhalation aerosol Inhale 2 puffs into the lungs     tretinoin (RETIN-A) 0.025 % cream Apply to affected areas on cheek in the evenings daily     Turmeric 450 MG CAPS      valACYclovir (VALTREX) 1000 mg tablet Take 1,000 mg by mouth     vitamin E 400 units TABS 1 Cap daily.     mometasone furoate (ASMANEX HFA) 200 MCG/ACT inhaler Inhale 2 puffs into the lungs     No current facility-administered medications for this visit.      ROS:   10 point ROS negative other than what is mentioned above.    EXAM:   BP (!) 146/84 (BP Location: Right arm, Patient Position: Chair, Cuff Size: Adult Regular)   Pulse 61   Ht 1.524 m (5')   Wt 79.4 kg (175 lb)   SpO2 95%   BMI 34.18 kg/m      GENERAL: Alert, sitting in a wheelchair, on RA, speaking in full sentences and able to communicate all needs, in no distress or obvious pain  HEENT:  NC/AT. Sclerae white.  MMM, no oral lesions  NECK: JVD ~7 cm  HEART: RRR, normal S1 and S2, 2/6 ALAN heard at apex  LUNGS:  Clear to auscultation bilaterally, no wheezes, rales, or rhonchi  ABDOMEN: Soft, nontender, nondistended, bowel sounds present, no hepatojugular reflux, no ascites.  EXTREMITIES: Moderate b/l non-pitting edema to the knees, in lymphedema wraps    Labs:  Recent Results (from the past 72 hour(s))   CBC with platelets    Collection Time: 11/11/19  3:42 PM   Result Value Ref Range    WBC 6.5 4.0 - 11.0 10e9/L    RBC Count 4.23 3.8 - 5.2 10e12/L    Hemoglobin 13.3 11.7 - 15.7 g/dL    Hematocrit 42.5 35.0 - 47.0 %     (H) 78 - 100 fl    MCH 31.4 26.5 - 33.0 pg    MCHC 31.3 (L) 31.5 - 36.5 g/dL    RDW 14.5 10.0 - 15.0 %    Platelet Count 263 150 - 450 10e9/L   N terminal pro BNP  outpatient    Collection Time: 11/11/19  3:42 PM   Result Value Ref Range    N-Terminal Pro Bnp 91 0 - 125 pg/mL   Basic metabolic panel    Collection Time: 11/11/19  3:42 PM   Result Value Ref Range    Sodium 140 133 - 144 mmol/L    Potassium 3.8 3.4 - 5.3 mmol/L    Chloride 105 94 - 109 mmol/L    Carbon Dioxide 29 20 - 32 mmol/L    Anion Gap 6 3 - 14 mmol/L    Glucose 88 70 - 99 mg/dL    Urea Nitrogen 14 7 - 30 mg/dL    Creatinine 0.78 0.52 - 1.04 mg/dL    GFR Estimate 78 >60 mL/min/[1.73_m2]    GFR Estimate If Black 90 >60 mL/min/[1.73_m2]    Calcium 8.9 8.5 - 10.1 mg/dL         Cardiopulmonary stress test 11/6/18:  1. Subjective response: The patient did not report any symptoms during testing.  2. ECG interpretation:   Resting supine ECG - normal sinus rhythm (HR 60), within normal limits  The stress ECG was negative for ischemia at a diagnostic level of myocardial O2 demand. Substantial EKG artifact.  3. RER: 1.14, VE/VCO2 slope: 26.55    Assessment: The patient achieved a moderately impaired, class II functional capacity with cardiac and ventilatory limitation to exercise. A cardiac limitation is suggested given the low peak VO2 and oxygen pulse. A ventilatory limitation is suggested given the abnormal baseline spirometry and the low breathing reserve. The VE/VCO2 slope was 26.55. The patient did not report any symptoms during testing. The test was terminated at the patient's request secondary to general fatigue.      Lexiscan 11/1/18:  Impression:  1. Normal myocardial SPECT study with a summed stress score of 0.   2. No transmural infarction.   3. No evidence of ischemia.      Echo 11/11/2019  Interpretation Summary  Global and regional left ventricular function is normal with an EF of 55-60%.  Right ventricular function, chamber size, wall motion, and thickness are  normal.  Mild mitral insufficiency is present.  Estimated pulmonary artery pressure of 31 mmHg.  No pericardial effusion is present.  The  inferior vena cava was normal in size with preserved respiratory  variability.     There is no prior study for direct comparison.     _____________________________________________________________________________  __        Left Ventricle  Global and regional left ventricular function is normal with an EF of 55-60%.  Left ventricular wall thickness is normal. Left ventricular size is normal.  Left ventricular diastolic function is normal. No regional wall motion  abnormalities are seen.     Right Ventricle  Right ventricular function, chamber size, wall motion, and thickness are  normal.     Atria  The right atria appears normal. Mild left atrial enlargement is present. The  atrial septum is intact as assessed by color Doppler .     Mitral Valve  Moderate mitral annular calcification is present. Restricted posterior  leaftlet motion. Mild mitral insufficiency is present.        Aortic Valve  Cannot exclude a bicuspid aortic valve. The valve leaflets are not well  visualized. On Doppler interrogation, there is no significant stenosis or  regurgitation.     Tricuspid Valve  The tricuspid valve is normal. Trace tricuspid insufficiency is present. Right  ventricular systolic pressure is 28mmHg above the right atrial pressure.     Pulmonic Valve  The pulmonic valve is normal. Trace pulmonic insufficiency is present.     Vessels  Mildly dilated ascending thoracic aorta indexed to 2.1 cm/m^2. The aorta ro  ot  is normal. The inferior vena cava was normal in size with preserved  respiratory variability. The pulmonary artery and bifurcation cannot be  assessed. IVC diameter <2.1 cm collapsing >50% with sniff suggests a normal RA  pressure of 3 mmHg.     Pericardium  No pericardial effusion is present.        Compared to Previous Study  There is no prior study for direct comparison.    Assessment and Plan:   Edda Mckeon is a 69 year old female with a history of severe asthma, bronchiectasis, LAILA on CPAP, obesity,  hypertension, hyperlipidemia, question of diastolic dysfunction, moderate mitral regurgitation, and hypothyroidism who presents for further follow-up.    She had an extensive cardiac work-up last year which was unrevealing for a cause of her presyncope. Thankfully, since she was seen last year, the frequency of her presyncope has greatly decreased from last year. She was having these symptoms 2 times per day at her last appointment, and she now is having symptoms only 1-2 times per week. She is still having limiting SOB, but she also reports that these symptoms have significantly improved with some changes to her inhalers. She has recently been diagnosed with lymphedema, which she is having treated with wraps. She had a trial of diuretics, which was not helpful.    At this point, evidence for her diastolic function is not terribly convincing. It is possible that she has a small component of HFpEF, but I feel that her SOB is primarily driven by her lung issues. Given the possibility of diastolic dysfunction, we will start aldactone today. Hopefully this will also address her blood pressure and provide her a small diuretic effect as well.     I do not think that she needs to be followed any further by the Advanced Heart Failure Clinic. She will re-establish care with her primary cardiologist within the next year to monitor her possible diastolic dysfunction, hypertension, and to keep an eye on her mitral valve, as she does have mild to moderate MR that has been present on her echocardiograms since 2010. There is no surgical indication at this time and is unlikely to be the cause of her dyspnea and lightheadedness.     I do not think her LE swelling is being primarily caused by her heart. I agree with lymphedema treatment as she is receiving. We will start aldactone as discussed above.   Follow-up: Will reestablish with her primary cardiologist this year. Can be seen in this clinic PRN, but we do not need to schedule  follow-up.    Patient seen and discussed with Dr. Bonilla.    Yaima Lake PA-C  [facility]    This is a shared visit Yaima Lake PA-C. I have personally seen and examined the patient, and then discussed with Dr. Love, and agree with the findings and plan in this note. I have reviewed today's vital signs, medications, labs, and imaging.     Sincerely,    Chad Bonilla MD   [facility]  Section of Advanced Heart Failure   Cardiovascular Division  [facility]   [phone]

## 2019-12-15 ENCOUNTER — HEALTH MAINTENANCE LETTER (OUTPATIENT)
Age: 69
End: 2019-12-15

## 2019-12-16 ENCOUNTER — MEDICAL CORRESPONDENCE (OUTPATIENT)
Dept: HEALTH INFORMATION MANAGEMENT | Facility: CLINIC | Age: 69
End: 2019-12-16

## 2021-01-15 ENCOUNTER — HEALTH MAINTENANCE LETTER (OUTPATIENT)
Age: 71
End: 2021-01-15

## 2021-05-27 ENCOUNTER — TELEPHONE (OUTPATIENT)
Dept: CARDIOLOGY | Facility: CLINIC | Age: 71
End: 2021-05-27

## 2021-05-27 NOTE — TELEPHONE ENCOUNTER
Returned call to Edda. She reports she had a 'mild stroke' in the last week and reports she thinks it likely happened Friday night but was detected by MRI on Tuesday. She wonders if this could be caused by her heart. She has been having severe vertigo, feeling dizzy for the last few months. She also endorses feelings of stomach pain and some chest pain ongoing for the last few months. She is still having dizzy spells and neuropathy. She is having trouble walking (even before the stroke). She is requesting an echocardiogram to further evaluate her heart. She is anxious and worried about having another stroke. She is unsure what next steps she should take. Has appt with Dr Garcia scheduled for 6/11. She wonders if she should see Dr. Godinez with an echo instead.  WIll route to dr godinez and get back to Edda.

## 2021-05-27 NOTE — TELEPHONE ENCOUNTER
[facility] Call Center    Phone Message    May a detailed message be left on voicemail: yes     Reason for Call: Other: Pt would like a call back to discuss the stroke she had a few days ago and she feels it is caused by her heart problems and would like to discuss     Action Taken: Message routed to:  [facility] ([facility] Cardio    Travel Screening: Not Applicable

## 2021-05-28 ENCOUNTER — TELEPHONE (OUTPATIENT)
Dept: CARDIOLOGY | Facility: CLINIC | Age: 71
End: 2021-05-28

## 2021-05-28 NOTE — TELEPHONE ENCOUNTER
Returned call to Edda. She states her primary care is asking her to go to emergency room but she is hesitant as she has to care for her daughter. She is distressed by the news that she has had a stroke. She doesn't want to upset her doctor or for something bad to happen to her. She thinks she will likely try to get in to the urgency room. Agreed that her PCP has good reasons for asking her to go in and that this is not a bad idea. She will try to get in and get an echo done as she is worried that her stroke could have been caused by her heart. Confirmed that message has been sent to DR. Jean requesting order for echo and that we would call her back once we hear from him. She states understanding.

## 2021-05-28 NOTE — TELEPHONE ENCOUNTER
[facility] Call Center    Phone Message    May a detailed message be left on voicemail: no     Reason for Call: Other: Edda called to talk with the nurse regarding an ECHO order. Please reach back out to edda at [phone].     Action Taken: Message routed to:  [facility] Cardiology    Travel Screening: Not Applicable

## 2021-06-01 ENCOUNTER — TELEPHONE (OUTPATIENT)
Dept: CARDIOLOGY | Facility: CLINIC | Age: 71
End: 2021-06-01

## 2021-06-02 ENCOUNTER — RECORDS - HEALTHEAST (OUTPATIENT)
Dept: ADMINISTRATIVE | Facility: CLINIC | Age: 71
End: 2021-06-02

## 2021-06-02 NOTE — TELEPHONE ENCOUNTER
Discussed with Dr. Bonilla. At this time, he recommends she follow with her regular cardiologist as planned at her last clinic visit with him in 11/2019.   Called Edda to discuss. She is disappointed to hear that she won't get the echo order. Discussed that Dr. Boss will see her next week and determine what testing is needed. I told Edda if she felt she needed something sooner than that, that she should call her primary to see if they would order testing/evaluation. She states understanding, although disappointed. She reports she hopes Dr. Bonilla will be her doctor in the future as 'her heart issues progress.'

## 2021-06-09 NOTE — PROGRESS NOTES
CARDIOLOGY CONSULT    REASON FOR CONSULT: palpitations    PRIMARY CARE PHYSICIAN:  Emi Monk    HISTORY OF PRESENT ILLNESS:  Ms. Mckeon is a 68 year old female with the following past medical history:     1. Severe asthma  2. Bronchiectasis  3. Obstructive sleep apnea, compliant with CPAP  4. Obesity  5. Hypertension  6. Hyperlipidemia  7. ? Diastolic dysfunction  8. Moderate mitral regurgitation  9. Hypothyroidism     She was last seen by Dr. Bonilla in 11/2019.  More recently, she has had concerns with dizziness.  She had an MRI with some small vessel disease.  She was seen by neurology yesterday who did not feel she had a stroke (this was her concern).  Her dizziness is positional with head turns and she will be having vestibular PT in the near future.  Edda reports her shortness of breath is at baseline.  She has had some HR variability with HR's up to 140 on her smart watch.  This has not been an issue in the pastt week or so.  She states her thyroid medication was adjusted recently.        PAST MEDICAL HISTORY:  No past medical history on file.    MEDICATIONS:  Current Outpatient Medications   Medication     albuterol (PROAIR HFA/PROVENTIL HFA/VENTOLIN HFA) 108 (90 Base) MCG/ACT inhaler     ascorbic acid 500 MG TABS     aspirin 81 MG tablet     Cholecalciferol (VITAMIN D) 2000 units tablet     ciprofloxacin (CIPRO) 500 MG tablet     clotrimazole (MYCELEX) 10 MG LOZG lozenge     doxycycline monohydrate (ADOXA) 100 MG tablet     fish oil-omega-3 fatty acids 1000 MG capsule     fluticasone (FLONASE) 50 MCG/ACT spray     furosemide (LASIX) 20 MG tablet     ipratropium - albuterol 0.5 mg/2.5 mg/3 mL (DUONEB) 0.5-2.5 (3) MG/3ML neb solution     levothyroxine (SYNTHROID/LEVOTHROID) 75 MCG tablet     mometasone furoate (ASMANEX HFA) 200 MCG/ACT inhaler     Multiple vitamin TABS     nystatin (MYCOSTATIN) 556528 UNIT/ML suspension     ondansetron (ZOFRAN-ODT) 4 MG ODT tab     order for DME      oxyCODONE-acetaminophen (PERCOCET) 5-325 MG per tablet     polyvinyl alcohol (LIQUIFILM TEARS) 1.4 % ophthalmic solution     predniSONE (DELTASONE) 20 MG tablet     rosuvastatin (CRESTOR) 40 MG tablet     sodium chloride 3 % NEBU neb solution     spironolactone (ALDACTONE) 25 MG tablet     tiotropium (SPIRIVA RESPIMAT) 2.5 MCG/ACT inhalation aerosol     tretinoin (RETIN-A) 0.025 % cream     Turmeric 450 MG CAPS     valACYclovir (VALTREX) 1000 mg tablet     vitamin E 400 units TABS     No current facility-administered medications for this visit.        ALLERGIES:  Allergies   Allergen Reactions     Atorvastatin      Muscle aches     Hmg-Coa-R Inhibitors      Muscle aches     Pravastatin      Muscle aches     Scopolamine Nausea and Vomiting and Swelling       SOCIAL HISTORY:  I have reviewed this patient's social history and updated it with pertinent information if needed. Edda Mckeon  reports that she has never smoked. She has never used smokeless tobacco. She reports current alcohol use. She reports that she does not use drugs.    FAMILY HISTORY:  I have reviewed this patient's family history and updated it with pertinent information if needed.   No family history on file.    REVIEW OF SYSTEMS:  A complete ROS was obtained and the pertinent positives are outlined in the history of present illness above.  The remainder of systems is negative.      PHYSICAL EXAM:                     Vital Signs with Ranges  BP: ()/()   Arterial Line BP: ()/()   0 lbs 0 oz    Constitutional: awake, alert, no distress  Eyes: PERRL, sclera nonicteric  ENT: trachea midline  Respiratory: CTAB  Cardiovascular: RRR no m/r/g, no JVD  GI: nondistended, nontender, bowel sounds present  Lymph/Hematologic: no lymphadenopathy  Skin: dry, no rash  Musculoskeletal: good muscle tone, no edema bilaterally  Neurologic: no focal deficits  Neuropsychiatric: appropriate affact      ASSESSMENT:  1.  Palpitations  2.  Dizziness; felt to be vestibular in  origin  3.  LAILA:  Compliant with CPAP  4.  Obesity  5.  Hypertension:  At goal  6.  Mitral regurgitation:  Mild by most recent echocardiogram  7.  Hypothyroidism    RECOMMENDATIONS:  1.  Will obtain an echocardiogram to evaluate LV function and follow up MR.  Plan for 14 day zio patch monitor to further assess palpitations.  She was hyperthyroid on her most recent lab work and her synthroid as adjusted-this may have caused the palpitations which she states have improved over the past week.  2.  Will review results of the above studies.  Further recommendations to follow.     Ariadne Boss MD  Cardiology - [facility]  June 11, 2021

## 2021-06-11 ENCOUNTER — OFFICE VISIT (OUTPATIENT)
Dept: CARDIOLOGY | Facility: CLINIC | Age: 71
End: 2021-06-11
Payer: COMMERCIAL

## 2021-06-11 VITALS
HEART RATE: 67 BPM | SYSTOLIC BLOOD PRESSURE: 119 MMHG | WEIGHT: 165 LBS | OXYGEN SATURATION: 94 % | BODY MASS INDEX: 32.22 KG/M2 | DIASTOLIC BLOOD PRESSURE: 79 MMHG

## 2021-06-11 DIAGNOSIS — R00.2 PALPITATIONS: ICD-10-CM

## 2021-06-11 DIAGNOSIS — I38 VALVULAR HEART DISEASE: Primary | ICD-10-CM

## 2021-06-11 PROCEDURE — 93000 ELECTROCARDIOGRAM COMPLETE: CPT | Performed by: INTERNAL MEDICINE

## 2021-06-11 PROCEDURE — 99214 OFFICE O/P EST MOD 30 MIN: CPT | Performed by: INTERNAL MEDICINE

## 2021-06-11 NOTE — PATIENT INSTRUCTIONS
-Schedule echocardiogram  -Schedule 14 day ZIO patch monitor   -Dr. Boss will review results; further recommendations to follow

## 2021-06-11 NOTE — LETTER
6/11/2021    Emi Monk MD  [place] [address] [place] [address]    RE: Edda Mckeon       Dear Colleague,    I had the pleasure of seeing Edda Mckeon in the [facility].    CARDIOLOGY CONSULT    REASON FOR CONSULT: palpitations    PRIMARY CARE PHYSICIAN:  Emi Monk    HISTORY OF PRESENT ILLNESS:  Ms. Mckeon is a 68 year old female with the following past medical history:     1. Severe asthma  2. Bronchiectasis  3. Obstructive sleep apnea, compliant with CPAP  4. Obesity  5. Hypertension  6. Hyperlipidemia  7. ? Diastolic dysfunction  8. Moderate mitral regurgitation  9. Hypothyroidism     She was last seen by Dr. Bonilla in 11/2019.  More recently, she has had concerns with dizziness.  She had an MRI with some small vessel disease.  She was seen by neurology yesterday who did not feel she had a stroke (this was her concern).  Her dizziness is positional with head turns and she will be having vestibular PT in the near future.  Edda reports her shortness of breath is at baseline.  She has had some HR variability with HR's up to 140 on her smart watch.  This has not been an issue in the pastt week or so.  She states her thyroid medication was adjusted recently.        PAST MEDICAL HISTORY:  No past medical history on file.    MEDICATIONS:  Current Outpatient Medications   Medication     albuterol (PROAIR HFA/PROVENTIL HFA/VENTOLIN HFA) 108 (90 Base) MCG/ACT inhaler     ascorbic acid 500 MG TABS     aspirin 81 MG tablet     Cholecalciferol (VITAMIN D) 2000 units tablet     ciprofloxacin (CIPRO) 500 MG tablet     clotrimazole (MYCELEX) 10 MG LOZG lozenge     doxycycline monohydrate (ADOXA) 100 MG tablet     fish oil-omega-3 fatty acids 1000 MG capsule     fluticasone (FLONASE) 50 MCG/ACT spray     furosemide (LASIX) 20 MG tablet     ipratropium - albuterol 0.5 mg/2.5 mg/3 mL (DUONEB) 0.5-2.5 (3) MG/3ML neb  solution     levothyroxine (SYNTHROID/LEVOTHROID) 75 MCG tablet     mometasone furoate (ASMANEX HFA) 200 MCG/ACT inhaler     Multiple vitamin TABS     nystatin (MYCOSTATIN) 315592 UNIT/ML suspension     ondansetron (ZOFRAN-ODT) 4 MG ODT tab     order for DME     oxyCODONE-acetaminophen (PERCOCET) 5-325 MG per tablet     polyvinyl alcohol (LIQUIFILM TEARS) 1.4 % ophthalmic solution     predniSONE (DELTASONE) 20 MG tablet     rosuvastatin (CRESTOR) 40 MG tablet     sodium chloride 3 % NEBU neb solution     spironolactone (ALDACTONE) 25 MG tablet     tiotropium (SPIRIVA RESPIMAT) 2.5 MCG/ACT inhalation aerosol     tretinoin (RETIN-A) 0.025 % cream     Turmeric 450 MG CAPS     valACYclovir (VALTREX) 1000 mg tablet     vitamin E 400 units TABS     No current facility-administered medications for this visit.        ALLERGIES:  Allergies   Allergen Reactions     Atorvastatin      Muscle aches     Hmg-Coa-R Inhibitors      Muscle aches     Pravastatin      Muscle aches     Scopolamine Nausea and Vomiting and Swelling       SOCIAL HISTORY:  I have reviewed this patient's social history and updated it with pertinent information if needed. Edda Mckeon  reports that she has never smoked. She has never used smokeless tobacco. She reports current alcohol use. She reports that she does not use drugs.    FAMILY HISTORY:  I have reviewed this patient's family history and updated it with pertinent information if needed.   No family history on file.    REVIEW OF SYSTEMS:  A complete ROS was obtained and the pertinent positives are outlined in the history of present illness above.  The remainder of systems is negative.      PHYSICAL EXAM:                     Vital Signs with Ranges  BP: ()/()   Arterial Line BP: ()/()   0 lbs 0 oz    Constitutional: awake, alert, no distress  Eyes: PERRL, sclera nonicteric  ENT: trachea midline  Respiratory: CTAB  Cardiovascular: RRR no m/r/g, no JVD  GI: nondistended, nontender, bowel sounds  present  Lymph/Hematologic: no lymphadenopathy  Skin: dry, no rash  Musculoskeletal: good muscle tone, no edema bilaterally  Neurologic: no focal deficits  Neuropsychiatric: appropriate affact      ASSESSMENT:  1.  Palpitations  2.  Dizziness; felt to be vestibular in origin  3.  LAILA:  Compliant with CPAP  4.  Obesity  5.  Hypertension:  At goal  6.  Mitral regurgitation:  Mild by most recent echocardiogram  7.  Hypothyroidism    RECOMMENDATIONS:  1.  Will obtain an echocardiogram to evaluate LV function and follow up MR.  Plan for 14 day zio patch monitor to further assess palpitations.  She was hyperthyroid on her most recent lab work and her synthroid as adjusted-this may have caused the palpitations which she states have improved over the past week.  2.  Will review results of the above studies.  Further recommendations to follow.     Ariadne Boss MD  Cardiology - [facility]  June 11, 2021    cc:   No referring provider defined for this encounter.

## 2021-06-14 ENCOUNTER — HOSPITAL ENCOUNTER (OUTPATIENT)
Dept: CARDIOLOGY | Facility: CLINIC | Age: 71
Discharge: HOME | End: 2021-06-14
Attending: INTERNAL MEDICINE | Admitting: INTERNAL MEDICINE
Payer: COMMERCIAL

## 2021-06-14 DIAGNOSIS — R00.2 PALPITATIONS: ICD-10-CM

## 2021-06-14 DIAGNOSIS — I38 VALVULAR HEART DISEASE: ICD-10-CM

## 2021-06-14 PROCEDURE — 93306 TTE W/DOPPLER COMPLETE: CPT

## 2021-06-14 PROCEDURE — 93306 TTE W/DOPPLER COMPLETE: CPT | Mod: 26 | Performed by: INTERNAL MEDICINE

## 2021-06-17 ENCOUNTER — TELEPHONE (OUTPATIENT)
Dept: CARDIOLOGY | Facility: CLINIC | Age: 71
End: 2021-06-17

## 2021-06-17 NOTE — TELEPHONE ENCOUNTER
[facility]    Phone Message    May a detailed message be left on voicemail: yes     Reason for Call: Requesting Results   Name/type of test: Echocardiogram Complete  Date of test: 6/14/2021  Was test done at a location other than [facility] (Please fill in the location if not [facility])?: No      Action Taken: Message routed to:  [facility] ([facility]    Travel Screening: Not Applicable

## 2021-06-18 NOTE — TELEPHONE ENCOUNTER
Contacted patient to review echocardiogram results. Reviewed echocardiogram results, and Dr. Boss's comments. Patient verbalized understanding and agreed with plan of care. No further questions. Patient requested that a copy of her echo results be sent to her home address. Will mail results.

## 2021-06-24 ENCOUNTER — HOSPITAL ENCOUNTER (OUTPATIENT)
Dept: CARDIOLOGY | Facility: CLINIC | Age: 71
Discharge: HOME | End: 2021-06-24
Attending: INTERNAL MEDICINE | Admitting: INTERNAL MEDICINE
Payer: COMMERCIAL

## 2021-06-24 DIAGNOSIS — R00.2 PALPITATIONS: ICD-10-CM

## 2021-06-24 PROCEDURE — 93246 EXT ECG>7D<15D RECORDING: CPT

## 2021-07-30 ENCOUNTER — TELEPHONE (OUTPATIENT)
Dept: CARDIOLOGY | Facility: CLINIC | Age: 71
End: 2021-07-30

## 2021-07-30 DIAGNOSIS — I47.10 PAROXYSMAL SUPRAVENTRICULAR TACHYCARDIA (H): Primary | ICD-10-CM

## 2021-07-30 RX ORDER — METOPROLOL SUCCINATE 25 MG/1
25 TABLET, EXTENDED RELEASE ORAL DAILY
Qty: 90 TABLET | Refills: 3 | Status: SHIPPED | OUTPATIENT
Start: 2021-07-30 | End: 2023-01-03

## 2021-07-30 NOTE — TELEPHONE ENCOUNTER
Ariadne Boss MD  [facility] 4  ZIO patch monitor reviewed.  Demonstrates frequent (but very brief runs of SVT) that correlated with her symptoms.  Recommend trial of metoprolol XL 25 mg daily for symptomatic relief.  Follow up in 3 months with MATEO to reassess symptoms on BB.   Ariadne Boss MD     Contacted patient to review results and Dr. Boss's comments. Patient is very hesitant at this point to start a beta blocker due to having a lot of other health issues right now. She will  medication from pharmacy, but wants to think about it before starting the medication. She also would like to make an appointment with Dr. Boss in the late fall to review. Patient also requesting a copy of her results be mailed to her along with any literature about SVT that we can send her. Message sent to scheduling to get follow up appointment scheduled with Dr. Boss.

## 2021-09-04 ENCOUNTER — HEALTH MAINTENANCE LETTER (OUTPATIENT)
Age: 71
End: 2021-09-04

## 2021-11-12 NOTE — TELEPHONE ENCOUNTER
"VM received from patient, stating that she recently had a stroke and she is seeing Dr. Boss on 6/11. Patient is requesting to get an echocardiogram done tomorrow, as she wants to make sure nothing is wrong with her heart that would've caused the stroke. Spoke with patient. Patient explained situation of recent events leading up to today. Patient states that she was advised to go to the ER last week, but was not seen in the ER - she left as the waiting room was too full. Reviewed with patient that Dr. Boss is out of office this week, and has not seen her as a patient, so our clinic will not be able to provide an echo order. Patient complained of further neurological symptoms, advised her to contact her PCP office, but patient declined and said \"it would take too long.\" Patient states that she has been trying to get a hold of Dr. Bonilla's nurse, but hasn't been successful. Will route to Dr. Bonilla's nurses to review.   "
Addressed in another encounter.   
Message already sent to DR. Bonilla for review. WIll call patient once he reviews and determines if echo needed.  
Tachycardic rate, irregular rhythm.  Heart sounds S1, S2.  No murmurs, rubs or gallops.

## 2021-12-25 ENCOUNTER — HEALTH MAINTENANCE LETTER (OUTPATIENT)
Age: 71
End: 2021-12-25

## 2022-02-19 ENCOUNTER — HEALTH MAINTENANCE LETTER (OUTPATIENT)
Age: 72
End: 2022-02-19

## 2022-09-14 ENCOUNTER — TRANSFERRED RECORDS (OUTPATIENT)
Dept: HEALTH INFORMATION MANAGEMENT | Facility: CLINIC | Age: 72
End: 2022-09-14

## 2022-09-18 ENCOUNTER — HOSPITAL ENCOUNTER (INPATIENT)
Facility: CLINIC | Age: 72
LOS: 2 days | Discharge: HOME | DRG: 247 | End: 2022-09-20
Attending: EMERGENCY MEDICINE | Admitting: INTERNAL MEDICINE
Payer: COMMERCIAL

## 2022-09-18 DIAGNOSIS — I21.4 NSTEMI (NON-ST ELEVATED MYOCARDIAL INFARCTION) (H): Primary | ICD-10-CM

## 2022-09-18 DIAGNOSIS — Z98.890 HISTORY OF KNEE SURGERY: ICD-10-CM

## 2022-09-18 DIAGNOSIS — I20.0 UNSTABLE ANGINA (H): ICD-10-CM

## 2022-09-18 LAB
ANION GAP SERPL CALCULATED.3IONS-SCNC: 6 MMOL/L (ref 3–14)
APTT PPP: 28 SECONDS (ref 22–38)
BASOPHILS # BLD AUTO: 0 10E3/UL (ref 0–0.2)
BASOPHILS NFR BLD AUTO: 0 %
BUN SERPL-MCNC: 16 MG/DL (ref 7–30)
CALCIUM SERPL-MCNC: 9.1 MG/DL (ref 8.5–10.1)
CHLORIDE BLD-SCNC: 106 MMOL/L (ref 94–109)
CO2 SERPL-SCNC: 28 MMOL/L (ref 20–32)
CREAT SERPL-MCNC: 0.85 MG/DL (ref 0.52–1.04)
EOSINOPHIL # BLD AUTO: 0.1 10E3/UL (ref 0–0.7)
EOSINOPHIL NFR BLD AUTO: 2 %
ERYTHROCYTE [DISTWIDTH] IN BLOOD BY AUTOMATED COUNT: 15.3 % (ref 10–15)
GFR SERPL CREATININE-BSD FRML MDRD: 72 ML/MIN/1.73M2
GLUCOSE BLD-MCNC: 137 MG/DL (ref 70–99)
HCT VFR BLD AUTO: 41.8 % (ref 35–47)
HGB BLD-MCNC: 13.3 G/DL (ref 11.7–15.7)
IMM GRANULOCYTES # BLD: 0 10E3/UL
IMM GRANULOCYTES NFR BLD: 0 %
INR PPP: 1.01 (ref 0.85–1.15)
LYMPHOCYTES # BLD AUTO: 2.4 10E3/UL (ref 0.8–5.3)
LYMPHOCYTES NFR BLD AUTO: 37 %
MCH RBC QN AUTO: 31.4 PG (ref 26.5–33)
MCHC RBC AUTO-ENTMCNC: 31.8 G/DL (ref 31.5–36.5)
MCV RBC AUTO: 99 FL (ref 78–100)
MONOCYTES # BLD AUTO: 0.7 10E3/UL (ref 0–1.3)
MONOCYTES NFR BLD AUTO: 11 %
NEUTROPHILS # BLD AUTO: 3.3 10E3/UL (ref 1.6–8.3)
NEUTROPHILS NFR BLD AUTO: 50 %
NRBC # BLD AUTO: 0 10E3/UL
NRBC BLD AUTO-RTO: 0 /100
NT-PROBNP SERPL-MCNC: 717 PG/ML (ref 0–900)
PLATELET # BLD AUTO: 244 10E3/UL (ref 150–450)
POTASSIUM BLD-SCNC: 4.2 MMOL/L (ref 3.4–5.3)
RBC # BLD AUTO: 4.24 10E6/UL (ref 3.8–5.2)
SARS-COV-2 RNA RESP QL NAA+PROBE: NEGATIVE
SODIUM SERPL-SCNC: 140 MMOL/L (ref 133–144)
TROPONIN I SERPL HS-MCNC: 21 NG/L
TROPONIN I SERPL HS-MCNC: 24 NG/L
TSH SERPL DL<=0.005 MIU/L-ACNC: 3.05 MU/L (ref 0.4–4)
WBC # BLD AUTO: 6.5 10E3/UL (ref 4–11)

## 2022-09-18 PROCEDURE — 36415 COLL VENOUS BLD VENIPUNCTURE: CPT | Performed by: INTERNAL MEDICINE

## 2022-09-18 PROCEDURE — 83880 ASSAY OF NATRIURETIC PEPTIDE: CPT | Performed by: EMERGENCY MEDICINE

## 2022-09-18 PROCEDURE — 99285 EMERGENCY DEPT VISIT HI MDM: CPT | Mod: 25

## 2022-09-18 PROCEDURE — 96376 TX/PRO/DX INJ SAME DRUG ADON: CPT

## 2022-09-18 PROCEDURE — 84484 ASSAY OF TROPONIN QUANT: CPT | Performed by: INTERNAL MEDICINE

## 2022-09-18 PROCEDURE — 99223 1ST HOSP IP/OBS HIGH 75: CPT | Mod: AI | Performed by: INTERNAL MEDICINE

## 2022-09-18 PROCEDURE — 85610 PROTHROMBIN TIME: CPT | Performed by: EMERGENCY MEDICINE

## 2022-09-18 PROCEDURE — 85025 COMPLETE CBC W/AUTO DIFF WBC: CPT | Performed by: EMERGENCY MEDICINE

## 2022-09-18 PROCEDURE — 96365 THER/PROPH/DIAG IV INF INIT: CPT

## 2022-09-18 PROCEDURE — 210N000002 HC R&B HEART CARE

## 2022-09-18 PROCEDURE — 250N000013 HC RX MED GY IP 250 OP 250 PS 637: Performed by: EMERGENCY MEDICINE

## 2022-09-18 PROCEDURE — 250N000011 HC RX IP 250 OP 636: Performed by: EMERGENCY MEDICINE

## 2022-09-18 PROCEDURE — 84443 ASSAY THYROID STIM HORMONE: CPT | Performed by: INTERNAL MEDICINE

## 2022-09-18 PROCEDURE — 85730 THROMBOPLASTIN TIME PARTIAL: CPT | Performed by: EMERGENCY MEDICINE

## 2022-09-18 PROCEDURE — 84145 PROCALCITONIN (PCT): CPT | Performed by: INTERNAL MEDICINE

## 2022-09-18 PROCEDURE — 36415 COLL VENOUS BLD VENIPUNCTURE: CPT | Performed by: EMERGENCY MEDICINE

## 2022-09-18 PROCEDURE — C9803 HOPD COVID-19 SPEC COLLECT: HCPCS

## 2022-09-18 PROCEDURE — U0003 INFECTIOUS AGENT DETECTION BY NUCLEIC ACID (DNA OR RNA); SEVERE ACUTE RESPIRATORY SYNDROME CORONAVIRUS 2 (SARS-COV-2) (CORONAVIRUS DISEASE [COVID-19]), AMPLIFIED PROBE TECHNIQUE, MAKING USE OF HIGH THROUGHPUT TECHNOLOGIES AS DESCRIBED BY CMS-2020-01-R: HCPCS | Performed by: EMERGENCY MEDICINE

## 2022-09-18 PROCEDURE — 84484 ASSAY OF TROPONIN QUANT: CPT | Performed by: EMERGENCY MEDICINE

## 2022-09-18 PROCEDURE — 250N000013 HC RX MED GY IP 250 OP 250 PS 637: Performed by: INTERNAL MEDICINE

## 2022-09-18 PROCEDURE — 80048 BASIC METABOLIC PNL TOTAL CA: CPT | Performed by: EMERGENCY MEDICINE

## 2022-09-18 PROCEDURE — 93005 ELECTROCARDIOGRAM TRACING: CPT

## 2022-09-18 RX ORDER — ROSUVASTATIN CALCIUM 20 MG/1
40 TABLET, COATED ORAL EVERY EVENING
Status: DISCONTINUED | OUTPATIENT
Start: 2022-09-18 | End: 2022-09-20 | Stop reason: HOSPADM

## 2022-09-18 RX ORDER — PROCHLORPERAZINE 25 MG
12.5 SUPPOSITORY, RECTAL RECTAL EVERY 12 HOURS PRN
Status: DISCONTINUED | OUTPATIENT
Start: 2022-09-18 | End: 2022-09-20 | Stop reason: HOSPADM

## 2022-09-18 RX ORDER — LIDOCAINE 40 MG/G
CREAM TOPICAL
Status: CANCELLED | OUTPATIENT
Start: 2022-09-18

## 2022-09-18 RX ORDER — NITROGLYCERIN 0.4 MG/1
0.4 TABLET SUBLINGUAL EVERY 5 MIN PRN
Status: DISCONTINUED | OUTPATIENT
Start: 2022-09-18 | End: 2022-09-20 | Stop reason: HOSPADM

## 2022-09-18 RX ORDER — HEPARIN SODIUM 10000 [USP'U]/100ML
0-5000 INJECTION, SOLUTION INTRAVENOUS CONTINUOUS
Status: DISCONTINUED | OUTPATIENT
Start: 2022-09-18 | End: 2022-09-18

## 2022-09-18 RX ORDER — ASPIRIN 81 MG/1
81 TABLET, CHEWABLE ORAL DAILY
Status: DISCONTINUED | OUTPATIENT
Start: 2022-09-19 | End: 2022-09-19

## 2022-09-18 RX ORDER — CEFTRIAXONE 1 G/1
1 INJECTION, POWDER, FOR SOLUTION INTRAMUSCULAR; INTRAVENOUS EVERY 24 HOURS
Status: DISCONTINUED | OUTPATIENT
Start: 2022-09-18 | End: 2022-09-20 | Stop reason: HOSPADM

## 2022-09-18 RX ORDER — LEVOTHYROXINE SODIUM 75 UG/1
75 TABLET ORAL
Status: DISCONTINUED | OUTPATIENT
Start: 2022-09-19 | End: 2022-09-20 | Stop reason: HOSPADM

## 2022-09-18 RX ORDER — BISACODYL 10 MG
10 SUPPOSITORY, RECTAL RECTAL DAILY PRN
Status: DISCONTINUED | OUTPATIENT
Start: 2022-09-18 | End: 2022-09-20 | Stop reason: HOSPADM

## 2022-09-18 RX ORDER — UBIDECARENONE 50 MG
50 CAPSULE ORAL DAILY
COMMUNITY

## 2022-09-18 RX ORDER — AMOXICILLIN 250 MG
2 CAPSULE ORAL 2 TIMES DAILY PRN
Status: DISCONTINUED | OUTPATIENT
Start: 2022-09-18 | End: 2022-09-20 | Stop reason: HOSPADM

## 2022-09-18 RX ORDER — ACETAMINOPHEN 650 MG/1
650 SUPPOSITORY RECTAL EVERY 6 HOURS PRN
Status: DISCONTINUED | OUTPATIENT
Start: 2022-09-18 | End: 2022-09-20 | Stop reason: HOSPADM

## 2022-09-18 RX ORDER — ONDANSETRON 2 MG/ML
4 INJECTION INTRAMUSCULAR; INTRAVENOUS EVERY 6 HOURS PRN
Status: DISCONTINUED | OUTPATIENT
Start: 2022-09-18 | End: 2022-09-20 | Stop reason: HOSPADM

## 2022-09-18 RX ORDER — PROCHLORPERAZINE MALEATE 5 MG
5 TABLET ORAL EVERY 6 HOURS PRN
Status: DISCONTINUED | OUTPATIENT
Start: 2022-09-18 | End: 2022-09-20 | Stop reason: HOSPADM

## 2022-09-18 RX ORDER — ONDANSETRON 4 MG/1
4 TABLET, ORALLY DISINTEGRATING ORAL EVERY 6 HOURS PRN
Status: DISCONTINUED | OUTPATIENT
Start: 2022-09-18 | End: 2022-09-20 | Stop reason: HOSPADM

## 2022-09-18 RX ORDER — ASPIRIN 81 MG/1
324 TABLET, CHEWABLE ORAL ONCE
Status: COMPLETED | OUTPATIENT
Start: 2022-09-18 | End: 2022-09-18

## 2022-09-18 RX ORDER — ACETAMINOPHEN 325 MG/1
650 TABLET ORAL EVERY 6 HOURS PRN
Status: DISCONTINUED | OUTPATIENT
Start: 2022-09-18 | End: 2022-09-20 | Stop reason: HOSPADM

## 2022-09-18 RX ORDER — METOPROLOL SUCCINATE 25 MG/1
25 TABLET, EXTENDED RELEASE ORAL DAILY
Status: DISCONTINUED | OUTPATIENT
Start: 2022-09-18 | End: 2022-09-20 | Stop reason: HOSPADM

## 2022-09-18 RX ORDER — AMOXICILLIN 250 MG
1 CAPSULE ORAL 2 TIMES DAILY PRN
Status: DISCONTINUED | OUTPATIENT
Start: 2022-09-18 | End: 2022-09-20 | Stop reason: HOSPADM

## 2022-09-18 RX ORDER — LIDOCAINE 40 MG/G
CREAM TOPICAL
Status: DISCONTINUED | OUTPATIENT
Start: 2022-09-18 | End: 2022-09-20 | Stop reason: HOSPADM

## 2022-09-18 RX ORDER — HEPARIN SODIUM 10000 [USP'U]/100ML
0-5000 INJECTION, SOLUTION INTRAVENOUS CONTINUOUS
Status: DISCONTINUED | OUTPATIENT
Start: 2022-09-18 | End: 2022-09-19

## 2022-09-18 RX ORDER — IPRATROPIUM BROMIDE AND ALBUTEROL SULFATE 2.5; .5 MG/3ML; MG/3ML
3 SOLUTION RESPIRATORY (INHALATION) EVERY 4 HOURS
Status: DISCONTINUED | OUTPATIENT
Start: 2022-09-18 | End: 2022-09-20 | Stop reason: HOSPADM

## 2022-09-18 RX ORDER — ASPIRIN 81 MG/1
324 TABLET, CHEWABLE ORAL ONCE
Status: DISCONTINUED | OUTPATIENT
Start: 2022-09-18 | End: 2022-09-18

## 2022-09-18 RX ADMIN — HEPARIN SODIUM 900 UNITS/HR: 10000 INJECTION, SOLUTION INTRAVENOUS at 21:46

## 2022-09-18 RX ADMIN — ROSUVASTATIN CALCIUM 40 MG: 20 TABLET, FILM COATED ORAL at 23:09

## 2022-09-18 RX ADMIN — METOPROLOL SUCCINATE 25 MG: 25 TABLET, EXTENDED RELEASE ORAL at 23:09

## 2022-09-18 RX ADMIN — ASPIRIN 81 MG CHEWABLE TABLET 324 MG: 81 TABLET CHEWABLE at 19:15

## 2022-09-18 RX ADMIN — NITROGLYCERIN 0.4 MG: 0.4 TABLET SUBLINGUAL at 19:17

## 2022-09-18 ASSESSMENT — ENCOUNTER SYMPTOMS
FEVER: 0
ABDOMINAL PAIN: 0
SHORTNESS OF BREATH: 1
CHILLS: 0
COUGH: 0
NAUSEA: 0
CHEST TIGHTNESS: 1
HEADACHES: 0

## 2022-09-18 ASSESSMENT — ACTIVITIES OF DAILY LIVING (ADL)
ADLS_ACUITY_SCORE: 35

## 2022-09-18 NOTE — Clinical Note
Stent deployed in the left anterior descending. Max pressure = 11 mario. Total duration = 16 seconds.

## 2022-09-18 NOTE — Clinical Note
Informed Pt. Verbal understanding  Appointment scheduled   Max pressure = 12 mario. Total duration = 17 seconds.

## 2022-09-18 NOTE — Clinical Note
The first balloon was inserted into the right coronary artery.Max pressure = 14 mario. Total duration = 15 seconds.     Max pressure = 14 mario. Total duration = 8 seconds.    Balloon reinflated a second time: Max pressure = 14 mario. Total duration = 8 seconds.  Balloon reinflated a third time:

## 2022-09-18 NOTE — Clinical Note
The first balloon was inserted into the left anterior descending.Max pressure = 4 mario. Total duration = 15 seconds.

## 2022-09-18 NOTE — Clinical Note
The first balloon was inserted into the right coronary artery.Max pressure = 18 mario. Total duration = 60 seconds.     Max pressure = 18 mario. Total duration = 28 seconds.    Balloon reinflated a second time: Max pressure = 18 mario. Total duration = 28 seconds.  Balloon reinflated a third time: Max pressure = 18 mario. Total duration = 28 seconds.

## 2022-09-18 NOTE — Clinical Note
The first balloon was inserted into the left anterior descending.Max pressure = 10 mario. Total duration = 12 seconds.

## 2022-09-18 NOTE — ED TRIAGE NOTES
She was called by [facility] staff, from her visit yesterday, that based off of her 2nd troponin she should go back to the hospital r/t it was elevated.  She did not want another EKG in triage, but states maybe she'll accept it in the back.      Triage Assessment     Row Name 09/18/22 [id number]       Triage Assessment (Adult)    Airway WDL WDL       Respiratory WDL    Respiratory WDL WDL       Skin Circulation/Temperature WDL    Skin Circulation/Temperature WDL WDL       Cardiac WDL    Cardiac WDL X  Chest discomfort       Peripheral/Neurovascular WDL    Peripheral Neurovascular WDL WDL       Cognitive/Neuro/Behavioral WDL    Cognitive/Neuro/Behavioral WDL WDL

## 2022-09-18 NOTE — ED PROVIDER NOTES
History     Chief Complaint:  Abnormal Labs and Chest Pain       HPI   Edda Mckeon is a 72 year old female who presents with chest pain that she developed yesterday.  It is a heaviness in the left side of her chest, did radiate into her left arm.  She went to an urgent care and they did an EKG and it sounds like that was not normal and they were concerned about her and recommended she go to the ER.  She went to [facility] to the emergency room where she was evaluated and noted to have bilateral leg edema, ultrasound of her legs was negative.  Because of the complaint of chest pain and shortness of breath labs were obtained and her initial troponin was normal at less than 0.01 but her D-dimer was mildly elevated at 0.95.  CT of the chest was unremarkable and no PE.  Her BNP was mildly elevated at 220.  They did give her Lasix because of her leg edema and she said she has gotten rid of a lot of the fluid over the last 24 hours.  Apparently the emergency room was very busy and they had a second troponin drawn but never had anyone come in to talk to them about the result and they ended up leaving around 1030 last night.  She proceeds to have a little bit of heaviness still in the left side of the chest.  She is mildly short of breath with it.  They called her today stating that her second troponin was elevated at 0.078 and recommended she go to the emergency room with concerns that she may have had a small heart attack.  She comes here for further evaluation at this time.  Her pain is only a 1 or 2 out of 10 at this time.  Her pain seems to be worse at night and not necessarily worse going up and down stairs which she said she has been struggling with increasing weakness in her legs for some time.    Cardiac risk factors: Her father but was older.  She is a borderline diabetic and has high cholesterol.    ROS:  Review of Systems   Constitutional: Negative for chills and fever.   Respiratory: Positive for chest  tightness and shortness of breath. Negative for cough.    Cardiovascular: Positive for leg swelling.   Gastrointestinal: Negative for abdominal pain and nausea.   Neurological: Negative for headaches.   All other systems reviewed and are negative.       Allergies:  Atorvastatin  Hmg-Coa-R Inhibitors  Pravastatin  Scopolamine     Medications:    albuterol (PROAIR HFA/PROVENTIL HFA/VENTOLIN HFA) 108 (90 Base) MCG/ACT inhaler  aspirin 81 MG tablet  Cholecalciferol (VITAMIN D) 2000 units tablet  coenzyme Q-10 (CO-Q10) 50 MG capsule  fish oil-omega-3 fatty acids 1000 MG capsule  Gluc-Chonn-MSM-Boswellia-Vit D (GLUCOS CHONDROIT, BOSWELLIA,) TABS  levothyroxine (SYNTHROID/LEVOTHROID) 75 MCG tablet  metoprolol succinate ER (TOPROL-XL) 25 MG 24 hr tablet  mometasone furoate (ASMANEX HFA) 200 MCG/ACT inhaler  Multiple vitamin TABS  PAPAYA ENZYME PO  polyvinyl alcohol (LIQUIFILM TEARS) 1.4 % ophthalmic solution  rosuvastatin (CRESTOR) 40 MG tablet  Turmeric 450 MG CAPS  order for DME        Past Medical History:    Past Medical History:   Diagnosis Date     Palpitations      Shortness of breath        Past Surgical History:    History reviewed. No pertinent surgical history.     Family History:    family history is not on file.    Social History:   reports that she has never smoked. She has never used smokeless tobacco. She reports current alcohol use. She reports that she does not use drugs.  PCP: Emi Monk     Physical Exam     Patient Vitals for the past 24 hrs:   BP Temp Temp src Pulse Resp SpO2 Weight   09/18/22 2100 124/79 -- -- 66 (!) 32 -- --   09/18/22 2000 126/72 -- -- 70 24 93 % --   09/18/22 1930 129/81 -- -- 76 16 93 % --   09/18/22 1915 (!) 147/90 -- -- 73 13 93 % --   09/18/22 1900 (!) 134/95 -- -- 70 20 94 % --   09/18/22 1845 (!) 144/92 -- -- 74 15 95 % --   09/18/22 1721 (!) 156/80 99  F (37.2  C) Temporal 109 18 93 % 74.8 kg (165 lb)        Physical Exam  Nursing note and vitals  reviewed.    Constitutional:  Appears comfortable.    HENT:    Nose normal.  No discharge.      Oral mucosa is moist.  Eyes:    Conjunctivae are normal without injection.  Pupils are equal.  Cardiovascular:  Normal rate, regular rhythm with normal S1 and S2.      Normal heart sounds and peripheral pulses 2+ and equal.       No murmur or josé manuel.  Pulmonary:  Effort normal and breath sounds clear to auscultation bilaterally.     No respiratory distress.  No stridor.     No wheezes. No rales.     GI:    Soft. No distension and no mass. No tenderness.      No rebound and no guarding.     Musculoskeletal:  Normal range of motion. No extremity deformity.     Trace lower extremity edema.    Neurological:   Alert and oriented. No focal weakness.  Skin:    Skin is warm and dry. No rash noted.   Psychiatric:   Behavior is normal. Appropriate mood and affect.     Judgment and thought content normal.         Emergency Department Course   ECG:  ECG results from 09/18/22   EKG 12-lead, tracing only     Value    Systolic Blood Pressure     Diastolic Blood Pressure     Ventricular Rate 73    Atrial Rate 73    TX Interval 164    QRS Duration 72        QTc 438    P Axis 77    R AXIS 48    T Axis 56    Interpretation ECG      Sinus rhythm  T wave abnormality, consider anterior ischemia  Abnormal ECG  When compared with ECG of 06-JUL-2011 17:14,  T wave inversion now evident in Anterior leads         Imaging:  CT from [place] 9-17-22  Impression    1.  No pulmonary emboli identified.   2.  No etiology for symptoms evident.        Laboratory:  Labs Ordered and Resulted from Time of ED Arrival to Time of ED Departure   BASIC METABOLIC PANEL - Abnormal       Result Value    Sodium 140      Potassium 4.2      Chloride 106      Carbon Dioxide (CO2) 28      Anion Gap 6      Urea Nitrogen 16      Creatinine 0.85      Calcium 9.1      Glucose 137 (*)     GFR Estimate 72     CBC WITH PLATELETS AND DIFFERENTIAL - Abnormal    WBC Count 6.5       RBC Count 4.24      Hemoglobin 13.3      Hematocrit 41.8      MCV 99      MCH 31.4      MCHC 31.8      RDW 15.3 (*)     Platelet Count 244      % Neutrophils 50      % Lymphocytes 37      % Monocytes 11      % Eosinophils 2      % Basophils 0      % Immature Granulocytes 0      NRBCs per 100 WBC 0      Absolute Neutrophils 3.3      Absolute Lymphocytes 2.4      Absolute Monocytes 0.7      Absolute Eosinophils 0.1      Absolute Basophils 0.0      Absolute Immature Granulocytes 0.0      Absolute NRBCs 0.0     TROPONIN I - Normal    Troponin I High Sensitivity 21     INR - Normal    INR 1.01     PARTIAL THROMBOPLASTIN TIME - Normal    aPTT 28     NT PROBNP INPATIENT - Normal    N terminal Pro BNP Inpatient 717     COVID-19 VIRUS (CORONAVIRUS) BY PCR - Normal    SARS CoV2 PCR Negative            Emergency Department Course:      Reviewed:  I reviewed nursing notes, vitals and past medical history    Assessments:  1815 I obtained history and examined the patient as noted above.       Consults:   Сергей Haile hospitalist    Interventions:  Medications   nitroGLYcerin (NITROSTAT) sublingual tablet 0.4 mg (0.4 mg Sublingual Given 9/18/22 1917)   heparin loading dose for LOW INTENSITY TREATMENT * Give BEFORE starting heparin infusion (has no administration in time range)   heparin 25,000 units in 0.45% NaCl 250 mL ANTICOAGULANT infusion (has no administration in time range)   aspirin (ASA) chewable tablet 324 mg (324 mg Oral Given 9/18/22 1915)        Disposition:  The patient was admitted to the hospital under the care of Dr. Haile.     Impression & Plan        Medical Decision Making:  Patient comes in with chest pain since yesterday.  An EKG showed inverted T waves across the precordium through V1 to V4, her troponin bumped yesterday from 0.01-0.078.  I ordered labs and a nitro because she still had some mild chest heaviness which the nitro relieved.  She was given 4 baby aspirin.  Her labs came back with a  normal CBC, glucose was up at 137, basic panel normal BNP normal but her troponin was 21 so not completely normal but also not elevated.  With the patient's bump in her troponin at [facility] yesterday and the fact that she has some risk factors now with inverted T waves, I think she has unstable angina.  I discussed the case with Dr. Haile and I recommend heparin and this will be started and she will be admitted for cardiology consultation tomorrow with serial troponins.  Nitro can be given as needed.  Her last echo was in 2021.  She will likely need some type of stress test or possibly if she rules and will need angiogram.  She had a CT scan of her chest yesterday which was normal and I did not feel that repeat chest x-ray was indicated.      Diagnosis:    ICD-10-CM    1. Unstable angina (H)  I20.0         Discharge Medications:  New Prescriptions    No medications on file        9/18/2022   Estefani Keith MD Powell, Tracy Alan, MD  09/18/22 [id number]

## 2022-09-18 NOTE — Clinical Note
Max pressure = 14 mario. Total duration = 28 seconds.     Max pressure = 18 mario. Total duration = 78 seconds.    Balloon reinflated a second time: Max pressure = 18 mario. Total duration = 78 seconds.

## 2022-09-18 NOTE — PHARMACY-ADMISSION MEDICATION HISTORY
Pharmacy Medication History  Admission medication history interview status for the 9/18/2022  admission is complete. See EPIC admission navigator for prior to admission medications     Location of Interview: Patient room  Medication history sources: Patient and Surescripts    Medication reconciliation completed by provider prior to medication history? No    Time spent in this activity: 25 minutes    Prior to Admission medications    Medication Sig Last Dose Taking? Auth Provider Long Term End Date   albuterol (PROAIR HFA/PROVENTIL HFA/VENTOLIN HFA) 108 (90 Base) MCG/ACT inhaler Inhale 1-2 puffs into the lungs every 6 hours as needed for shortness of breath / dyspnea or wheezing Past Month at PRN Yes Reported, Patient Yes    aspirin 81 MG tablet Take 81 mg by mouth daily 9/17/2022 at Unknown time Yes Reported, Patient     Cholecalciferol (VITAMIN D) 2000 units tablet Take 2,000 Units by mouth daily 9/17/2022 at Unknown time Yes Reported, Patient     coenzyme Q-10 (CO-Q10) 50 MG capsule Take 50 mg by mouth daily 9/16/2022 Yes Unknown, Entered By History     fish oil-omega-3 fatty acids 1000 MG capsule Take 2 g by mouth every other day 9/16/2022 Yes Reported, Patient     Gluc-Chonn-MSM-Boswellia-Vit D (GLUCOS CHONDROIT, BOSWELLIA,) TABS Take by mouth daily  Yes Unknown, Entered By History     levothyroxine (SYNTHROID/LEVOTHROID) 75 MCG tablet Take 75 mcg by mouth daily 9/18/2022 at AM Yes Reported, Patient Yes    metoprolol succinate ER (TOPROL-XL) 25 MG 24 hr tablet Take 1 tablet (25 mg) by mouth daily 9/16/2022 Yes Ariadne Boss MD Yes    mometasone furoate (ASMANEX HFA) 200 MCG/ACT inhaler Inhale 2 puffs into the lungs 2 times daily 9/16/2022 Yes Reported, Patient Yes 9/18/22   Multiple vitamin TABS Take 1 tablet by mouth daily 9/17/2022 at Unknown time Yes Reported, Patient     PAPAYA ENZYME PO Take by mouth daily 9/16/2022 Yes Unknown, Entered By History     polyvinyl alcohol (LIQUIFILM TEARS) 1.4 %  ophthalmic solution Place 2 drops into both eyes as needed for dry eyes Past Week at PRN Yes Reported, Patient     rosuvastatin (CRESTOR) 40 MG tablet Take 40 mg by mouth daily 9/17/2022 at PM Yes Reported, Patient Yes    Turmeric 450 MG CAPS Take 1 capsule by mouth 2 times daily 9/16/2022 Yes Reported, Patient     order for DME Oxygen for home use. 2 liters per NC during daytime, 4 at night. Frequency: Continuous with portability.; Length of need: 99  Months.   Reported, Patient         The information provided in this note is only as accurate as the sources available at the time of update(s)

## 2022-09-18 NOTE — Clinical Note
The first balloon was inserted into the left anterior descending.Max pressure = 6 mario. Total duration = 12 seconds.     Max pressure = 14 mario. Total duration = 14 seconds.    Balloon reinflated a second time: Max pressure = 14 mario. Total duration = 14 seconds.

## 2022-09-19 ENCOUNTER — APPOINTMENT (OUTPATIENT)
Dept: CARDIOLOGY | Facility: CLINIC | Age: 72
DRG: 247 | End: 2022-09-19
Attending: INTERNAL MEDICINE
Payer: COMMERCIAL

## 2022-09-19 LAB
ACT BLD: 254 SECONDS (ref 74–150)
ACT BLD: 263 SECONDS (ref 74–150)
ACT BLD: 339 SECONDS (ref 74–150)
ACT BLD: 348 SECONDS (ref 74–150)
ACT BLD: 352 SECONDS (ref 74–150)
ACT BLD: 497 SECONDS (ref 74–150)
ALBUMIN SERPL-MCNC: 2.6 G/DL (ref 3.4–5)
ALBUMIN UR-MCNC: NEGATIVE MG/DL
ALP SERPL-CCNC: 84 U/L (ref 40–150)
ALT SERPL W P-5'-P-CCNC: 16 U/L (ref 0–50)
ANION GAP SERPL CALCULATED.3IONS-SCNC: 8 MMOL/L (ref 3–14)
APPEARANCE UR: CLEAR
AST SERPL W P-5'-P-CCNC: 18 U/L (ref 0–45)
ATRIAL RATE - MUSE: 73 BPM
BILIRUB DIRECT SERPL-MCNC: <0.1 MG/DL (ref 0–0.2)
BILIRUB SERPL-MCNC: 0.3 MG/DL (ref 0.2–1.3)
BILIRUB UR QL STRIP: NEGATIVE
BUN SERPL-MCNC: 16 MG/DL (ref 7–30)
CALCIUM SERPL-MCNC: 8.7 MG/DL (ref 8.5–10.1)
CHLORIDE BLD-SCNC: 107 MMOL/L (ref 94–109)
CHOLEST SERPL-MCNC: 159 MG/DL
CO2 SERPL-SCNC: 28 MMOL/L (ref 20–32)
COLOR UR AUTO: ABNORMAL
CREAT SERPL-MCNC: 0.72 MG/DL (ref 0.52–1.04)
DIASTOLIC BLOOD PRESSURE - MUSE: NORMAL MMHG
ERYTHROCYTE [DISTWIDTH] IN BLOOD BY AUTOMATED COUNT: 15 % (ref 10–15)
GFR SERPL CREATININE-BSD FRML MDRD: 88 ML/MIN/1.73M2
GLUCOSE BLD-MCNC: 107 MG/DL (ref 70–99)
GLUCOSE UR STRIP-MCNC: NEGATIVE MG/DL
HCT VFR BLD AUTO: 38.8 % (ref 35–47)
HDLC SERPL-MCNC: 76 MG/DL
HGB BLD-MCNC: 12.3 G/DL (ref 11.7–15.7)
HGB UR QL STRIP: ABNORMAL
HYALINE CASTS: 1 /LPF
INTERPRETATION ECG - MUSE: NORMAL
KETONES UR STRIP-MCNC: NEGATIVE MG/DL
LDLC SERPL CALC-MCNC: 71 MG/DL
LEUKOCYTE ESTERASE UR QL STRIP: ABNORMAL
LVEF ECHO: NORMAL
MAGNESIUM SERPL-MCNC: 2.2 MG/DL (ref 1.6–2.3)
MCH RBC QN AUTO: 31.9 PG (ref 26.5–33)
MCHC RBC AUTO-ENTMCNC: 31.7 G/DL (ref 31.5–36.5)
MCV RBC AUTO: 101 FL (ref 78–100)
MUCOUS THREADS #/AREA URNS LPF: PRESENT /LPF
NITRATE UR QL: NEGATIVE
NONHDLC SERPL-MCNC: 83 MG/DL
P AXIS - MUSE: 77 DEGREES
PH UR STRIP: 5 [PH] (ref 5–7)
PLATELET # BLD AUTO: 211 10E3/UL (ref 150–450)
POTASSIUM BLD-SCNC: 3.5 MMOL/L (ref 3.4–5.3)
PR INTERVAL - MUSE: 164 MS
PROCALCITONIN SERPL-MCNC: <0.05 NG/ML
PROT SERPL-MCNC: 6.3 G/DL (ref 6.8–8.8)
QRS DURATION - MUSE: 72 MS
QT - MUSE: 398 MS
QTC - MUSE: 438 MS
R AXIS - MUSE: 48 DEGREES
RBC # BLD AUTO: 3.86 10E6/UL (ref 3.8–5.2)
RBC URINE: 17 /HPF
SODIUM SERPL-SCNC: 143 MMOL/L (ref 133–144)
SP GR UR STRIP: 1.01 (ref 1–1.03)
SYSTOLIC BLOOD PRESSURE - MUSE: NORMAL MMHG
T AXIS - MUSE: 56 DEGREES
TRANSITIONAL EPI: <1 /HPF
TRIGL SERPL-MCNC: 58 MG/DL
TROPONIN I SERPL HS-MCNC: 16 NG/L
TROPONIN I SERPL HS-MCNC: 25 NG/L
UFH PPP CHRO-ACNC: 0.52 IU/ML
UFH PPP CHRO-ACNC: <0.1 IU/ML
UROBILINOGEN UR STRIP-MCNC: NORMAL MG/DL
VENTRICULAR RATE- MUSE: 73 BPM
WBC # BLD AUTO: 5.9 10E3/UL (ref 4–11)
WBC URINE: 15 /HPF

## 2022-09-19 PROCEDURE — 92928 PRQ TCAT PLMT NTRAC ST 1 LES: CPT | Mod: LD | Performed by: INTERNAL MEDICINE

## 2022-09-19 PROCEDURE — 36415 COLL VENOUS BLD VENIPUNCTURE: CPT | Performed by: INTERNAL MEDICINE

## 2022-09-19 PROCEDURE — 250N000013 HC RX MED GY IP 250 OP 250 PS 637: Performed by: INTERNAL MEDICINE

## 2022-09-19 PROCEDURE — 83735 ASSAY OF MAGNESIUM: CPT | Performed by: INTERNAL MEDICINE

## 2022-09-19 PROCEDURE — B2111ZZ FLUOROSCOPY OF MULTIPLE CORONARY ARTERIES USING LOW OSMOLAR CONTRAST: ICD-10-PCS | Performed by: INTERNAL MEDICINE

## 2022-09-19 PROCEDURE — 250N000011 HC RX IP 250 OP 636: Performed by: INTERNAL MEDICINE

## 2022-09-19 PROCEDURE — 92978 ENDOLUMINL IVUS OCT C 1ST: CPT | Mod: 26 | Performed by: INTERNAL MEDICINE

## 2022-09-19 PROCEDURE — 027135Z DILATION OF CORONARY ARTERY, TWO ARTERIES WITH TWO DRUG-ELUTING INTRALUMINAL DEVICES, PERCUTANEOUS APPROACH: ICD-10-PCS | Performed by: INTERNAL MEDICINE

## 2022-09-19 PROCEDURE — 99233 SBSQ HOSP IP/OBS HIGH 50: CPT | Performed by: INTERNAL MEDICINE

## 2022-09-19 PROCEDURE — 92978 ENDOLUMINL IVUS OCT C 1ST: CPT | Performed by: INTERNAL MEDICINE

## 2022-09-19 PROCEDURE — 82310 ASSAY OF CALCIUM: CPT | Performed by: INTERNAL MEDICINE

## 2022-09-19 PROCEDURE — 210N000002 HC R&B HEART CARE

## 2022-09-19 PROCEDURE — 80061 LIPID PANEL: CPT | Performed by: INTERNAL MEDICINE

## 2022-09-19 PROCEDURE — 999N000208 ECHOCARDIOGRAM COMPLETE

## 2022-09-19 PROCEDURE — 87086 URINE CULTURE/COLONY COUNT: CPT | Performed by: INTERNAL MEDICINE

## 2022-09-19 PROCEDURE — C1769 GUIDE WIRE: HCPCS | Performed by: INTERNAL MEDICINE

## 2022-09-19 PROCEDURE — 84484 ASSAY OF TROPONIN QUANT: CPT | Performed by: INTERNAL MEDICINE

## 2022-09-19 PROCEDURE — 99223 1ST HOSP IP/OBS HIGH 75: CPT | Performed by: INTERNAL MEDICINE

## 2022-09-19 PROCEDURE — 99152 MOD SED SAME PHYS/QHP 5/>YRS: CPT | Performed by: INTERNAL MEDICINE

## 2022-09-19 PROCEDURE — 94640 AIRWAY INHALATION TREATMENT: CPT

## 2022-09-19 PROCEDURE — 250N000009 HC RX 250: Performed by: INTERNAL MEDICINE

## 2022-09-19 PROCEDURE — C1887 CATHETER, GUIDING: HCPCS | Performed by: INTERNAL MEDICINE

## 2022-09-19 PROCEDURE — 272N000001 HC OR GENERAL SUPPLY STERILE: Performed by: INTERNAL MEDICINE

## 2022-09-19 PROCEDURE — 85027 COMPLETE CBC AUTOMATED: CPT | Performed by: INTERNAL MEDICINE

## 2022-09-19 PROCEDURE — C1725 CATH, TRANSLUMIN NON-LASER: HCPCS | Performed by: INTERNAL MEDICINE

## 2022-09-19 PROCEDURE — 258N000003 HC RX IP 258 OP 636: Performed by: INTERNAL MEDICINE

## 2022-09-19 PROCEDURE — C1874 STENT, COATED/COV W/DEL SYS: HCPCS | Performed by: INTERNAL MEDICINE

## 2022-09-19 PROCEDURE — 93454 CORONARY ARTERY ANGIO S&I: CPT | Mod: 26 | Performed by: INTERNAL MEDICINE

## 2022-09-19 PROCEDURE — 99153 MOD SED SAME PHYS/QHP EA: CPT | Performed by: INTERNAL MEDICINE

## 2022-09-19 PROCEDURE — C1894 INTRO/SHEATH, NON-LASER: HCPCS | Performed by: INTERNAL MEDICINE

## 2022-09-19 PROCEDURE — 81001 URINALYSIS AUTO W/SCOPE: CPT | Performed by: INTERNAL MEDICINE

## 2022-09-19 PROCEDURE — 85520 HEPARIN ASSAY: CPT | Performed by: INTERNAL MEDICINE

## 2022-09-19 PROCEDURE — 5A09357 ASSISTANCE WITH RESPIRATORY VENTILATION, LESS THAN 24 CONSECUTIVE HOURS, CONTINUOUS POSITIVE AIRWAY PRESSURE: ICD-10-PCS | Performed by: INTERNAL MEDICINE

## 2022-09-19 PROCEDURE — C9600 PERC DRUG-EL COR STENT SING: HCPCS | Performed by: INTERNAL MEDICINE

## 2022-09-19 PROCEDURE — 93005 ELECTROCARDIOGRAM TRACING: CPT

## 2022-09-19 PROCEDURE — 999N000157 HC STATISTIC RCP TIME EA 10 MIN

## 2022-09-19 PROCEDURE — C1753 CATH, INTRAVAS ULTRASOUND: HCPCS | Performed by: INTERNAL MEDICINE

## 2022-09-19 PROCEDURE — 255N000002 HC RX 255 OP 636: Performed by: INTERNAL MEDICINE

## 2022-09-19 PROCEDURE — 93306 TTE W/DOPPLER COMPLETE: CPT | Mod: 26 | Performed by: INTERNAL MEDICINE

## 2022-09-19 PROCEDURE — 93010 ELECTROCARDIOGRAM REPORT: CPT | Mod: 76 | Performed by: INTERNAL MEDICINE

## 2022-09-19 PROCEDURE — 85347 COAGULATION TIME ACTIVATED: CPT

## 2022-09-19 PROCEDURE — 999N000127 HC STATISTIC PERIPHERAL IV START W US GUIDANCE

## 2022-09-19 PROCEDURE — 93454 CORONARY ARTERY ANGIO S&I: CPT | Performed by: INTERNAL MEDICINE

## 2022-09-19 PROCEDURE — 82248 BILIRUBIN DIRECT: CPT | Performed by: INTERNAL MEDICINE

## 2022-09-19 PROCEDURE — B240ZZ3 ULTRASONOGRAPHY OF SINGLE CORONARY ARTERY, INTRAVASCULAR: ICD-10-PCS | Performed by: INTERNAL MEDICINE

## 2022-09-19 DEVICE — STENT CORONARY DES SYNERGY XD MR US 3.00X20MM H7493941820300: Type: IMPLANTABLE DEVICE | Status: FUNCTIONAL

## 2022-09-19 DEVICE — STENT CORONARY DES SYNERGY XD MR US 3.00X16MM H7493941816300: Type: IMPLANTABLE DEVICE | Status: FUNCTIONAL

## 2022-09-19 RX ORDER — NITROGLYCERIN 0.4 MG/1
0.4 TABLET SUBLINGUAL EVERY 5 MIN PRN
Status: DISCONTINUED | OUTPATIENT
Start: 2022-09-19 | End: 2022-09-19

## 2022-09-19 RX ORDER — LORAZEPAM 2 MG/ML
0.5 INJECTION INTRAMUSCULAR
Status: DISCONTINUED | OUTPATIENT
Start: 2022-09-19 | End: 2022-09-19 | Stop reason: HOSPADM

## 2022-09-19 RX ORDER — ONDANSETRON 4 MG/1
4 TABLET, ORALLY DISINTEGRATING ORAL EVERY 6 HOURS PRN
Status: DISCONTINUED | OUTPATIENT
Start: 2022-09-19 | End: 2022-09-19

## 2022-09-19 RX ORDER — ASPIRIN 81 MG/1
81 TABLET, CHEWABLE ORAL DAILY
Qty: 30 TABLET | Refills: 3 | Status: SHIPPED | OUTPATIENT
Start: 2022-09-20

## 2022-09-19 RX ORDER — ASPIRIN 81 MG/1
243 TABLET, CHEWABLE ORAL ONCE
Status: COMPLETED | OUTPATIENT
Start: 2022-09-19 | End: 2022-09-19

## 2022-09-19 RX ORDER — NALOXONE HYDROCHLORIDE 0.4 MG/ML
0.4 INJECTION, SOLUTION INTRAMUSCULAR; INTRAVENOUS; SUBCUTANEOUS
Status: ACTIVE | OUTPATIENT
Start: 2022-09-19 | End: 2022-09-20

## 2022-09-19 RX ORDER — ASPIRIN 81 MG/1
81 TABLET, CHEWABLE ORAL ONCE
Status: DISCONTINUED | OUTPATIENT
Start: 2022-09-19 | End: 2022-09-19

## 2022-09-19 RX ORDER — FLUMAZENIL 0.1 MG/ML
0.2 INJECTION, SOLUTION INTRAVENOUS
Status: ACTIVE | OUTPATIENT
Start: 2022-09-19 | End: 2022-09-20

## 2022-09-19 RX ORDER — ONDANSETRON 2 MG/ML
4 INJECTION INTRAMUSCULAR; INTRAVENOUS EVERY 6 HOURS PRN
Status: DISCONTINUED | OUTPATIENT
Start: 2022-09-19 | End: 2022-09-19

## 2022-09-19 RX ORDER — HEPARIN SODIUM 1000 [USP'U]/ML
INJECTION, SOLUTION INTRAVENOUS; SUBCUTANEOUS
Status: DISCONTINUED | OUTPATIENT
Start: 2022-09-19 | End: 2022-09-19 | Stop reason: HOSPADM

## 2022-09-19 RX ORDER — OXYCODONE HYDROCHLORIDE 5 MG/1
5 TABLET ORAL EVERY 4 HOURS PRN
Status: DISCONTINUED | OUTPATIENT
Start: 2022-09-19 | End: 2022-09-20 | Stop reason: HOSPADM

## 2022-09-19 RX ORDER — LORAZEPAM 0.5 MG/1
0.5 TABLET ORAL
Status: DISCONTINUED | OUTPATIENT
Start: 2022-09-19 | End: 2022-09-19 | Stop reason: HOSPADM

## 2022-09-19 RX ORDER — VERAPAMIL HYDROCHLORIDE 2.5 MG/ML
INJECTION, SOLUTION INTRAVENOUS
Status: DISCONTINUED | OUTPATIENT
Start: 2022-09-19 | End: 2022-09-19 | Stop reason: HOSPADM

## 2022-09-19 RX ORDER — ASPIRIN 325 MG
325 TABLET ORAL ONCE
Status: DISCONTINUED | OUTPATIENT
Start: 2022-09-19 | End: 2022-09-19 | Stop reason: ALTCHOICE

## 2022-09-19 RX ORDER — SODIUM CHLORIDE 9 MG/ML
INJECTION, SOLUTION INTRAVENOUS CONTINUOUS
Status: DISCONTINUED | OUTPATIENT
Start: 2022-09-19 | End: 2022-09-19 | Stop reason: HOSPADM

## 2022-09-19 RX ORDER — OXYCODONE HYDROCHLORIDE 5 MG/1
10 TABLET ORAL EVERY 4 HOURS PRN
Status: DISCONTINUED | OUTPATIENT
Start: 2022-09-19 | End: 2022-09-20 | Stop reason: HOSPADM

## 2022-09-19 RX ORDER — FENTANYL CITRATE 50 UG/ML
25 INJECTION, SOLUTION INTRAMUSCULAR; INTRAVENOUS
Status: DISCONTINUED | OUTPATIENT
Start: 2022-09-19 | End: 2022-09-20 | Stop reason: HOSPADM

## 2022-09-19 RX ORDER — FENTANYL CITRATE 50 UG/ML
INJECTION, SOLUTION INTRAMUSCULAR; INTRAVENOUS
Status: DISCONTINUED | OUTPATIENT
Start: 2022-09-19 | End: 2022-09-19 | Stop reason: HOSPADM

## 2022-09-19 RX ORDER — NITROGLYCERIN 5 MG/ML
VIAL (ML) INTRAVENOUS
Status: DISCONTINUED | OUTPATIENT
Start: 2022-09-19 | End: 2022-09-19 | Stop reason: HOSPADM

## 2022-09-19 RX ORDER — SODIUM CHLORIDE 9 MG/ML
INJECTION, SOLUTION INTRAVENOUS CONTINUOUS
Status: ACTIVE | OUTPATIENT
Start: 2022-09-19 | End: 2022-09-19

## 2022-09-19 RX ORDER — LIDOCAINE 40 MG/G
CREAM TOPICAL
Status: DISCONTINUED | OUTPATIENT
Start: 2022-09-19 | End: 2022-09-19

## 2022-09-19 RX ORDER — NALOXONE HYDROCHLORIDE 0.4 MG/ML
0.2 INJECTION, SOLUTION INTRAMUSCULAR; INTRAVENOUS; SUBCUTANEOUS
Status: ACTIVE | OUTPATIENT
Start: 2022-09-19 | End: 2022-09-20

## 2022-09-19 RX ORDER — ACETAMINOPHEN 325 MG/1
650 TABLET ORAL EVERY 4 HOURS PRN
Status: DISCONTINUED | OUTPATIENT
Start: 2022-09-19 | End: 2022-09-19

## 2022-09-19 RX ORDER — METOPROLOL TARTRATE 1 MG/ML
5 INJECTION, SOLUTION INTRAVENOUS
Status: DISCONTINUED | OUTPATIENT
Start: 2022-09-19 | End: 2022-09-20 | Stop reason: HOSPADM

## 2022-09-19 RX ORDER — POTASSIUM CHLORIDE 1500 MG/1
20 TABLET, EXTENDED RELEASE ORAL
Status: COMPLETED | OUTPATIENT
Start: 2022-09-19 | End: 2022-09-19

## 2022-09-19 RX ORDER — HYDRALAZINE HYDROCHLORIDE 20 MG/ML
10 INJECTION INTRAMUSCULAR; INTRAVENOUS EVERY 4 HOURS PRN
Status: DISCONTINUED | OUTPATIENT
Start: 2022-09-19 | End: 2022-09-20 | Stop reason: HOSPADM

## 2022-09-19 RX ORDER — ASPIRIN 81 MG/1
81 TABLET ORAL DAILY
Status: DISCONTINUED | OUTPATIENT
Start: 2022-09-20 | End: 2022-09-20 | Stop reason: HOSPADM

## 2022-09-19 RX ADMIN — ASPIRIN 81 MG CHEWABLE TABLET 81 MG: 81 TABLET CHEWABLE at 08:51

## 2022-09-19 RX ADMIN — ROSUVASTATIN CALCIUM 40 MG: 20 TABLET, FILM COATED ORAL at 20:47

## 2022-09-19 RX ADMIN — IPRATROPIUM BROMIDE AND ALBUTEROL SULFATE 3 ML: .5; 3 SOLUTION RESPIRATORY (INHALATION) at 10:55

## 2022-09-19 RX ADMIN — METOPROLOL SUCCINATE 25 MG: 25 TABLET, EXTENDED RELEASE ORAL at 08:51

## 2022-09-19 RX ADMIN — CEFTRIAXONE SODIUM 1 G: 1 INJECTION, POWDER, FOR SOLUTION INTRAMUSCULAR; INTRAVENOUS at 23:17

## 2022-09-19 RX ADMIN — CEFTRIAXONE SODIUM 1 G: 1 INJECTION, POWDER, FOR SOLUTION INTRAMUSCULAR; INTRAVENOUS at 01:50

## 2022-09-19 RX ADMIN — SODIUM CHLORIDE: 9 INJECTION, SOLUTION INTRAVENOUS at 11:22

## 2022-09-19 RX ADMIN — SODIUM CHLORIDE: 9 INJECTION, SOLUTION INTRAVENOUS at 16:56

## 2022-09-19 RX ADMIN — MOMETASONE FUROATE 2 PUFF: 220 INHALANT RESPIRATORY (INHALATION) at 08:51

## 2022-09-19 RX ADMIN — POTASSIUM CHLORIDE 20 MEQ: 1500 TABLET, EXTENDED RELEASE ORAL at 11:21

## 2022-09-19 RX ADMIN — LEVOTHYROXINE SODIUM 75 MCG: 75 TABLET ORAL at 08:51

## 2022-09-19 RX ADMIN — ASPIRIN 81 MG CHEWABLE TABLET 243 MG: 81 TABLET CHEWABLE at 11:21

## 2022-09-19 RX ADMIN — HUMAN ALBUMIN MICROSPHERES AND PERFLUTREN 9 ML: 10; .22 INJECTION, SOLUTION INTRAVENOUS at 08:04

## 2022-09-19 RX ADMIN — TICAGRELOR 90 MG: 90 TABLET ORAL at 23:18

## 2022-09-19 ASSESSMENT — ACTIVITIES OF DAILY LIVING (ADL)
ADLS_ACUITY_SCORE: 22

## 2022-09-19 NOTE — PROGRESS NOTES
[facility]    Hospitalist Progress Note    Assessment & Plan   Edda Mckeon is a 72 year old female with history of asthma, history of bronchiectasis, LAILA on CPAP mitral regurgitation, history moderate to severe mitral annular calcification with moderate MR, dyslipidemia palpitations 2021 with Zio patch showing short burst of SVT longest 20 beats who presents with chest pain.  Patient presents with new chest pain over the last several days.  nstemi  Unstable angina  Dyslipidemia  Hypertension  --Presents with exertional chest pain, radiating down her arms, resolved with nitroglycerin in the emergency department, mild troponin elevation noted in the ED.  Patient she was to [facility] emergency department and left against advice on 9/17.  --Echo shows wall motion abnormalities, EKG had T wave inversions V1-V3, patient does have reactive airway disease.  --Appreciate cardiology input, continue aspirin, metoprolol, heparin drip initiated in the emergency department, plan for angiogram noted later today.  --TSH is within normal limits, procalcitonin negative, .  Hemoglobin A1c pending.  h/o of asthma  Asthma exacerbation  -Asthma next controller inhaler as needed albuterol.  She has a history of bronchiectasis.  -CT chest PE protocol at outside ER yesterday without pulmonary emboli.  Lungs clear.  --Patient refused prednisone, continue duo nebs every 4 hours, wean down oxygen as possible, encourage incentive spirometry use, continue as needed albuterol.  Hold antibiotics as CT chest does not indicate any sign of infiltrate.  Obstructive sleep apnea  --Continue PTA CPAP  Moderate MR  Lower extremity edema  Chronic  diastolic heart failure  Grade 1 diastolic dysfunction  -Echocardiogram June 2021 showed normal EF 55 to 60%.  Diastolic dysfunction not assessed.  Normal LV size and thickness.  Moderate to severe mitral annular calcification.  1+ mitral regurgitation.  Thought to have moderate  MR per Dr. Newell her cardiologist.  -She has some lymphedema but no pitting edema in her lower extremities BNP is normal.  Lung findings are likely related to asthma exacerbation  --hold on further diuresis.  Echocardiogram completed  --Can use as needed Lasix  Left hydronephrosis  Microscopic hematuria, abnormal urinalysis 9/14  Possible UTI  Bilateral renal stones  --Patient notes 2 to 3 months of left-sided upper quadrant pain, renal ultrasound done through PCP indicates bilateral renal stones and left-sided hydronephrosis.  --Urinalysis shows microscopic hematuria with white cells, started on antibiotics-Rocephin  -Urology consult pending.  DVT Prophylaxis: On heparin drip  Code Status: Full Code     Disposition: Expected discharge in 2 days depending on angiogram results.  Clinically Significant Risk Factors Present on Admission             # Hypoalbuminemia: Albumin = 2.6 g/dL (Ref range: 3.4 - 5.0 g/dL) on admission, will monitor as appropriate        # Obesity: Estimated body mass index is 33.2 kg/m  as calculated from the following:    Height as of this encounter: 1.524 m (5').    Weight as of this encounter: 77.1 kg (170 lb).        Alma Shaffer MD  Text Page   (7am to 6pm)    Interval History   Denies any active chest discomfort, plan for angiogram noted later today.  Echo obtained this admission indicate possible  wall motion abnormalities.  -Data reviewed today: I reviewed all new labs and imaging results over the last 24 hours.  Physical Exam   Temp: 97.5  F (36.4  C) Temp src: Axillary BP: 132/76 Pulse: 60   Resp: 18 SpO2: 96 % O2 Device: None (Room air)    Vitals:    09/18/22 1721 09/18/22 2243 09/19/22 0640   Weight: 74.8 kg (165 lb) 77.2 kg (170 lb 1.6 oz) 77.1 kg (170 lb)     Vital Signs with Ranges  Temp:  [97.5  F (36.4  C)-99  F (37.2  C)] 97.5  F (36.4  C)  Pulse:  [] 60  Resp:  [13-32] 18  BP: (120-156)/(66-95) 132/76  SpO2:  [93 %-97 %] 96 %  I/O last 3 completed shifts:  In: 150  [P.O.:50; I.V.:100]  Out: 500 [Urine:500]    Constitutional:Awake, alert, cooperative, no apparent distress  Respiratory: Clear to auscultation bilaterally, no crackles or wheezing  Cardiovascular: Regular rate and rhythm, normal S1 and S2, and no murmur noted  GI: Normal bowel sounds, soft, non-distended, non-tender  Skin/Integumen: No rashes, no cyanosis, no edema  Neuro : moving all 4 extremities, no focal deficit noted     Medications     - MEDICATION INSTRUCTIONS -       - MEDICATION INSTRUCTIONS -       heparin 700 Units/hr (22 0615)     - MEDICATION INSTRUCTIONS -       - MEDICATION INSTRUCTIONS -       - MEDICATION INSTRUCTIONS -       ACE/ARB/ARNI NOT PRESCRIBED       sodium chloride 150 mL/hr at 22 1122       aspirin  81 mg Oral Daily     cefTRIAXone  1 g Intravenous Q24H     ipratropium - albuterol 0.5 mg/2.5 mg/3 mL  3 mL Nebulization Q4H     levothyroxine  75 mcg Oral QAM AC     metoprolol succinate ER  25 mg Oral Daily     mometasone  2 puff Inhalation BID     rosuvastatin  40 mg Oral QPM     sodium chloride (PF)  3 mL Intracatheter Q8H     sodium chloride (PF)  3 mL Intracatheter Q8H       Data   Recent Labs   Lab 22  0420 22  1830   WBC 5.9 6.5   HGB 12.3 13.3   * 99    244   INR  --  1.01    140   POTASSIUM 3.5 4.2   CHLORIDE 107 106   CO2 28 28   BUN 16 16   CR 0.72 0.85   ANIONGAP 8 6   ELY 8.7 9.1   * 137*   ALBUMIN 2.6*  --    PROTTOTAL 6.3*  --    BILITOTAL 0.3  --    ALKPHOS 84  --    ALT 16  --    AST 18  --      Recent Labs   Lab 22  0420 22  1830   * 137*       Imaging:   Recent Results (from the past 24 hour(s))   Echocardiogram Complete   Result Value    LVEF  55-60%    Narrative    [id number]  [id number]  [id number]  [id number]^ROBERT^PETER^FRANCISCA     [facility]  Echocardiography Laboratory  [address] [place] [id number]     Name: MIGUEL MORRISSEY  MRN: [id number]  : 1950  Study Date: 2022  07:38 AM  Age: 72 yrs  Gender: Female  Patient Location: [facility]  Reason For Study: Chest Pain, Chest Tightness, Chest Pressure  Ordering Physician: LEE JONES  Performed By: Libby Beasley     BSA: 1.7 m2  Height: 60 in  Weight: 170 lb  HR: 60  BP: 120/78 mmHg  ______________________________________________________________________________  Procedure  Complete Portable Echo Adult. Optison (NDC #[id number]) given intravenously.  ______________________________________________________________________________  Interpretation Summary     Left ventricular systolic function is normal.  The visual ejection fraction is 55-60%.  Base-mid inferolateral, apical lateral, mid-apical inferior hypokinesis.  Grade I diastolic dysfunction.  The right ventricular systolic function is normal.  The right ventricular systolic function is normal.  Mild-moderate TR.  The inferior vena cava is normal.  There is no pericardial effusion.     Compared to prior study dated 6/14/2021, there are wall motion abnormalities  evident as described above.  ______________________________________________________________________________  Left Ventricle  The left ventricle is normal in size. Proximal septal thickening is noted.  There is normal left ventricular wall thickness. Left ventricular systolic  function is normal. The visual ejection fraction is 55-60%. Grade I or early  diastolic dysfunction. Base-mid inferolateral, apical lateral, mid-apical  inferior hypokinesis.     Right Ventricle  The right ventricle is normal size. The right ventricular systolic function is  normal.     Atria  Normal left atrial size. Right atrial size is normal.     Mitral Valve  There is moderate mitral annular calcification. There is trace mitral  regurgitation.     Tricuspid Valve  The tricuspid valve is normal in structure and function. The right ventricular  systolic pressure is approximated at 27.9 mmHg plus the right atrial pressure.  There is mild to moderate  (1-2+) tricuspid regurgitation.     Aortic Valve  The aortic valve is normal in structure and function.     Pulmonic Valve  The pulmonic valve is not well seen, but is grossly normal. There is mild (1+)  pulmonic valvular regurgitation.     Vessels  The aortic root is normal size. The ascending aorta is Mildly dilated. The  inferior vena cava is normal.     Pericardium  There is no pericardial effusion.     ______________________________________________________________________________  MMode/2D Measurements & Calculations  IVSd: 0.95 cm  LVIDd: 4.8 cm  LVIDs: 3.6 cm  LVPWd: 0.83 cm  FS: 25.2 %     LV mass(C)d: 145.3 grams  LV mass(C)dI: 83.4 grams/m2  Ao root diam: 2.9 cm  LA dimension: 3.9 cm  asc Aorta Diam: 3.8 cm  LA/Ao: 1.3  LVOT diam: 1.8 cm  LVOT area: 2.5 cm2  LA Volume (BP): 44.3 ml  LA Volume Index (BP): 25.5 ml/m2  RWT: 0.34     Doppler Measurements & Calculations  MV E max ewelina: 95.4 cm/sec  MV A max ewelina: 113.0 cm/sec  MV E/A: 0.84     MV max P.3 mmHg  MV mean P.0 mmHg  MV V2 VTI: 42.7 cm  MV dec time: 0.27 sec  PA acc time: 0.13 sec  TR max ewelina: 264.0 cm/sec  TR max P.9 mmHg  E/E' av.4  Lateral E/e': 12.4  Medial E/e': 12.5     ______________________________________________________________________________  Report approved by: Viki Silva 2022 09:15 AM

## 2022-09-19 NOTE — PROGRESS NOTES
"SPIRITUAL HEALTH SERVICES Progress Note  [facility]  Heart Center    Saw pt Edda Mckeon per admission request.    Had a brief visit with Edda who was awaiting a procedure in the Heart Cath Lab.      Distress - Edda shared that she \"has a lot going on and feeling stressed.\" She named that she cares for a daughter with \"special needs.\" Her  was recently diagnosed with Covid and \"not able to visit me.\" She expressed her concern and hope that she's \"making good decisions\" about her healthcare and an upcoming procedure.      Coping - Edda shared that prayer is important and welcome the offer of prayer.      Meaning-Making - Not named    Offered assurance through prayer which incorporated conversational themes. Edda would welcome a follow up visit tomorrow.      Plan - Will follow up tomorrow. [facility] remains available for support.    Shamika Jha MDiv  Associate   [phone] (pager)    "

## 2022-09-19 NOTE — CONSULTS
Urology Consult    Name: Edda Mckeon    MRN: [id number]   YOB: 1950               Chief Complaint:   LUQ Pain, history of renal stones, mild left hydro    History is obtained from the patient and chart review          History of Present Illness:   Edda Mckeon is a pleasant 72 year old female admitted for cardiac workup (unstable angina vs. NSTEMI), who endorsed 2-3 months of LUQ pain on review of systems that waxes and wanes. She follows closely with Dr. Topete from [facility] for a history of nephrolithiasis. She most recently had a CT A/P in July 2022 which revealed bilateral non-obstructing nephrolithiasis. She has an appt with Urology on Thurs.     On 9/14 she underwent a renal US (ordered by her PCP for LUQ pain). She was noted on this study to have mild bilateral hydronephrosis. On the right side, this resolved with voiding but it did not on the left.     A UA was obtained which revealed 15 wbc, 17 rbc, and moderate LE. Cr is at her baseline of 0.72. She is currently afebrile without clinical signs or symptoms of infection. Urology was consulted for further recommendations.          Past Medical History:     Past Medical History:   Diagnosis Date     Palpitations      Shortness of breath             Past Surgical History:   History reviewed. No pertinent surgical history.         Social History:     Social History     Tobacco Use     Smoking status: Never Smoker     Smokeless tobacco: Never Used   Substance Use Topics     Alcohol use: Yes     Comment: occs            Family History:   No family history on file.         Allergies:     Allergies   Allergen Reactions     Atorvastatin      Muscle aches     Hmg-Coa-R Inhibitors      Muscle aches     Pravastatin      Muscle aches     Scopolamine Nausea and Vomiting and Swelling            Medications:     Current Facility-Administered Medications   Medication     acetaminophen (TYLENOL) tablet 650 mg    Or     acetaminophen (TYLENOL)  Suppository 650 mg     aspirin (ASA) chewable tablet 81 mg     bisacodyl (DULCOLAX) suppository 10 mg     cefTRIAXone (ROCEPHIN) 1 g vial to attach to  mL bag for ADULTS or NS 50 mL bag for PEDS     Continuing beta blocker from home medication list OR beta blocker order already placed during this visit     Continuing statin from home medication list OR statin order already placed during this visit     heparin 25,000 units in 0.45% NaCl 250 mL ANTICOAGULANT infusion     HOLD: nitroGLYcerin IF     ipratropium - albuterol 0.5 mg/2.5 mg/3 mL (DUONEB) neb solution 3 mL     levothyroxine (SYNTHROID/LEVOTHROID) tablet 75 mcg     lidocaine (LMX4) cream     lidocaine 1 % 0.1-1 mL     LORazepam (ATIVAN) injection 0.5 mg    Or     LORazepam (ATIVAN) tablet 0.5 mg     medication instruction     melatonin tablet 1 mg     metoprolol succinate ER (TOPROL XL) 24 hr tablet 25 mg     mometasone (ASMANEX TWISTHALER) 220 MCG/INH inhaler 2 puff     nitroGLYcerin (NITROSTAT) sublingual tablet 0.4 mg     ondansetron (ZOFRAN ODT) ODT tab 4 mg    Or     ondansetron (ZOFRAN) injection 4 mg     Patient is already receiving anticoagulation with heparin, enoxaparin (LOVENOX), warfarin (COUMADIN)  or other anticoagulant medication     Patient is NOT allergic to contrast dye     prochlorperazine (COMPAZINE) injection 5 mg    Or     prochlorperazine (COMPAZINE) tablet 5 mg    Or     prochlorperazine (COMPAZINE) suppository 12.5 mg     Reason ACE/ARB/ARNI order not selected     rosuvastatin (CRESTOR) tablet 40 mg     senna-docusate (SENOKOT-S/PERICOLACE) 8.6-50 MG per tablet 1 tablet    Or     senna-docusate (SENOKOT-S/PERICOLACE) 8.6-50 MG per tablet 2 tablet     sodium chloride (PF) 0.9% PF flush 3 mL     sodium chloride (PF) 0.9% PF flush 3 mL     sodium chloride (PF) 0.9% PF flush 3 mL     sodium chloride (PF) 0.9% PF flush 3 mL     sodium chloride (PF) 0.9% PF flush 3 mL     sodium chloride 0.9% infusion             Review of Systems:     ROS: 10 point ROS neg other than the symptoms noted above in the HPI           Physical Exam:   VS:  T: 97.8    HR: 60    BP: 144/77    RR: 18   GEN:  AOx3.  NAD.              Data:   All laboratory data reviewed:    Recent Labs   Lab 09/19/22  0420 09/18/22  1830   WBC 5.9 6.5   HGB 12.3 13.3    244     Recent Labs   Lab 09/19/22  0420 09/18/22  1830    140   POTASSIUM 3.5 4.2   CHLORIDE 107 106   CO2 28 28   BUN 16 16   CR 0.72 0.85   * 137*   ELY 8.7 9.1   MAG 2.2  --      Recent Labs   Lab 09/19/22  0144   COLOR Straw   APPEARANCE Clear   URINEGLC Negative   URINEBILI Negative   URINEKETONE Negative   SG 1.010   URINEPH 5.0   PROTEIN Negative   NITRITE Negative   LEUKEST Moderate*   RBCU 17*   WBCU 15*     All pertinent imaging reviewed:    CT scan of the abdomen/pelvis 7/27/22 (READ ONLY):   1.  Nonobstructing renal stones, largest in the left kidney measuring 6.5 mm. No ureteric stone or hydronephrosis.     2.  Normal appendix.     3.  A few uterine fibroids with calcification.     4.  Small esophageal hiatal hernia.     5.  Colonic diverticula without CT evidence for diverticulitis.     Renal ultrasound 9/14/22 (READ ONLY):   IMPRESSION:   1.  Bilateral intrarenal calculi.   2.  Mild left hydronephrosis with did not change significantly following voiding.   3.  Mild right hydronephrosis which developed while the patient was laying down and resolved after voiding.            Impression and Plan:   Impression:   Edda Mckeon is a 72 year old female with 3 months of intermittent left flank pain, micro hematuria and hx of nephrolithiasis      Plan:     - Discussed outpt follow-up with  Urology. She would like to proceed with CT Urogram prior to discharge home to be better prepared for her follow-up on Thurs.     -CT Urogram ordered.   -Can discharge from urology perspective today.        This patient's exam findings, labs, and imaging discussed with urology staff surgeon Dr. Carreon who  developed the treatment plan.       Lucia Hammond PA-C  [facility]  [phone]  Securely message with the Vocera Web Console   Monday-Wednesday and Friday

## 2022-09-19 NOTE — PROGRESS NOTES
RECEIVING UNIT ED HANDOFF REVIEW    ED Nurse Handoff Report was reviewed by: Jacqueline Saba RN on September 18, 2022 at 9:48 PM

## 2022-09-19 NOTE — PROGRESS NOTES
Attempted to see patient today, however she is in the cath lab this afternoon.     Full consult to follow tomorrow.     Nava Post MD  PGY-5 Urology  Pager [phone]

## 2022-09-19 NOTE — PLAN OF CARE
[id number]: A&Ox4. VSS on RA, home cpap at night. Tele SB/SR. Denies CP. Heparin infusing. TRIPLETT. LS diminished with exp wheezes. BLE +2 edema. Up with 1 assist and walker. NPO  @ 0000. Plan for echo and cards consult.

## 2022-09-19 NOTE — CONSULTS
CARDIOLOGY CONSULT    REASON FOR CONSULT:  Chest pain    PRIMARY CARE PHYSICIAN:  Emi Monk    HISTORY OF PRESENT ILLNESS:  Ms. Mckeon is a 72 year old woman with PMH significant for asthma, bronchiectasis, LAILA on CPAP who presents for the evaluation of chest discomfort and abdominal discomfort and shortness of breath.  Edda states she really hasn't been feeling well for the past three months.  She states she was treated for a UTI and was diagnosed with hydronephrosis and kidney stones.  She has continued to have abdominal discomfort and LUQ discomfort.  More recently, she has noted intermittent chest discomfort which she states occurs at random and resolves spontaneously.  She has a hx of asthma and feels this has been under good control recently.  She states on Friday night she was at her husbands high school reunion and noted her HR was 170 bpm.  She states she felt maybe a little light-headed, but otherwise felt okay.   She continued to note exertional shortness of breath, abdominal discomfort and went to the ED on Saturday at [facility].  Her troponin was mildly elevated at 0.078 which was mildly elevated.  This was resulted after she left against medical advice as she states she had a very poor experiennce in the ED.  She had a chest CTA which was negative for PE and ultrasound was negative for PE.        PAST MEDICAL HISTORY:  1. Asthma  2.  Bronchiectasis  3.  LAILA on CPAP  4.  Moderate mitral regurgitation  5.  Dyslipidemia  6.  Brief PSVT          MEDICATIONS:  Current Facility-Administered Medications   Medication     acetaminophen (TYLENOL) tablet 650 mg    Or     acetaminophen (TYLENOL) Suppository 650 mg     aspirin (ASA) chewable tablet 81 mg     bisacodyl (DULCOLAX) suppository 10 mg     cefTRIAXone (ROCEPHIN) 1 g vial to attach to  mL bag for ADULTS or NS 50 mL bag for PEDS     Continuing beta blocker from home medication list OR beta blocker order already placed during this visit      Continuing statin from home medication list OR statin order already placed during this visit     heparin 25,000 units in 0.45% NaCl 250 mL ANTICOAGULANT infusion     HOLD: nitroGLYcerin IF     ipratropium - albuterol 0.5 mg/2.5 mg/3 mL (DUONEB) neb solution 3 mL     levothyroxine (SYNTHROID/LEVOTHROID) tablet 75 mcg     lidocaine (LMX4) cream     lidocaine 1 % 0.1-1 mL     medication instruction     melatonin tablet 1 mg     metoprolol succinate ER (TOPROL XL) 24 hr tablet 25 mg     mometasone (ASMANEX TWISTHALER) 220 MCG/INH inhaler 2 puff     nitroGLYcerin (NITROSTAT) sublingual tablet 0.4 mg     ondansetron (ZOFRAN ODT) ODT tab 4 mg    Or     ondansetron (ZOFRAN) injection 4 mg     Patient is already receiving anticoagulation with heparin, enoxaparin (LOVENOX), warfarin (COUMADIN)  or other anticoagulant medication     prochlorperazine (COMPAZINE) injection 5 mg    Or     prochlorperazine (COMPAZINE) tablet 5 mg    Or     prochlorperazine (COMPAZINE) suppository 12.5 mg     Reason ACE/ARB/ARNI order not selected     rosuvastatin (CRESTOR) tablet 40 mg     senna-docusate (SENOKOT-S/PERICOLACE) 8.6-50 MG per tablet 1 tablet    Or     senna-docusate (SENOKOT-S/PERICOLACE) 8.6-50 MG per tablet 2 tablet     sodium chloride (PF) 0.9% PF flush 3 mL     sodium chloride (PF) 0.9% PF flush 3 mL       ALLERGIES:  Allergies   Allergen Reactions     Atorvastatin      Muscle aches     Hmg-Coa-R Inhibitors      Muscle aches     Pravastatin      Muscle aches     Scopolamine Nausea and Vomiting and Swelling       SOCIAL HISTORY:  I have reviewed this patient's social history and updated it with pertinent information if needed. Edda Mckeon  reports that she has never smoked. She has never used smokeless tobacco. She reports current alcohol use. She reports that she does not use drugs.    FAMILY HISTORY:  I have reviewed this patient's family history and updated it with pertinent information if needed.   No family history on  file.    REVIEW OF SYSTEMS:  A complete ROS was obtained and the pertinent positives are outlined in the history of present illness above.  The remainder of systems is negative.      PHYSICAL EXAM:  Temp: (P) 97.5  F (36.4  C) Temp src: (P) Axillary BP: (P) 132/76 Pulse: (P) 60   Resp: (P) 18 SpO2: (P) 96 % O2 Device: (P) None (Room air)    Vital Signs with Ranges  Temp:  [97.5  F (36.4  C)-99  F (37.2  C)] (P) 97.5  F (36.4  C)  Pulse:  [] (P) 60  Resp:  [13-32] (P) 18  BP: (120-156)/(66-95) (P) 132/76  SpO2:  [93 %-97 %] (P) 96 %  170 lbs 0 oz    Constitutional: awake, alert, no distress  Eyes: PERRL, sclera nonicteric  ENT: trachea midline  Respiratory: CTAB  Cardiovascular: RRR no m/r/g, no JVD  GI: nondistended, nontender, bowel sounds present  Lymph/Hematologic: no lymphadenopathy  Skin: dry, no rash  Musculoskeletal: good muscle tone, no edema bilaterally  Neurologic: no focal deficits  Neuropsychiatric: appropriate affact    DATA:  Labs:   Troponin 0.078 (at [facility]), 25-24-21   Cr 0.72   K 3.5  Hgb 12.3      EKG: Dated 9/18/22 reviewed personally.  Normal sinus rhythm with T wave inversions in the anterior leads      Echocardiogram 9/19/22 images reviewed pesonally  Left ventricular systolic function is normal.  The visual ejection fraction is 55-60%.  Base-mid inferolateral, apical lateral, mid-apical inferior hypokinesis.  Grade I diastolic dysfunction.  The right ventricular systolic function is normal.  The right ventricular systolic function is normal.  Mild-moderate TR.  The inferior vena cava is normal.  There is no pericardial effusion.     Compared to prior study dated 6/14/2021, there are wall motion abnormalities  evident as described above.  ______________________________________________________________________________      ASSESSMENT:  1.  Chest pain/NSTEMI:  Ruled in by very mildly positive biomarkers at [facility] yesterday; troponins WNL here.  ECG with T wave inversions anterior leads and  echocardiogram demonstrates new regional wall motion abnormality   2. PSVT:  Brief PSVT episodes noted on zio patch monitor last year.  She states she was prescribed a BB (propanolol?) but stopped it as she did not like taking it.  Episode of rates up to 170 per patient report on Friday; self limiting  3.  Asthma exacerbation:  Improved on DUO Nebs.  4.  LAILA:  On CPAP  5.  Left hydronephrosis, bilateral renal stones:  On empiric rocephin.  Urology consult pending.        RECOMMENDATIONS:  1. Reviewed echocardiogram, ECG and lab findings.  Currently chest pain free.   Recommend proceeding with diagnostic coronary angiogram.  Risks/benefits/alternatives were discussed with Edda and she is agreeable to proceeding.    2.  Continue ASA, heparin gtt, statin, BB.  3.  Urology consult pending for bilateral kidney stones.       Ariadne Boss MD [facility]  September 19, 2022        Clinically Significant Risk Factors Present on Admission            # Hypoalbuminemia: Albumin = 2.6 g/dL (Ref range: 3.4 - 5.0 g/dL) on admission, will monitor as appropriate      # Obesity: Estimated body mass index is 33.2 kg/m  as calculated from the following:    Height as of this encounter: 1.524 m (5').    Weight as of this encounter: 77.1 kg (170 lb).

## 2022-09-19 NOTE — ED NOTES
[facility]  ED Nurse Handoff Report    ED Chief complaint: Abnormal Labs and Chest Pain      ED Diagnosis:   Final diagnoses:   Unstable angina (H)       Code Status: to be addressed by provider    Allergies:   Allergies   Allergen Reactions     Atorvastatin      Muscle aches     Hmg-Coa-R Inhibitors      Muscle aches     Pravastatin      Muscle aches     Scopolamine Nausea and Vomiting and Swelling       Patient Story: sent from  to  [facility] yesterday and evaluated for worsening SOB, chest pain and EKG changes. Declined admission due to observation admission and left AMA.  Focused Assessment:  Today comes here for evaluation after called by [facility] staff telling her the 2nd troponin done yesterday was elevated and they recommended admission. Also was having lower extremity swelling which has decreased over past 24 after receiving lasix.    Treatments and/or interventions provided: IV, labs, ekg, ntg SL, IV heparin  Patient's response to treatments and/or interventions: stable, no chest tightness    To be done/followed up on inpatient unit:  monitor and treat chest pain    Does this patient have any cognitive concerns?: none    Activity level - Baseline/Home:  Independent  Activity Level - Current:   Independent    Patient's Preferred language: English   Needed?: No    Isolation: None  Infection: Not Applicable  Patient tested for COVID 19 prior to admission: YES  Bariatric?: No    Vital Signs:   Vitals:    09/18/22 1900 09/18/22 1915 09/18/22 1930 09/18/22 2000   BP: (!) 134/95 (!) 147/90 129/81 126/72   Pulse: 70 73 76 70   Resp: 20 13 16 24   Temp:       TempSrc:       SpO2: 94% 93% 93% 93%   Weight:           Cardiac Rhythm: SR    Was the PSS-3 completed:   Yes  What interventions are required if any?               Family Comments:  present---had recent +covid home test  OBS brochure/video discussed/provided to patient/family: No              Name of person  given brochure if not patient:               Relationship to patient:     For the majority of the shift this patient's behavior was Green.   Behavioral interventions performed were .    ED NURSE PHONE NUMBER: [phone]

## 2022-09-19 NOTE — H&P
[facility]    History and Physical  Hospitalist       Date of Admission:  9/18/2022    Assessment & Plan   Edda Mckeon is a 72 year old female with history of asthma, history of bronchiectasis, LAILA on CPAP mitral regurgitation, history moderate to severe mitral annular calcification with moderate MR, dyslipidemia palpitations 2021 with Zio patch showing short burst of SVT longest 20 beats who presents with chest pain.  Patient presents with new chest pain over the last several days.  Resolves with nitroglycerin.  Radiation to the arm.  Substernal.  Mild troponin elevation at outside ER.  Normal troponin here.  T wave inversions V1 to 3.  Also has approximately 2 months of left-sided left upper quadrant discomfort.  Recent renal ultrasound outpatient shows left hydronephrosis.  Urinalysis shows small amount of white blood cells and RBCs.  Also with bilateral wheezing and a history of asthma.  Concern for possible asthma exacerbation but also possibly unstable angina.    Principal Problem:  Concern for unstable angina (H)  Dyslipidemia  Hypertension    Assessment:   -Presents with several days of chest pain intermittent.  Possibly exertional at times.  Chest pain also down her arms.  Resolved with nitroglycerin in the emergency room.  Mild troponin elevation at outside ER.  With a level at point .078 up from a normal initial level.  -Patient left outside ER yesterday at [facility].  Advised by that ER on call back today to seek medical attention given elevated follow-up troponin.  -EKG with T wave inversions V1 through V3.  -Normal Lexiscan stress test November 2018.  -History of hypertension and dyslipidemia.  -Exam notable for reactive airway disease on exam.  -Troponin normal today in the emergency room.  -Concern for possible unstable angina.  Patient initiated on aspirin and heparin drip in the emergency room.    Plan: Serial troponins, telemetry continue heparin drip,  aspirin, metoprolol.  Hold on ACE inhibitor, continue Crestor.  Patient is intolerant to other statins or higher doses apparently.  A.m. lipid panel, hemoglobin A1c, cardiology consult, n.p.o. at midnight, a.m. CBC and BMP.,  Telemetry    History of asthma  Asthma exacerbation  -Asthma next controller inhaler as needed albuterol.  She has a history of bronchiectasis.  -CT chest PE protocol at outside ER yesterday without pulmonary emboli.  Lungs clear.  -She is afebrile.  She has had appears to be a increased cough over the last week.  History vague.  -Bilateral wheezing with clear reactive airway disease on exam.   -Increase in albuterol inhaler over the last few days.  -As would explain some of her shortness of breath and possibly her chest discomfort though was relieved with nitroglycerin  -Patient declines prednisone she states she gets very anxious on prednisone.  Plan-start DuoNebs every 4 hours, reassess in the morning.  Oxygen as needed.  Incentive spirometry.  Hold on empiric antibiotics-CT chest without infiltrate and no purulent cough.  White blood cell count normal.    Obstructive sleep apnea  -Continue prior to admission CPAP.    Moderate MR  -Echocardiogram June 2021 showed normal EF 55 to 60%.  Diastolic dysfunction not assessed.  Normal LV size and thickness.  Moderate to severe mitral annular calcification.  1+ mitral regurgitation.  Thought to have moderate MR per Dr. Newell her cardiologist.  -Patient noted edema lately.  She was given a dose of Lasix in the emergency room yesterday and noted significant diuresis with significant improvement in her lower extremity edema.  She does not think that it helped her breathing.  -She has some lymphedema but no pitting edema in her lower extremities BNP is normal.  Lung findings are likely related to asthma exacerbation  -Plan-hold on further diuresis.  Echocardiogram tomorrow,    Lower extremity edema  -See discussion above.  Has had edema lately.   Unclear if this is progressed.  Significantly improved with Lasix given the emergency room yesterday.  Significant diuresis per patient.  BnP normal today.  BNP elevated to 220 yesterday in the emergency room.  -May be related to her obstructive sleep apnea with asthma exacerbation and increased right-sided pressures.  Does have moderate MR.  -Hold on further diuresis.  Echocardiogram    Left hydronephrosis  Microscopic hematuria, abnormal urinalysis 9/14  Possible UTI  Bilateral renal stones  -Patient notes 2 to 3 months of left-sided/left upper quadrant pain.  Can come and go though worse at night at times no clear radiation.  Not associated with eating.  No GERD symptoms no bleeding.  -Through PCP patient renal ultrasound showed bilateral renal stones in the renal pelvis however left-sided hydronephrosis did not resolve with emptying of bladder.  Right-sided hydronephrosis did resolve.  Urinalysis showed microscopic hematuria and white blood cells at outside emergency room.  Unclear if urine culture was obtained.  No clear UTI symptoms.  States she was given Bactrim about a month ago for this pain.  But again was not having UTI symptoms at that time.  No clear improvement  -No classic renal colic at this time.  No CVA tenderness.  No left upper quadrant tenderness    Plan-we will check a urinalysis urine culture.  Empiric Rocephin after obtaining urine sample.  Urology consultation to determine whether her left-sided pain could be related to left hydro and if there is a ureteral stone      DVT Prophylaxis: Patient has an heparin drip  Code Status: Full Code    Disposition: Expected discharge in approximately 2 days it was her cardiac and neurologic issues have been fully evaluated.    Adal Haile MD, MD    Primary Care Physician   Emi Monk    Chief Complaint   Chest pain    History is obtained from the patient, ED MD, chart    History of Present Illness   Edda Mckeon is a 72 year old female  with history of asthma, history of bronchiectasis, LAILA on CPAP mitral regurgitation, history moderate to severe mitral annular calcification with moderate MR, dyslipidemia palpitations 2021 with Zio patch showing short burst of SVT longest 20 beats who presents with chest pain.    Patient has multiple symptoms.  Approximately 3 weeks ago she noted to have variability in her pulse which she noted on her apple watch.  This seemed more variable than before.  She had had a Zio patch through her primary cardiologist in the [facility] system in June 2021 which showed frequent but short bursts of SVT.  Patient thinks the variability in her heart rate became prominent in the last week.  Some shortness of breath.  She felt her heart rate between 50-1 60.  She is unsure how long it lasted in the tachycardic range.  She did feel a fluttering in her chest.  Over the last 2 and half days she has had some chest pain as well.  Chest pain is pressure-like radiates to both shoulders can occur with the fluttering and possibly with exertion.  She is also felt weaker with her ambulation over the last month or so.  Her symptoms seemed more prominent yesterday prompting her to go to the urgent care and ultimately to [facility] ER.  Patient does states she was using her albuterol inhaler more.  She does feel like her asthma is exacerbated slightly.  She has had a dry cough which seems to be increased.  No fevers or chills.  Urgent care and ER course as below.    Patient presented to [facility] urgent care yesterday with chest pain.  Cough and some shortness of breath.  She felt dizziness and lightheadedness.  Had a small amount of cough apparently.  He was having leg swelling.  Per ER note she was visibly short of breath at rest.  Tachycardic.  Diminished breath sounds bilaterally.  Bilateral lower extremity Dopplers negative for DVT.  There was a 2 x 2 x 2 cm left Baker's cyst.  CT chest PE protocol showed no PE.  Lungs and pleura  normal.  White blood cell count normal hemoglobin normal platelet count normal.  BMP normal initial troponin negative.  LFTs normal lipase normal BNP less than 150, normal.  TSH normal, D-dimer slightly elevated.  Urinalysis normal.  EKG read as no acute ischemia.  Plan was for observation admission but elected to leave AMA.  Patient called by emergency room at [facility] and informed that a follow-up troponin was 0.078 abnormal and recommended that they seek medical attention.    She did receive Lasix and she felt she had significant improvement in her edema.  She states occasionally she uses a diuretic but this is not on her medication list.  Edema had gotten worse lately.  She does not think the inhalers helped with her chest pain.  Today she continued to have chest pain though unclear if there was an exertional component.    Patient denies any GERD, dysphagia odynophagia.  No bloody urine or bloody stools.  No syncope or falls.    She states that her chest pain resolved in this emergency room visit with nitroglycerin.    She is also having chronic left lateral side pain.  No radiation to the groin.  No urinary frequency urgency though she states she had a UTI and was given a course of Bactrim.  When asked but she denies any specific UTI symptoms and refers to her left-sided left upper quadrant discomfort..  Her PCP set her up for a renal ultrasound on the 14th 4 days ago  This showed bilateral intrarenal calculi.  Mild left hydronephrosis.  Mild right hydronephrosis which resolved with voiding.  Left hydronephrosis did not resolve with voiding.  LFTs lipase normal yesterday in the emergency room.  She had 3-5 RBCs and 6-12 white blood cells.        Patient presented [facility] for further evaluation.  On arrival she did have some chest pain 1 out of 2.  Given nitroglycerin and resolved.  She was given 4 aspirin.  EKG showed T wave inversion V1 to 3.  Temperature 99.0.  Initially mildly hypertensive  now 124/79.  Normal oxygen saturations.  BMP normal, brain atretic peptide normal.  Troponin normal at 21.  Glucose 137.  White blood cell count 6.5 hemoglobin 13.3 platelet count 244.  Normal differential.  INR 1.0.  PTT normal.  COVID PCR negative.  Concern for possible unstable angina given chest pain mild elevation of troponin at outside ER and chest pain resolved with nitroglycerin.  Given aspirin 3 and 24 mg p.o. x1 and initiated on heparin drip.    Admit inpatient cardiac telemetry.    Past Medical History    I have reviewed this patient's medical history and updated it with pertinent information if needed.   Past Medical History:   Diagnosis Date     Palpitations      Shortness of breath        Past Surgical History   I have reviewed this patient's surgical history and updated it with pertinent information if needed.  History reviewed. No pertinent surgical history.    Prior to Admission Medications   Prior to Admission Medications   Prescriptions Last Dose Informant Patient Reported? Taking?   Cholecalciferol (VITAMIN D) 2000 units tablet 9/17/2022 at Unknown time Self Yes Yes   Sig: Take 2,000 Units by mouth daily   Gluc-Chonn-MSM-Boswellia-Vit D (GLUCOS CHONDROIT, BOSWELLIA,) TABS  Self Yes Yes   Sig: Take by mouth daily   Multiple vitamin TABS 9/17/2022 at Unknown time Self Yes Yes   Sig: Take 1 tablet by mouth daily   PAPAYA ENZYME PO 9/16/2022 Self Yes Yes   Sig: Take by mouth daily   Turmeric 450 MG CAPS 9/16/2022 Self Yes Yes   Sig: Take 1 capsule by mouth 2 times daily   albuterol (PROAIR HFA/PROVENTIL HFA/VENTOLIN HFA) 108 (90 Base) MCG/ACT inhaler Past Month at PRN Self Yes Yes   Sig: Inhale 1-2 puffs into the lungs every 6 hours as needed for shortness of breath / dyspnea or wheezing   aspirin 81 MG tablet 9/17/2022 at Unknown time Self Yes Yes   Sig: Take 81 mg by mouth daily   coenzyme Q-10 (CO-Q10) 50 MG capsule 9/16/2022 Self Yes Yes   Sig: Take 50 mg by mouth daily   fish oil-omega-3 fatty  acids 1000 MG capsule 9/16/2022 Self Yes Yes   Sig: Take 2 g by mouth every other day   levothyroxine (SYNTHROID/LEVOTHROID) 75 MCG tablet 9/18/2022 at AM Self Yes Yes   Sig: Take 75 mcg by mouth daily   metoprolol succinate ER (TOPROL-XL) 25 MG 24 hr tablet 9/16/2022 Self No Yes   Sig: Take 1 tablet (25 mg) by mouth daily   mometasone furoate (ASMANEX HFA) 200 MCG/ACT inhaler 9/16/2022 Self Yes Yes   Sig: Inhale 2 puffs into the lungs 2 times daily   order for DME  Self Yes No   Sig: Oxygen for home use. 2 liters per NC during daytime, 4 at night. Frequency: Continuous with portability.; Length of need: 99  Months.   polyvinyl alcohol (LIQUIFILM TEARS) 1.4 % ophthalmic solution Past Week at PRN Self Yes Yes   Sig: Place 2 drops into both eyes as needed for dry eyes   rosuvastatin (CRESTOR) 40 MG tablet 9/17/2022 at PM Self Yes Yes   Sig: Take 40 mg by mouth daily      Facility-Administered Medications: None     Allergies   Allergies   Allergen Reactions     Atorvastatin      Muscle aches     Hmg-Coa-R Inhibitors      Muscle aches     Pravastatin      Muscle aches     Scopolamine Nausea and Vomiting and Swelling       Social History   I have reviewed this patient's social history and updated it with pertinent information if needed. Edda Mckeon  reports that she has never smoked. She has never used smokeless tobacco. She reports current alcohol use. She reports that she does not use drugs.    Family History   I have reviewed this patient's family history and updated it with pertinent information if needed.   No family history on file.    Review of Systems   The 10 point Review of Systems is negative other than noted in the HPI or here.     Physical Exam   Temp: 99  F (37.2  C) Temp src: Temporal BP: 124/79 Pulse: 66   Resp: (!) 32 SpO2: 93 %      Vital Signs with Ranges  Temp:  [99  F (37.2  C)] 99  F (37.2  C)  Pulse:  [] 66  Resp:  [13-32] 32  BP: (124-156)/(72-95) 124/79  SpO2:  [93 %-95 %] 93 %  165 lbs  0 oz    Constitutional: No acute distress, laying in bed, breathing easily  Eyes: normal sclera.  Pupils equal round reactive to light and accommodating normal sclera, extraocular eye motions intact  HEENT: Normal oral pharynx  Respiratory: Bilateral wheezing expiratory diffuse.  No coughing.  No crackles  Cardiovascular: Regular rate and rhythm no rubs gallops or murmurs appreciated  GI: Soft nontender nondistended no pedal splenomegaly.  No CVA tenderness  Lymph/Hematologic: No cervical lymphadenopathy  Genitourinary: No discomfort over the bladder.  Skin: No rash.  She has mild lymphedema.  No pitting edema in lower extremities.  No cyanosis.  Musculoskeletal: No focal joint swelling erythema.  No pain with range of motion of major joints upper and lower extremities.  No spinal tenderness  Neurologic: Cranial nerves II through XII grossly intact, normal speech mentation, strength 5 of 5 in extremities x4  Psychiatric: Calm and cooperative.  Pleasant, normal affect    Data   Data reviewed today:  I personally reviewed upper extremities and EKG performed the emergency room.  EKG shows normal sinus rhythm.  Normal intervals.  T wave inversion V1 to 3.  Recent Labs   Lab 09/18/22  1830   WBC 6.5   HGB 13.3   MCV 99      INR 1.01      POTASSIUM 4.2   CHLORIDE 106   CO2 28   BUN 16   CR 0.85   ANIONGAP 6   ELY 9.1   *       Imaging:  No results found for this or any previous visit (from the past 24 hour(s)).

## 2022-09-20 ENCOUNTER — RESEARCH ENCOUNTER (OUTPATIENT)
Dept: CARDIOLOGY | Facility: CLINIC | Age: 72
End: 2022-09-20

## 2022-09-20 ENCOUNTER — APPOINTMENT (OUTPATIENT)
Dept: OCCUPATIONAL THERAPY | Facility: CLINIC | Age: 72
DRG: 247 | End: 2022-09-20
Attending: INTERNAL MEDICINE
Payer: COMMERCIAL

## 2022-09-20 ENCOUNTER — APPOINTMENT (OUTPATIENT)
Dept: CT IMAGING | Facility: CLINIC | Age: 72
DRG: 247 | End: 2022-09-20
Attending: PHYSICIAN ASSISTANT
Payer: COMMERCIAL

## 2022-09-20 VITALS
BODY MASS INDEX: 33.77 KG/M2 | TEMPERATURE: 98.4 F | HEART RATE: 73 BPM | HEIGHT: 60 IN | OXYGEN SATURATION: 95 % | WEIGHT: 172 LBS | SYSTOLIC BLOOD PRESSURE: 115 MMHG | RESPIRATION RATE: 18 BRPM | DIASTOLIC BLOOD PRESSURE: 82 MMHG

## 2022-09-20 DIAGNOSIS — I21.4 NSTEMI (NON-ST ELEVATED MYOCARDIAL INFARCTION) (H): Primary | ICD-10-CM

## 2022-09-20 DIAGNOSIS — Z00.6 EXAMINATION OF PARTICIPANT OR CONTROL IN CLINICAL RESEARCH: ICD-10-CM

## 2022-09-20 LAB
ANION GAP SERPL CALCULATED.3IONS-SCNC: 7 MMOL/L (ref 3–14)
BACTERIA UR CULT: NO GROWTH
BUN SERPL-MCNC: 15 MG/DL (ref 7–30)
CALCIUM SERPL-MCNC: 8.8 MG/DL (ref 8.5–10.1)
CHLORIDE BLD-SCNC: 110 MMOL/L (ref 94–109)
CO2 SERPL-SCNC: 22 MMOL/L (ref 20–32)
CREAT SERPL-MCNC: 0.63 MG/DL (ref 0.52–1.04)
GFR SERPL CREATININE-BSD FRML MDRD: >90 ML/MIN/1.73M2
GLUCOSE BLD-MCNC: 98 MG/DL (ref 70–99)
MAGNESIUM SERPL-MCNC: 2.2 MG/DL (ref 1.6–2.3)
POTASSIUM BLD-SCNC: 4.3 MMOL/L (ref 3.4–5.3)
SODIUM SERPL-SCNC: 139 MMOL/L (ref 133–144)

## 2022-09-20 PROCEDURE — 250N000013 HC RX MED GY IP 250 OP 250 PS 637: Performed by: INTERNAL MEDICINE

## 2022-09-20 PROCEDURE — 83735 ASSAY OF MAGNESIUM: CPT | Performed by: INTERNAL MEDICINE

## 2022-09-20 PROCEDURE — 80048 BASIC METABOLIC PNL TOTAL CA: CPT | Performed by: INTERNAL MEDICINE

## 2022-09-20 PROCEDURE — 94640 AIRWAY INHALATION TREATMENT: CPT | Mod: 76

## 2022-09-20 PROCEDURE — 93010 ELECTROCARDIOGRAM REPORT: CPT | Performed by: INTERNAL MEDICINE

## 2022-09-20 PROCEDURE — 97535 SELF CARE MNGMENT TRAINING: CPT | Mod: GO | Performed by: OCCUPATIONAL THERAPIST

## 2022-09-20 PROCEDURE — 999N000157 HC STATISTIC RCP TIME EA 10 MIN

## 2022-09-20 PROCEDURE — 250N000011 HC RX IP 250 OP 636: Performed by: INTERNAL MEDICINE

## 2022-09-20 PROCEDURE — 94640 AIRWAY INHALATION TREATMENT: CPT

## 2022-09-20 PROCEDURE — 93005 ELECTROCARDIOGRAM TRACING: CPT

## 2022-09-20 PROCEDURE — 97110 THERAPEUTIC EXERCISES: CPT | Mod: GO | Performed by: OCCUPATIONAL THERAPIST

## 2022-09-20 PROCEDURE — 97165 OT EVAL LOW COMPLEX 30 MIN: CPT | Mod: GO | Performed by: OCCUPATIONAL THERAPIST

## 2022-09-20 PROCEDURE — 250N000009 HC RX 250: Performed by: INTERNAL MEDICINE

## 2022-09-20 PROCEDURE — 99239 HOSP IP/OBS DSCHRG MGMT >30: CPT | Performed by: INTERNAL MEDICINE

## 2022-09-20 PROCEDURE — 36415 COLL VENOUS BLD VENIPUNCTURE: CPT | Performed by: INTERNAL MEDICINE

## 2022-09-20 PROCEDURE — 99221 1ST HOSP IP/OBS SF/LOW 40: CPT | Performed by: PHYSICIAN ASSISTANT

## 2022-09-20 PROCEDURE — 74178 CT ABD&PLV WO CNTR FLWD CNTR: CPT

## 2022-09-20 PROCEDURE — 99233 SBSQ HOSP IP/OBS HIGH 50: CPT | Mod: FS | Performed by: INTERNAL MEDICINE

## 2022-09-20 RX ORDER — IOPAMIDOL 755 MG/ML
84 INJECTION, SOLUTION INTRAVASCULAR ONCE
Status: COMPLETED | OUTPATIENT
Start: 2022-09-20 | End: 2022-09-20

## 2022-09-20 RX ORDER — NALOXONE HYDROCHLORIDE 0.4 MG/ML
0.4 INJECTION, SOLUTION INTRAMUSCULAR; INTRAVENOUS; SUBCUTANEOUS
Status: DISCONTINUED | OUTPATIENT
Start: 2022-09-20 | End: 2022-09-20 | Stop reason: HOSPADM

## 2022-09-20 RX ORDER — NITROGLYCERIN 0.4 MG/1
TABLET SUBLINGUAL
Qty: 30 TABLET | Refills: 0 | Status: SHIPPED | OUTPATIENT
Start: 2022-09-20 | End: 2022-11-16

## 2022-09-20 RX ORDER — NALOXONE HYDROCHLORIDE 0.4 MG/ML
0.2 INJECTION, SOLUTION INTRAMUSCULAR; INTRAVENOUS; SUBCUTANEOUS
Status: DISCONTINUED | OUTPATIENT
Start: 2022-09-20 | End: 2022-09-20 | Stop reason: HOSPADM

## 2022-09-20 RX ADMIN — IOPAMIDOL 84 ML: 755 INJECTION, SOLUTION INTRAVENOUS at 11:45

## 2022-09-20 RX ADMIN — IPRATROPIUM BROMIDE AND ALBUTEROL SULFATE 3 ML: .5; 3 SOLUTION RESPIRATORY (INHALATION) at 11:12

## 2022-09-20 RX ADMIN — METOPROLOL SUCCINATE 25 MG: 25 TABLET, EXTENDED RELEASE ORAL at 08:26

## 2022-09-20 RX ADMIN — IPRATROPIUM BROMIDE AND ALBUTEROL SULFATE 3 ML: .5; 3 SOLUTION RESPIRATORY (INHALATION) at 07:44

## 2022-09-20 RX ADMIN — TICAGRELOR 90 MG: 90 TABLET ORAL at 12:35

## 2022-09-20 RX ADMIN — LEVOTHYROXINE SODIUM 75 MCG: 75 TABLET ORAL at 08:26

## 2022-09-20 RX ADMIN — ONDANSETRON 4 MG: 2 INJECTION INTRAMUSCULAR; INTRAVENOUS at 00:55

## 2022-09-20 RX ADMIN — ASPIRIN 81 MG: 81 TABLET, COATED ORAL at 08:26

## 2022-09-20 RX ADMIN — SODIUM CHLORIDE 64 ML: 9 INJECTION, SOLUTION INTRAVENOUS at 11:46

## 2022-09-20 ASSESSMENT — ACTIVITIES OF DAILY LIVING (ADL)
PREVIOUS_RESPONSIBILITIES: MEAL PREP;HOUSEKEEPING;LAUNDRY;SHOPPING;YARDWORK;MEDICATION MANAGEMENT;FINANCES;DRIVING;CHILD CARE
ADLS_ACUITY_SCORE: 22
ADLS_ACUITY_SCORE: 22
ADLS_ACUITY_SCORE: 26
ADLS_ACUITY_SCORE: 22
IADL_COMMENTS: PT SPOUSE ABLE TO ASSIST AS NEEDED
ADLS_ACUITY_SCORE: 22
ADLS_ACUITY_SCORE: 26
ADLS_ACUITY_SCORE: 22
ADLS_ACUITY_SCORE: 26
ADLS_ACUITY_SCORE: 22

## 2022-09-20 NOTE — PLAN OF CARE
PT: Orders received. Chart reviewed and discussed with care team.  PT not indicated due to patient mobilizing near baseline and appropriate to discharge home, being followed by OT. Per patient report, was getting ready to start outpatient PT for knee prior to admit; recommend patient start outpatient PT at discharge.  Defer discharge recommendations to OT.  Will complete orders.

## 2022-09-20 NOTE — PROGRESS NOTES
[facility]  Cardiology Progress Note  Date of Service: 09/20/2022  Primary Cardiologist: will be SILVERIO Boss    Assessment & Plan   Ms. Mckeon is a 72 year old woman with PMH significant for asthma, bronchiectasis, LAILA on CPAP who presents for the evaluation of chest discomfort and abdominal discomfort and shortness of breath.    Assessment:  1.  NSTEMI with trop 0.078 at [facility], neg here, EF 55% with base-mid inferolateral, apical lateral, mid-apical inferior hypokinesis   - s/p KELVIN to prox LAD for 99% lesion, KELVIN to prox-RCA for 90% lesion with D1 at 60%    2.  SVT, noted over the wknd, bb started here for stenting will help with svt as well.  If recurring will send to EP.  None seen on tele    3.  Asthma exacerbation:  Improved on DUO Nebs.    4.  LAILA:  On CPAP    5.  Left hydronephrosis, bilateral renal stones:  On empiric rocephin.      6.  Mild to moderate TR, ,will follow with serial echos    Plan:   1. Continue current medications, pt willing to retry metoprolol.  Unlikely that HR increased from metoprolol likley coincidental.    2. Dual antiplatelet uninterrupted for 1 year reinforced.   3. Intpt/ outpt cardiac rehab  4. pls send on prn on nitroglycerin  5. Will arrange f/u in clinic in [place]  6. Ok to discharge today from a cardiac standpoint.      Keiko Will PA-C  [facility]  Pager: [phone]     I spent 45 minutes face-to-face and/or coordinating care of Edda Mckeon. Over 50% of our time on the unit was spent counseling the patient and/or coordinating care regarding her cardiac condition.    Interval History   Had an episode of vomiting overnight, still feels weak.  No significant change, but no cp or sob.  Some flank pain, noted, urology involved.  Radial access site minimally tender.  Pt notes she's tried metoprolol in the past and perhaps didn't feel well on it stating her HR increased.  Multiple questions answered.      Physical Exam   Temp: 98.2  F (36.8  C) Temp src:  Oral BP: 105/60 Pulse: 75   Resp: 18 SpO2: 96 % O2 Device: None (Room air)    Vitals:    09/18/22 2243 09/19/22 0640 09/20/22 0618   Weight: 77.2 kg (170 lb 1.6 oz) 77.1 kg (170 lb) 78 kg (172 lb)   Elderly woman in NAD.  Heart is RRR without murmur rub or gallop.  Lungs CTAB.  Extremities without edema.  Rt radial site with large area ecchymosis ~ 7 cm x 4 cm.      Clinically Significant Risk Factors Present on Admission            # Obesity: Estimated body mass index is 33.59 kg/m  as calculated from the following:    Height as of this encounter: 1.524 m (5').    Weight as of this encounter: 78 kg (172 lb).  SVT  Hydronephrosis of kidney  Bronchietasis, asthma, LAILA      Medications     - MEDICATION INSTRUCTIONS -       - MEDICATION INSTRUCTIONS -       - MEDICATION INSTRUCTIONS -       - MEDICATION INSTRUCTIONS -       - MEDICATION INSTRUCTIONS -       - MEDICATION INSTRUCTIONS -       Percutaneous Coronary Intervention orders placed (this is information for BPA alerting)       ACE/ARB/ARNI NOT PRESCRIBED         aspirin  81 mg Oral Daily     cefTRIAXone  1 g Intravenous Q24H     ipratropium - albuterol 0.5 mg/2.5 mg/3 mL  3 mL Nebulization Q4H     levothyroxine  75 mcg Oral QAM AC     metoprolol succinate ER  25 mg Oral Daily     mometasone  2 puff Inhalation BID     rosuvastatin  40 mg Oral QPM     sodium chloride (PF)  3 mL Intracatheter Q8H     ticagrelor  90 mg Oral Q12H       Data   Last 24 hours labs reviewed     Tele: sinus

## 2022-09-20 NOTE — PROGRESS NOTES
UROLOGY    No hydronephrosis on CTU. Follow-up with primary urologist on Thurs as scheduled.     Lucia Hammond PA-C  [facility]  [phone]  Securely message with the Vocera Web Console   Monday-Wednesday and Friday

## 2022-09-20 NOTE — CONSULTS
"NUTRITION EDUCATION    REASON FOR ASSESSMENT:  Nutrition education on American Heart Association (AHA) Heart Healthy Diet.    Nutrition Admission Risk Screen Received -   Have you recently lost weight without trying in the last 6 months ? - \"unsure\"  Have you been eating poorly due to a decreased appetite ?- \"no\"    Wt Readings from Last 10 Encounters:   09/20/22 78 kg (172 lb)   06/11/21 74.8 kg (165 lb)   11/11/19 79.4 kg (175 lb)   11/26/18 78 kg (172 lb)   10/22/18 76.7 kg (169 lb 3.2 oz)   - No wt loss indicated. Does not meet criteria for full assessment at this time.     NUTRITION HISTORY:  Information obtained from patient.   - Somewhat familiar with a mediterranean style eating pattern.   - Eats 1-2 eggs daily. Enjoys fruits, veggies, lean proteins, heart healthy fats.  - no issues with appetite or unintended wt loss at home.     CURRENT DIET ORDER:  Low sat fat, Na <2400 mg     NUTRITION DIAGNOSIS:  Food- and nutrition-related knowledge deficit R/t heart healthy diet AEB patient reports some familiarity but interested in education session today.     INTERVENTIONS:  Nutrition Prescription:    Recommended AHA Heart Healthy Diet    Implementation:     Nutrition Education (Content):  a) reviewed Heart Healthy Diet guidelines  b) provided Mediterranean diet nutrition therapy    Nutrition Education (Application):  a) Discussed current eating habits and recommended alternative food choices    Anticipate good compliance    Diet Education - refer to Education flowsheet    Goals:    Patient verbalizes understanding of diet     All of the above goals met during education session    Follow Up/Monitoring:    Provided RD contact information for future questions    Will recheck at LOS if still admitted       Maru Ramsey RD, LD  [facility], [facility]  Pager: [phone]  Weekend Pager: [phone]    "

## 2022-09-20 NOTE — PROGRESS NOTES
Patient on comfort cares. Patient is having pain. tylenol given for pain with relief. Turn and Repro patient every 2 hours. Patient resting comfortably. Patient denied today for inpatient hospice. Inpatient Hospice will reevaluate patient tomorrow. New dressing placed on wound on coccyx.     Family upset that patient would not be able to stay at hospital if continues to be denied by Inpatient hospice. Family educated on reasons for Inpatient hospice and why she is currently not a candidate. Family educated on hospice options in the community but continue to be resistant.      TR band removed at 1900, patient developed hematoma, TR band replaced with 10 ml air after applying pressure for 15 minutes, continue to monitor.

## 2022-09-20 NOTE — PLAN OF CARE
Hematoma presented on R arm post cath. WDL, warm, and soft. CPAP @ night. Rocephin infusion complete. Nausea reported by patient and treated with PRN zofran. No signs or reports of R arm pain, SOB, or CP. Plan to speak with Urology today.

## 2022-09-20 NOTE — PROGRESS NOTES
09/20/22 1000   Quick Adds   Type of Visit Initial Occupational Therapy Evaluation   Living Environment   Current Living Arrangements house   Home Accessibility stairs to enter home;stairs within home   Number of Stairs, Main Entrance 10   Number of Stairs, Within Home, Primary greater than 10 stairs   Stair Railings, Within Home, Primary railings safe and in good condition   Living Environment Comments Pt has a ramp into her home. Pt has bed/bath on main level, office upstairs. Pt has a disabled daughter who she cares for   Self-Care   Usual Activity Tolerance moderate   Current Activity Tolerance fair   Equipment Currently Used at Home walker, rolling   Fall history within last six months no   Activity/Exercise/Self-Care Comment Pt reports independence with ADLS and care for her daughter at baseline, chronic knee pain   Instrumental Activities of Daily Living (IADL)   Previous Responsibilities meal prep;housekeeping;laundry;shopping;yardwork;medication management;finances;driving;   IADL Comments Pt spouse able to assist as needed   General Information   Onset of Illness/Injury or Date of Surgery 09/18/22   Referring Physician Rozina Tafoya MD   Patient/Family Therapy Goal Statement (OT) Patient would like to return home   Additional Occupational Profile Info/Pertinent History of Current Problem 72 year old woman with PMH significant for asthma, bronchiectasis, LAILA on CPAP who presents for the evaluation of chest discomfort and abdominal discomfort and shortness of breath   Existing Precautions/Restrictions fall;cardiac   Cognitive Status Examination   Orientation Status orientation to person, place and time   Cognitive Status Comments Pt appears cognitively intact with good education recall   Sensory   Sensory Quick Adds No deficits were identified   Pain Assessment   Patient Currently in Pain Yes, see Vital Sign flowsheet   Integumentary/Edema   Integumentary/Edema no deficits were identifed    Posture   Posture not impaired   Range of Motion Comprehensive   Comment, General Range of Motion Impaired knee flexion LLE which pt reports is chronic   Strength Comprehensive (MMT)   Comment, General Manual Muscle Testing (MMT) Assessment Decreased activity tolerance   Coordination   Coordination Comments Intact   Bed Mobility   Comment (Bed Mobility) SBA   Transfers   Transfer Comments CGA   Balance   Balance Comments Intact with walker   Activities of Daily Living   BADL Assessment/Intervention bathing;toileting   Bathing Assessment/Intervention   Comment, (Bathing) Decrease activity tolerance for bathing   Toileting   Comment, (Toileting) Decreased activity tolerance for toileting   Clinical Impression   Criteria for Skilled Therapeutic Interventions Met (OT) Yes, treatment indicated   OT Diagnosis Decline in activity tolerance   Influenced by the following impairments NSTEMI   OT Problem List-Impairments impacting ADL problems related to;activity tolerance impaired;post-surgical precautions;pain   Assessment of Occupational Performance 1-3 Performance Deficits   Identified Performance Deficits Decreased tolerance for bathing and toielting   Planned Therapy Interventions (OT) home program guidelines;progressive activity/exercise;risk factor education   Clinical Decision Making Complexity (OT) low complexity   Anticipated Equipment Needs Upon Discharge (OT)   (Pt owns all necessary AE)   Risk & Benefits of therapy have been explained evaluation/treatment results reviewed;care plan/treatment goals reviewed;risks/benefits reviewed;current/potential barriers reviewed;participants voiced agreement with care plan;participants included;patient   OT Discharge Planning   OT Discharge Recommendation (DC Rec) home with assist;home with outpatient cardiac rehab   OT Rationale for DC Rec Patient appears safe to discharge home with intermittent assist from spouse for higher level IADLs. Patient would benefit from continued  CR in outpatient setting to progress to prior level of function   OT Brief overview of current status CGA mobility   Total Evaluation Time (Minutes)   Total Evaluation Time (Minutes) 10   OT Goals   Therapy Frequency (OT) 2 times/day   OT Goals Cardiac Phase 1   OT: Understanding of cardiac education to maximize quality of life, condition management, and health outcomes Patient;Verbalize;Demonstrate;Goal Met   OT: Perform aerobic activity with stable cardiovascular response continuous;10 minutes;ambulation   OT: Functional/aerobic ambulation tolerance with stable cardiovascular response in order to return to home and community environment Supervision/SBA;Standard walker;200 feet;Goal Met   OT: Navigation of stairs simulating home set up with stable cardiovascular response in order to return to home and community environment Supervision/SBA;10 stairs;Rail on both sides;Goal Met

## 2022-09-20 NOTE — PROGRESS NOTES
"SPIRITUAL HEALTH SERVICES  SPIRITUAL ASSESSMENT Progress Note  [facility]     REFERRAL SOURCE: follow up    Reviewed documentation. Reflective conversation shared with Edda which integrated elements of illness and family narratives.     Edda shared that \"things are moving in the right direction\" following yesterday's angiogram and she's hopeful about being able to return home later today or tomorrow, depending on the results of \"a kidney scan.\"   Edda expressed gratitude for the care she's received during her hospitalization.   She requested a prayer before eating lunch and \"getting some rest.\"    Offered assurance through prayer which incorporated conversational themes.    PLAN: [facility] remains available for support.    Shamika Jha  Associate   Pager [phone]  Phone [phone]    "

## 2022-09-20 NOTE — DISCHARGE SUMMARY
[facility]    Discharge Summary  Hospitalist    Date of Admission:  9/18/2022  Date of Discharge:  9/20/2022  6:00 PM  Discharging Provider: Alma Shaffer MD, MD    Discharge Diagnoses   Unstable angina    History of Present Illness   Please review admission history and physical.    Hospital Course   Edda Mckeon was admitted on 9/18/2022.  The following problems were addressed during her hospitalization:    Principal Problem:    Unstable angina (H)  Edda Mckeon is a 72 year old female with history of asthma, history of bronchiectasis, LAILA on CPAP mitral regurgitation, history moderate to severe mitral annular calcification with moderate MR, dyslipidemia palpitations 2021 with Zio patch showing short burst of SVT longest 20 beats who presents with chest pain.  Patient presents with new chest pain over the last several days.  nstemi  Unstable angina  Dyslipidemia  Hypertension  Patient presents with exertional chest pain, radiating down her arms, resolved with nitroglycerin in the emergency department, mild troponin elevation noted in the ED.  Patient she was to [place] emergency department and left against advice on 9/17.Echo shows wall motion abnormalities, EKG had T wave inversions V1-V3, patient does have reactive airway disease.  Seen by cardiology services, continue aspirin, metoprolol, heparin drip initiated in the emergency department, patient underwent angiogram the day prior to discharge, she was found to have severe two-vessel disease, underwent stents to LAD and RCA.  Outpatient follow-up as per cardiology.  --TSH is within normal limits, procalcitonin negative, .    h/o of asthma  Asthma exacerbation  -Asthma next controller inhaler as needed albuterol.  She has a history of bronchiectasis.  -CT chest PE protocol at outside ER yesterday without pulmonary emboli.  Lungs clear.  --Patient refused prednisone, continue duo nebs every 4 hours, wean down oxygen as  possible, encourage incentive spirometry use, continue as needed albuterol.  Hold antibiotics as CT chest does not indicate any sign of infiltrate.  Obstructive sleep apnea  Continue PTA CPAP  Moderate MR  Lower extremity edema  Chronic  diastolic heart failure  Grade 1 diastolic dysfunction  -Echocardiogram June 2021 showed normal EF 55 to 60%.  Diastolic dysfunction not assessed.  Normal LV size and thickness.  Moderate to severe mitral annular calcification.  1+ mitral regurgitation.  Thought to have moderate MR per Dr. Newell her cardiologist.She has some lymphedema but no pitting edema in her lower extremities BNP is normal.  Lung findings are likely related to asthma exacerbation    Echocardiogram completed, continue as needed Lasix.    Left hydronephrosis  Microscopic hematuria, abnormal urinalysis 9/14  Possible UTI  Bilateral renal stones  --Patient notes 2 to 3 months of left-sided upper quadrant pain, renal ultrasound done through PCP indicates bilateral renal stones and left-sided hydronephrosis.Urinalysis shows microscopic hematuria with white cells, started on antibiotics-Rocephin, antibiotics discontinued once cultures were negative, patient was seen by urology service, they ordered CT of the abdomen, did not indicate any hydronephrosis they recommended follow-up with outpatient urology.        Alma Shaffer MD    Significant Results and Procedures     Pending Results     Unresulted Labs Ordered in the Past 30 Days of this Admission     No orders found from 8/19/2022 to 9/19/2022.          Code Status   Full Code       Primary Care Physician   Emi Monk    Physical Exam   Temp: 98.1  F (36.7  C) Temp src: Oral BP: 110/50 Pulse: 68   Resp: 18 SpO2: 98 % O2 Device: None (Room air)    Vitals:    09/18/22 2243 09/19/22 0640 09/20/22 0618   Weight: 77.2 kg (170 lb 1.6 oz) 77.1 kg (170 lb) 78 kg (172 lb)     Vital Signs with Ranges  Temp:  [97.5  F (36.4  C)-98.8  F (37.1  C)] 98.1  F (36.7   C)  Pulse:  [50-75] 68  Resp:  [16-18] 18  BP: (105-133)/(42-84) 110/50  SpO2:  [94 %-98 %] 98 %  I/O last 3 completed shifts:  In: -   Out: 300 [Urine:300]    The patient was examined on the day of discharge.    Discharge Disposition   Discharged to home  Condition at discharge: Stable    Consultations This Hospital Stay   PHARMACY IP CONSULT  PHARMACY IP CONSULT  CARDIOLOGY IP CONSULT  PHYSICAL THERAPY ADULT IP CONSULT  OCCUPATIONAL THERAPY ADULT IP CONSULT  CARDIAC REHAB IP CONSULT  UROLOGY IP CONSULT  PHARMACY IP CONSULT  PHARMACY IP CONSULT  NUTRITION SERVICES ADULT IP CONSULT  CARDIAC REHAB IP CONSULT  PHARMACY IP CONSULT  VASCULAR ACCESS ADULT IP CONSULT  SMOKING CESSATION PROGRAM IP CONSULT  SMOKING CESSATION PROGRAM IP CONSULT    Time Spent on this Encounter   IAlma MD, personally saw the patient today and spent greater than 30 minutes discharging this patient.    Discharge Orders      CARDIAC REHAB REFERRAL      Cardiac Rehab Referral      Follow-Up with Cardiology MATEO      Medication Instructions - Anticoagulants    Do NOT stop your aspirin or platelet inhibitor unless directed by your Cardiologist.  These medications help to prevent platelets in your blood from sticking together and forming a clot.  Examples of these medications are:  Ticagrelor (Brilinta), Clopidigrel (Plavix), Prasugrel (Effient)     When to call - Contact the Heart Clinic    You may experience symptoms that require follow-up before your scheduled appointment. Contact the Heart Clinic if you develop: Fever over 100.4o Fahrenheit, that lasts more than one day; Redness, heat, or pus at the puncture site; Change in color or temperature in your hand or arm.     When to call - Reasons to Call 911    If your wrist puncture site starts bleeding after discharge, sit down and apply firm pressure with your thumb against the puncture site and fingers against the back of the wrist for 10 minutes. If the bleeding stops, continue to  rest, keeping your wrist still for 2 hours. Notify your doctor as soon as possible.  IF BLEEDING DOES NOT STOP OR THERE IS A LARGER AMOUNT OF BLEEDING OR SPURTING CALL 9-1-1 immediately.DO NOT drive yourself to the hospital.     Precautions - Lifting    DO NOT lift more than 5 pounds with affected arm for 48 hours     Precautions - Household Activities    Avoid excessive bending or movement of your wrist for 72 hours.  Do not subject hand/arm to any forceful movements for 24 hours, such as supporting weight when rising from a chair or bed.     Remove the band-aid on the puncture site after 24 hours and leave open to air. If minor oozing, you may apply a band-aid and remove after 12 hours.     Precautions - Active Sports Activities    DO NOT engage in vigorous exercise using your affected arm for 3 days after discharge.  This includes golf, tennis or swimming.     Precautions - Operating yard equipment or vehicles    Do not operate a chainsaw, lawnmower, motorcycle, or all-terrain vehicle for 48 hours after the procedure.     Precautions - Elective Dental Work    NO elective dental work for 6 weeks after receiving a stent.     Comfort and Pain Management - Bruising after Surgery    Expect mild tingling of hand and tenderness at the wrist puncture site for up to 3 days. You may take Tylenol or a pain medicine recommended by your doctor.     Activity - Cardiac Rehab    You are encouraged to enroll in an Outpatient Cardiac Rehab program after discharge from the hospital.  Our Cardiac Rehab staff may visit briefly with you while you're in the hospital.  If they miss you, someone will contact you after you are home.     Return to Driving    Driving is NOT permitted for 24 hours after surgery     Return to work    You may return to work after 72 hours if you are feeling well and your job does not involve heavy lifting.     Shower / Bathing    You may shower on the day after your procedure.  DO NOT soak of wrist with the  puncture site in water for 3 days to prevent infection. DO NOT take a tub bath or wash dishes for 3 days after the procedure     Dressing Removal    Remove the band-aid on the puncture site after 24 hours and leave open to air. If minor oozing, you may apply a band-aid and remove after 12 hours     Reason Lipid Lowering Medications not prescribed from this order set    Per hospital team. Ordered as inpatient med     Reason for your hospital stay    Chest pain     Follow-up and recommended labs and tests     Follow up with primary care provider, Emi Monk, within 7 days for hospital follow- up.  No follow up labs or test are needed.  Follow-up with cardiology as recommended.     Activity    Your activity upon discharge: activity as tolerated     Diet    Follow this diet upon discharge: Orders Placed This Encounter      Low Saturated Fat Na <2400 mg     Discharge Medications   Current Discharge Medication List      START taking these medications    Details   aspirin (ASA) 81 MG chewable tablet Take 1 tablet (81 mg) by mouth daily Starting tomorrow.  Qty: 30 tablet, Refills: 3    Associated Diagnoses: NSTEMI (non-ST elevated myocardial infarction) (H)      nitroGLYcerin (NITROSTAT) 0.4 MG sublingual tablet For chest pain place 1 tablet under the tongue every 5 minutes for 3 doses. If symptoms persist 5 minutes after 1st dose call 911.  Qty: 30 tablet, Refills: 0    Associated Diagnoses: NSTEMI (non-ST elevated myocardial infarction) (H)      ticagrelor (BRILINTA) 90 MG tablet Take 1 tablet (90 mg) by mouth every 12 hours  Qty: 60 tablet, Refills: 1    Associated Diagnoses: NSTEMI (non-ST elevated myocardial infarction) (H)         CONTINUE these medications which have NOT CHANGED    Details   albuterol (PROAIR HFA/PROVENTIL HFA/VENTOLIN HFA) 108 (90 Base) MCG/ACT inhaler Inhale 1-2 puffs into the lungs every 6 hours as needed for shortness of breath / dyspnea or wheezing      Cholecalciferol (VITAMIN D) 2000  units tablet Take 2,000 Units by mouth daily      coenzyme Q-10 (CO-Q10) 50 MG capsule Take 50 mg by mouth daily      fish oil-omega-3 fatty acids 1000 MG capsule Take 2 g by mouth every other day      Gluc-Chonn-MSM-Boswellia-Vit D (GLUCOS CHONDROIT, BOSWELLIA,) TABS Take by mouth daily      levothyroxine (SYNTHROID/LEVOTHROID) 75 MCG tablet Take 75 mcg by mouth daily      metoprolol succinate ER (TOPROL-XL) 25 MG 24 hr tablet Take 1 tablet (25 mg) by mouth daily  Qty: 90 tablet, Refills: 3    Associated Diagnoses: Paroxysmal supraventricular tachycardia (H)      mometasone furoate (ASMANEX HFA) 200 MCG/ACT inhaler Inhale 2 puffs into the lungs 2 times daily      Multiple vitamin TABS Take 1 tablet by mouth daily      PAPAYA ENZYME PO Take by mouth daily      polyvinyl alcohol (LIQUIFILM TEARS) 1.4 % ophthalmic solution Place 2 drops into both eyes as needed for dry eyes      rosuvastatin (CRESTOR) 40 MG tablet Take 40 mg by mouth daily      Turmeric 450 MG CAPS Take 1 capsule by mouth 2 times daily      order for DME Oxygen for home use. 2 liters per [place] during daytime, 4 at night. Frequency: Continuous with portability.; Length of need: 99  Months.         STOP taking these medications       aspirin 81 MG tablet Comments:   Reason for Stopping:             Allergies   Allergies   Allergen Reactions     Atorvastatin      Muscle aches     Hmg-Coa-R Inhibitors      Muscle aches     Pravastatin      Muscle aches     Scopolamine Nausea and Vomiting and Swelling     Data   Most Recent 3 CBC's:Recent Labs   Lab Test 09/19/22  0420 09/18/22  1830 11/11/19  1542   WBC 5.9 6.5 6.5   HGB 12.3 13.3 13.3   * 99 101*    244 263      Most Recent 3 BMP's:  Recent Labs   Lab Test 09/20/22  0627 09/19/22  0420 09/18/22  1830    143 140   POTASSIUM 4.3 3.5 4.2   CHLORIDE 110* 107 106   CO2 22 28 28   BUN 15 16 16   CR 0.63 0.72 0.85   ANIONGAP 7 8 6   ELY 8.8 8.7 9.1   GLC 98 107* 137*     Most Recent 2  LFT's:  Recent Labs   Lab Test 09/19/22  0420   AST 18   ALT 16   ALKPHOS 84   BILITOTAL 0.3     Most Recent INR's and Anticoagulation Dosing History:  Anticoagulation Dose History     Recent Dosing and Labs Latest Ref Rng & Units 9/18/2022    INR 0.85 - 1.15 1.01        Most Recent 3 Troponin's:No lab results found.  Most Recent Cholesterol Panel:  Recent Labs   Lab Test 09/19/22  0420   CHOL 159   LDL 71   HDL 76   TRIG 58     Most Recent 6 Bacteria Isolates From Any Culture (See EPIC Reports for Culture Details):No lab results found.  Most Recent TSH, T4 and A1c Labs:  Recent Labs   Lab Test 09/18/22  2308   TSH 3.05     Results for orders placed or performed during the hospital encounter of 09/18/22   CT Urogram wo & w Contrast    Narrative    CT UROGRAM WO & W CONTRAST 9/20/2022 12:17 PM    CLINICAL HISTORY: left flank pain, hematuria, hx of stones    TECHNIQUE: CT scan of the abdomen and pelvis was performed following  injection of IV contrast. Multiplanar reformats were obtained. Dose  reduction techniques were used.  CONTRAST: 84 mL Isovue-[id number]    COMPARISON: None.    FINDINGS:   LOWER CHEST: Small hiatal hernia. Visualized lung bases are clear.    HEPATOBILIARY: Normal.    PANCREAS: Normal.    SPLEEN: Normal.    ADRENAL GLANDS: Normal.    KIDNEYS/BLADDER: Excreted contrast in the kidneys from the coronary  angiogram earlier today limits evaluation for calculi. Few parapelvic  renal cysts requiring no specific follow-up. No suspicious renal  masses in either kidney. No hydronephrosis. No filling defects in the  renal collecting system and ureters bilaterally. Partially distended  urinary bladder with few bladder wall trabeculations. No focal filling  defects in the urinary bladder lumen.    BOWEL: Diverticulosis in the colon. No acute inflammatory change. No  obstruction.     PELVIC ORGANS: Few small calcifications in the central uterus with  mild hypodensity near the fundus. No adnexal masses.    ADDITIONAL  FINDINGS: No free fluid or fluid collections. No abdominal  aortic aneurysm. No lymphadenopathy.    MUSCULOSKELETAL: No suspicious lesions in the bones.      Impression    IMPRESSION:   1.  Evaluation for calculi is limited by excreted contrast in the  kidneys from the coronary angiogram yesterday.  2.  No hydronephrosis, suspicious renal masses or filling defects in  the renal collecting system and ureters.  3.  Urinary bladder wall trabeculations.  4.  Mild hypodensity in the central uterus with punctate  calcifications. Nonemergent ultrasound can be considered for complete  assessment.    PAXTON SANCHES MD         SYSTEM ID:  [id number]   Echocardiogram Complete     Value    LVEF  55-60%    Narrative    [id number]  [id number]  [id number]  [id number]^ROBERT^PETER^FRANCISCA     [facility]  Echocardiography Laboratory  [address] [place] [address]     Name: MIGUEL MORRISSEY  MRN: [id number]  : 1950  Study Date: 2022 07:38 AM  Age: 72 yrs  Gender: Female  Patient Location: [facility]  Reason For Study: Chest Pain, Chest Tightness, Chest Pressure  Ordering Physician: LEE JONES  Performed By: Libby Beasley     BSA: 1.7 m2  Height: 60 in  Weight: 170 lb  HR: 60  BP: 120/78 mmHg  ______________________________________________________________________________  Procedure  Complete Portable Echo Adult. Optison (NDC #[id number]) given intravenously.  ______________________________________________________________________________  Interpretation Summary     Left ventricular systolic function is normal.  The visual ejection fraction is 55-60%.  Base-mid inferolateral, apical lateral, mid-apical inferior hypokinesis.  Grade I diastolic dysfunction.  The right ventricular systolic function is normal.  The right ventricular systolic function is normal.  Mild-moderate TR.  The inferior vena cava is normal.  There is no pericardial effusion.     Compared to prior study dated 2021, there are wall  motion abnormalities  evident as described above.  ______________________________________________________________________________  Left Ventricle  The left ventricle is normal in size. Proximal septal thickening is noted.  There is normal left ventricular wall thickness. Left ventricular systolic  function is normal. The visual ejection fraction is 55-60%. Grade I or early  diastolic dysfunction. Base-mid inferolateral, apical lateral, mid-apical  inferior hypokinesis.     Right Ventricle  The right ventricle is normal size. The right ventricular systolic function is  normal.     Atria  Normal left atrial size. Right atrial size is normal.     Mitral Valve  There is moderate mitral annular calcification. There is trace mitral  regurgitation.     Tricuspid Valve  The tricuspid valve is normal in structure and function. The right ventricular  systolic pressure is approximated at 27.9 mmHg plus the right atrial pressure.  There is mild to moderate (1-2+) tricuspid regurgitation.     Aortic Valve  The aortic valve is normal in structure and function.     Pulmonic Valve  The pulmonic valve is not well seen, but is grossly normal. There is mild (1+)  pulmonic valvular regurgitation.     Vessels  The aortic root is normal size. The ascending aorta is Mildly dilated. The  inferior vena cava is normal.     Pericardium  There is no pericardial effusion.     ______________________________________________________________________________  MMode/2D Measurements & Calculations  IVSd: 0.95 cm  LVIDd: 4.8 cm  LVIDs: 3.6 cm  LVPWd: 0.83 cm  FS: 25.2 %     LV mass(C)d: 145.3 grams  LV mass(C)dI: 83.4 grams/m2  Ao root diam: 2.9 cm  LA dimension: 3.9 cm  asc Aorta Diam: 3.8 cm  LA/Ao: 1.3  LVOT diam: 1.8 cm  LVOT area: 2.5 cm2  LA Volume (BP): 44.3 ml  LA Volume Index (BP): 25.5 ml/m2  RWT: 0.34     Doppler Measurements & Calculations  MV E max ewelina: 95.4 cm/sec  MV A max ewelina: 113.0 cm/sec  MV E/A: 0.84     MV max P.3 mmHg  MV mean  P.0 mmHg  MV V2 VTI: 42.7 cm  MV dec time: 0.27 sec  PA acc time: 0.13 sec  TR max ewelina: 264.0 cm/sec  TR max P.9 mmHg  E/E' av.4  Lateral E/e': 12.4  Medial E/e': 12.5     ______________________________________________________________________________  Report approved by: Viki Silva 2022 09:15 AM         Cardiac Catheterization    Narrative      Prox LAD lesion is 99% stenosed.    1st Diag lesion is 60% stenosed.    Prox RCA to Mid RCA lesion is 90% stenosed.     1.  Severe two-vessel coronary artery disease.  2.  Severe stenosis of the LAD just after the origin of a very proximal   first diagonal branch.  99% stenosis with VIKY II flow in the distal LAD.  3.  Severe focal calcific stenosis of the mid RCA.  4.  Complex but uneventful IVUS guided PCI of the proximal LAD with a 3.0   x 20 mm Synergy drug-eluting stent.  5.  Complex but uneventful PCI of the right coronary artery with a 3.0 x   16 mm Synergy drug-eluting stent.

## 2022-09-20 NOTE — PROGRESS NOTES
Patient is A/O x 4, vss, a-febrile, denies chest pain, up to the bathroom with A1 GB, patient had Angiogram this afternoon through right Radial approach with intervention, two stents were placed to LAD and RCA, tele SR, TRIPLETT, on room air, wears C-Pap at night, continue to monitor.

## 2022-09-21 NOTE — PLAN OF CARE
Occupational Therapy and Cardiac Rehab Discharge Summary    Reason for therapy discharge:    Discharged to home with outpatient therapy.    Progress towards therapy goal(s). See goals on Care Plan in [facility] electronic health record for goal details.  Goals partially met.  Barriers to achieving goals:   discharge from facility.    Therapy recommendation(s):    Continued therapy is recommended.  Rationale/Recommendations:  Patient appears safe to discharge home with intermittent assist from spouse for higher level IADLs. Patient would benefit from continued CR in outpatient setting to progress to prior level of function.

## 2022-09-22 ENCOUNTER — VIRTUAL VISIT (OUTPATIENT)
Dept: FAMILY MEDICINE | Facility: CLINIC | Age: 72
End: 2022-09-22
Payer: COMMERCIAL

## 2022-09-22 ENCOUNTER — TELEPHONE (OUTPATIENT)
Dept: CARDIOLOGY | Facility: CLINIC | Age: 72
End: 2022-09-22

## 2022-09-22 DIAGNOSIS — J45.50 SEVERE PERSISTENT ASTHMA WITHOUT COMPLICATION (H): ICD-10-CM

## 2022-09-22 DIAGNOSIS — U07.1 INFECTION DUE TO 2019 NOVEL CORONAVIRUS: Primary | ICD-10-CM

## 2022-09-22 PROCEDURE — 99214 OFFICE O/P EST MOD 30 MIN: CPT | Mod: CS | Performed by: FAMILY MEDICINE

## 2022-09-22 RX ORDER — ALBUTEROL SULFATE 0.83 MG/ML
2.5 SOLUTION RESPIRATORY (INHALATION) EVERY 6 HOURS PRN
Qty: 90 ML | Refills: 0 | Status: SHIPPED | OUTPATIENT
Start: 2022-09-22

## 2022-09-22 RX ORDER — ALBUTEROL SULFATE 90 UG/1
1-2 AEROSOL, METERED RESPIRATORY (INHALATION) EVERY 6 HOURS
Qty: 18 G | Refills: 0 | Status: SHIPPED | OUTPATIENT
Start: 2022-09-22

## 2022-09-22 NOTE — TELEPHONE ENCOUNTER
Patient was admitted to [facility] on 9/18/22 who presents for the evaluation of chest and abdominal discomfort and SOB.    PMH: asthma, bronchiectasis, LAILA on CPAP.    Echo showed EF of 55% with base-mid inferolateral, apical lateral, mid-apical inferior hypokinesis.    9/19/22: Coronary angiogram via RRA s/p KELVIN to prox LAD for 99% lesion, KELVIN to prox-RCA for 90% lesion with D1 at 60%.     Episodes of SVT.    Pt was started on ASA, Brilinta and NTG at time of discharge.    Called patient to discuss any post hospital d/c questions, review medication changes, and confirm f/u appts, but no answer. VM left to return my phone call.     RN will confirm with patient that she was d/c with an adequate supply of the antiplatelet Brilinta, and reminded of importance of taking without interruption.     Pt has an Rx for PRN SL Nitroglycerin.     RN will confirm with patient that she has an OV scheduled on 10/26/22 at 0955 with MATEO Vannesa Kenny at our [facility].     Cardiac rehab is scheduled on 10/5/22 at 1345. SILVERIO Kemp RN.

## 2022-09-22 NOTE — PROGRESS NOTES
Edda is a 72 year old who is being evaluated via a billable telephone visit.      What phone number would you like to be contacted at?[phone]  How would you like to obtain your AVS? MyChart         Assessment & Plan     Infection due to 2019 novel coronavirus  Due to interactions for Ticagrelor and rosuvastatin and recent PCI, I did not prescribe Paxlovid and risk of hold these medications far out weigh the benefits. She is at high risk for hospitalization and unfortunately can not have Monoclonial antibody treatment until 09/27/2022. Hence the choice for Molnupiravir was made.    - REVIEW OF HEALTH MAINTENANCE PROTOCOL ORDERS  - molnupiravir (LAGEVRIO) 200 MG capsule; Take 4 capsules (800 mg) by mouth every 12 hours for 5 days    Severe persistent asthma without complication  - albuterol (PROAIR HFA/PROVENTIL HFA/VENTOLIN HFA) 108 (90 Base) MCG/ACT inhaler; Inhale 1-2 puffs into the lungs every 6 hours  - albuterol (PROVENTIL) (2.5 MG/3ML) 0.083% neb solution; Take 1 vial (2.5 mg) by nebulization every 6 hours as needed for shortness of breath / dyspnea or wheezing  - Nebulizer and Supplies Order for DME - ONLY FOR DME        No follow-ups on file.    Russ Trevino MD  [facility]   Edda is a 72 year old, presenting for the following health issues:  Covid Concern      HPI     Patient with a medical history of asthma, bronchiectasis, LAILA on CPAP, s/p PCI 09/19 for NSTEMI  COVID-19 Symptom Review  How many days ago did these symptoms start? 1 days    Are any of the following symptoms significant for you?  New or worsening difficulty breathing? Yes    Please describe what kind of difficulty you are having breathing:Mild dyspnea (able to do ADLs without difficulty, mild shortness of breath with activities such as climbing one or two flights of stairs or walking briskly)    Worsening cough? Yes, I am coughing up mucus.    Fever or chills? Yes, the highest temperature was  100.4    Headache: YES    Sore throat: YES    Chest pain: No    Diarrhea: No    Body aches? YES    What treatments has patient tried? None, just meds taken from recent heart attack   Does patient live in a nursing home, group home, or shelter? No  Does patient have a way to get food/medications during quarantined? Yes, I have a friend or family member who can help me.        Review of Systems   Constitutional, HEENT, cardiovascular, pulmonary, gi and gu systems are negative, except as otherwise noted.      Objective           Vitals:  No vitals were obtained today due to virtual visit.    Physical Exam   healthy, alert and no distress  PSYCH: Alert and oriented times 3; coherent speech, normal   rate and volume, able to articulate logical thoughts, able   to abstract reason, no tangential thoughts, no hallucinations   or delusions  Her affect is normal  RESP: No cough, no audible wheezing, able to talk in full sentences  Remainder of exam unable to be completed due to telephone visits            Phone call duration: 12  minutes

## 2022-09-23 ENCOUNTER — TELEPHONE (OUTPATIENT)
Dept: CARDIOLOGY | Facility: CLINIC | Age: 72
End: 2022-09-23

## 2022-09-23 NOTE — TELEPHONE ENCOUNTER
Notified by nursing of patient call.     First, I did call the cath lab and spoke with rahat VARGAS to see if there are any orange products that are used in the lab.  The only thing is the hibiclens.     I think set up a video visit with patient to see her arm.   The spot she is talking about is not at the cath access site.  It is on the dorsal side of the forearm, laterally.   It appears to be a pustule with surrounding significant ecchymosis.   Thinking back, a cat may have scratched her, but she is not certain.  Nonetheless, the pustule itself does not appear erythematous to me nor is it hot per her report.    I did tell her that if it turns red or warm over the weekend, she should be seen in an Urgent care for antibiotics.     She is quite concerned and has requested a nurse call her back Monday to check in on her.  I will route this back to our nursing team.     Ariel Wood PA-C 9/23/2022 2:56 PM

## 2022-09-23 NOTE — TELEPHONE ENCOUNTER
[facility] Call Center    Phone Message    May a detailed message be left on voicemail: yes     Reason for Call: Other: Edda had a coronary angiogram on 09.19.22. There is an orange rubbery thing sticking out from her incision site. She wonders if it could be from the balloon used to place her stent or something else from the procedure. Please give her a call back as soon as possible to discuss what she needs to do. Edda can be reached at [phone].     Thank you!  [facility]    Action Taken: Other: Cardiology    Travel Screening: Not Applicable

## 2022-09-23 NOTE — TELEPHONE ENCOUNTER
Writer returned pt's phone calls. Pt states she was diagnosed with COVID on day of discharge via home test. Pt states she has HA, fever, sore throat, chills. No increased chest discomfort. Mild lightheadedness. States her home 02 sats were 92% on RA-has PRN home 02 if needed. Had a virtual visit with her PMD this morning.    RRA access site is healing without any active swelling, bleeding or signs of infection. See telephone message sent to Dr. Boss today.    Denies any medication questions. States she has been taking Brilinta every 12 hours and reminded to without interruption. Per chart review, pt was dispensed 60 tabs at discharge with one refill.    Follow up appts as below were reviewed. Pt verbalized understanding of all instructions without further questions. SILVERIO Kemp RN.

## 2022-09-24 LAB
ATRIAL RATE - MUSE: 54 BPM
ATRIAL RATE - MUSE: 54 BPM
DIASTOLIC BLOOD PRESSURE - MUSE: NORMAL MMHG
DIASTOLIC BLOOD PRESSURE - MUSE: NORMAL MMHG
INTERPRETATION ECG - MUSE: NORMAL
INTERPRETATION ECG - MUSE: NORMAL
P AXIS - MUSE: 52 DEGREES
P AXIS - MUSE: 71 DEGREES
PR INTERVAL - MUSE: 168 MS
PR INTERVAL - MUSE: 182 MS
QRS DURATION - MUSE: 60 MS
QRS DURATION - MUSE: 62 MS
QT - MUSE: 456 MS
QT - MUSE: 470 MS
QTC - MUSE: 432 MS
QTC - MUSE: 445 MS
R AXIS - MUSE: 43 DEGREES
R AXIS - MUSE: 9 DEGREES
SYSTOLIC BLOOD PRESSURE - MUSE: NORMAL MMHG
SYSTOLIC BLOOD PRESSURE - MUSE: NORMAL MMHG
T AXIS - MUSE: 16 DEGREES
T AXIS - MUSE: 47 DEGREES
VENTRICULAR RATE- MUSE: 54 BPM
VENTRICULAR RATE- MUSE: 54 BPM

## 2022-09-25 LAB
ATRIAL RATE - MUSE: 83 BPM
DIASTOLIC BLOOD PRESSURE - MUSE: NORMAL MMHG
INTERPRETATION ECG - MUSE: NORMAL
P AXIS - MUSE: 76 DEGREES
PR INTERVAL - MUSE: 144 MS
QRS DURATION - MUSE: 74 MS
QT - MUSE: 408 MS
QTC - MUSE: 479 MS
R AXIS - MUSE: 35 DEGREES
SYSTOLIC BLOOD PRESSURE - MUSE: NORMAL MMHG
T AXIS - MUSE: 63 DEGREES
VENTRICULAR RATE- MUSE: 83 BPM

## 2022-09-26 NOTE — TELEPHONE ENCOUNTER
Contacted patient to inquire how she is doing today and how this weekend. Spoke with patient. Patient states that she has been diagnosed with COVID-19, so she is not feeling well from that. Patient states that the area of concern on her arm is looking better and has not grown in size. Advised patient to focus on getting better and resting from COVID-19. Patient verbalized understanding and agreed with plan of care. No further questions.

## 2022-09-28 NOTE — PROGRESS NOTES
A Study to EXhibit Percutaneous coronary Artery dilatation with Non-Slip Element balloon (EXPANSE-PTCA)  Protocol Revision B    PI: Dr. Rainey    Adverse Event (diagnosis preferred): Radial Ecchymosis  ADVERSE EVENT Description: Ecchymosis measuring 7cm X 4cm Radial Artery    Serious:     If Serious, reportability criteria   [] Led to death   [] Led to fetal distress, fetal death, a congenital abnormality or birth defect including physical or mental impairment  [] Required medical or surgical intervention to prevent  life threatening illness or injury or permanent impairment to a body structure or a body         function  [] Resulted in new hospitalization (>24 hours)  [] Resulted in permanent impairment of a body structure or function including chronic diseases  [] Resulted in prolongation of existing hospitalization   [] Was a life-threatening illness or injury    Reportable to IRB:  NO      Date of Awareness (site): 09/20/2022  Start Date: 09/19/2022  End Date: 09/23/2022    Outcome:   [x] Resolved  [] Resolved with Sequelae  [] Ongoing  [] Ongoing @ study completion  [] Death     ACTIONS TAKEN [x] Yes   [] No  Medication or therapies taken:    Date:     [] Blood transfusion   [] Diagnostics   [] Medication   [] Minimally invasive intervention   [] Surgical intervention   [] Target lesion revascularization   [x] Other, describe: TR bands X 2 to site     Dr. Rainey: Please assign causality of above AE  Relationship of the adverse event to the study/index procedure:    [x] Definitely   [] Probably  [] Possibly   [] Unlikely   [] Not related  (select only one; see protocol for definitions)       Relationship of the adverse event to the study device:    [] Definitely  [] Probably  [] Possibly  [] Unlikely   [x] Not related  (select only one; see protocol for definitions)          A Study to EXhibit Percutaneous coronary Artery dilatation with Non-Slip Element balloon (EXPANSE-PTCA)  Protocol Revision B    PI:  Dr. Rainey    Adverse Event (diagnosis preferred): Vomiting  ADVERSE EVENT Description: Patient vomited X 1    Serious: NO     If Serious, reportability criteria   [] Led to death   [] Led to fetal distress, fetal death, a congenital abnormality or birth defect including physical or mental impairment  [] Required medical or surgical intervention to prevent  life threatening illness or injury or permanent impairment to a body structure or a body         function  [] Resulted in new hospitalization (>24 hours)  [] Resulted in permanent impairment of a body structure or function including chronic diseases  [] Resulted in prolongation of existing hospitalization  [] Was a life-threatening illness or injury    Reportable to IRB:  NO      Date of Awareness (site): 09/20/2022    Start Date: 09/20/2022  End Date: 09/20/2022    Outcome:   [x] Resolved  [] Resolved with Sequelae  [] Ongoing  [] Ongoing @ study completion  [] Death     ACTIONS TAKEN [] Yes   [x] No  Medication or therapies taken:    Date:     [] Blood transfusion   [] Diagnostics   [] Medication   [] Minimally invasive intervention   [] Surgical intervention   [] Target lesion revascularization   [] Other, describe:     Dr. Rainey: Please assign causality of above AE  Relationship of the adverse event to the study/index procedure:    [] Definitely   [] Probably  [x] Possibly   [] Unlikely   [] Not related  (select only one; see protocol for definitions)       Relationship of the adverse event to the study device:    [] Definitely  [] Probably  [] Possibly  [] Unlikely   [x] Not related  (select only one; see protocol for definitions)

## 2022-10-05 ENCOUNTER — HOSPITAL ENCOUNTER (OUTPATIENT)
Dept: CARDIAC REHAB | Facility: CLINIC | Age: 72
Discharge: HOME | End: 2022-10-05
Attending: INTERNAL MEDICINE
Payer: COMMERCIAL

## 2022-10-05 DIAGNOSIS — I21.4 NSTEMI (NON-ST ELEVATED MYOCARDIAL INFARCTION) (H): ICD-10-CM

## 2022-10-05 PROCEDURE — 93797 PHYS/QHP OP CAR RHAB WO ECG: CPT | Performed by: OCCUPATIONAL THERAPIST

## 2022-10-05 PROCEDURE — 93798 PHYS/QHP OP CAR RHAB W/ECG: CPT | Performed by: OCCUPATIONAL THERAPIST

## 2022-10-09 ENCOUNTER — HOSPITAL ENCOUNTER (EMERGENCY)
Facility: CLINIC | Age: 72
Discharge: HOME | End: 2022-10-09
Attending: EMERGENCY MEDICINE | Admitting: EMERGENCY MEDICINE
Payer: COMMERCIAL

## 2022-10-09 ENCOUNTER — APPOINTMENT (OUTPATIENT)
Dept: GENERAL RADIOLOGY | Facility: CLINIC | Age: 72
End: 2022-10-09
Attending: EMERGENCY MEDICINE
Payer: COMMERCIAL

## 2022-10-09 ENCOUNTER — APPOINTMENT (OUTPATIENT)
Dept: CARDIOLOGY | Facility: CLINIC | Age: 72
End: 2022-10-09
Attending: EMERGENCY MEDICINE
Payer: COMMERCIAL

## 2022-10-09 VITALS
WEIGHT: 168 LBS | HEART RATE: 56 BPM | HEIGHT: 60 IN | RESPIRATION RATE: 19 BRPM | SYSTOLIC BLOOD PRESSURE: 130 MMHG | TEMPERATURE: 98.5 F | OXYGEN SATURATION: 97 % | BODY MASS INDEX: 32.98 KG/M2 | DIASTOLIC BLOOD PRESSURE: 69 MMHG

## 2022-10-09 DIAGNOSIS — R53.83 OTHER FATIGUE: ICD-10-CM

## 2022-10-09 DIAGNOSIS — R00.2 PALPITATIONS: ICD-10-CM

## 2022-10-09 DIAGNOSIS — M79.10 MYALGIA: ICD-10-CM

## 2022-10-09 LAB
ALBUMIN SERPL-MCNC: 2.9 G/DL (ref 3.4–5)
ALBUMIN SERPL-MCNC: 2.9 G/DL (ref 3.4–5)
ALP SERPL-CCNC: 99 U/L (ref 40–150)
ALP SERPL-CCNC: 99 U/L (ref 40–150)
ALT SERPL W P-5'-P-CCNC: 20 U/L (ref 0–50)
ALT SERPL W P-5'-P-CCNC: 20 U/L (ref 0–50)
ANION GAP SERPL CALCULATED.3IONS-SCNC: 7 MMOL/L (ref 3–14)
ANION GAP SERPL CALCULATED.3IONS-SCNC: 7 MMOL/L (ref 3–14)
AST SERPL W P-5'-P-CCNC: 16 U/L (ref 0–45)
AST SERPL W P-5'-P-CCNC: 16 U/L (ref 0–45)
ATRIAL RATE - MUSE: 52 BPM
BASOPHILS # BLD AUTO: 0 10E3/UL (ref 0–0.2)
BASOPHILS NFR BLD AUTO: 0 %
BILIRUB DIRECT SERPL-MCNC: 0.2 MG/DL (ref 0–0.2)
BILIRUB SERPL-MCNC: 0.6 MG/DL (ref 0.2–1.3)
BILIRUB SERPL-MCNC: 0.6 MG/DL (ref 0.2–1.3)
BUN SERPL-MCNC: 15 MG/DL (ref 7–30)
BUN SERPL-MCNC: 15 MG/DL (ref 7–30)
CALCIUM SERPL-MCNC: 8.7 MG/DL (ref 8.5–10.1)
CALCIUM SERPL-MCNC: 8.7 MG/DL (ref 8.5–10.1)
CHLORIDE BLD-SCNC: 109 MMOL/L (ref 94–109)
CHLORIDE BLD-SCNC: 109 MMOL/L (ref 94–109)
CO2 SERPL-SCNC: 24 MMOL/L (ref 20–32)
CO2 SERPL-SCNC: 24 MMOL/L (ref 20–32)
CREAT SERPL-MCNC: 0.89 MG/DL (ref 0.52–1.04)
CREAT SERPL-MCNC: 0.93 MG/DL (ref 0.52–1.04)
DIASTOLIC BLOOD PRESSURE - MUSE: NORMAL MMHG
EOSINOPHIL # BLD AUTO: 0.1 10E3/UL (ref 0–0.7)
EOSINOPHIL NFR BLD AUTO: 2 %
ERYTHROCYTE [DISTWIDTH] IN BLOOD BY AUTOMATED COUNT: 14.6 % (ref 10–15)
GFR SERPL CREATININE-BSD FRML MDRD: 65 ML/MIN/1.73M2
GFR SERPL CREATININE-BSD FRML MDRD: 69 ML/MIN/1.73M2
GLUCOSE BLD-MCNC: 127 MG/DL (ref 70–99)
GLUCOSE BLD-MCNC: 127 MG/DL (ref 70–99)
HCT VFR BLD AUTO: 38.6 % (ref 35–47)
HGB BLD-MCNC: 12.5 G/DL (ref 11.7–15.7)
HOLD SPECIMEN: NORMAL
IMM GRANULOCYTES # BLD: 0 10E3/UL
IMM GRANULOCYTES NFR BLD: 0 %
INTERPRETATION ECG - MUSE: NORMAL
LYMPHOCYTES # BLD AUTO: 1.4 10E3/UL (ref 0.8–5.3)
LYMPHOCYTES NFR BLD AUTO: 26 %
MCH RBC QN AUTO: 32.6 PG (ref 26.5–33)
MCHC RBC AUTO-ENTMCNC: 32.4 G/DL (ref 31.5–36.5)
MCV RBC AUTO: 101 FL (ref 78–100)
MONOCYTES # BLD AUTO: 0.5 10E3/UL (ref 0–1.3)
MONOCYTES NFR BLD AUTO: 9 %
NEUTROPHILS # BLD AUTO: 3.4 10E3/UL (ref 1.6–8.3)
NEUTROPHILS NFR BLD AUTO: 63 %
NRBC # BLD AUTO: 0 10E3/UL
NRBC BLD AUTO-RTO: 0 /100
P AXIS - MUSE: 65 DEGREES
PLATELET # BLD AUTO: 269 10E3/UL (ref 150–450)
POTASSIUM BLD-SCNC: 3.5 MMOL/L (ref 3.4–5.3)
POTASSIUM BLD-SCNC: 3.5 MMOL/L (ref 3.4–5.3)
PR INTERVAL - MUSE: 158 MS
PROT SERPL-MCNC: 6.9 G/DL (ref 6.8–8.8)
PROT SERPL-MCNC: 6.9 G/DL (ref 6.8–8.8)
QRS DURATION - MUSE: 74 MS
QT - MUSE: 442 MS
QTC - MUSE: 411 MS
R AXIS - MUSE: 27 DEGREES
RBC # BLD AUTO: 3.84 10E6/UL (ref 3.8–5.2)
SODIUM SERPL-SCNC: 140 MMOL/L (ref 133–144)
SODIUM SERPL-SCNC: 140 MMOL/L (ref 133–144)
SYSTOLIC BLOOD PRESSURE - MUSE: NORMAL MMHG
T AXIS - MUSE: 42 DEGREES
TROPONIN I SERPL HS-MCNC: 15 NG/L
VENTRICULAR RATE- MUSE: 52 BPM
WBC # BLD AUTO: 5.4 10E3/UL (ref 4–11)

## 2022-10-09 PROCEDURE — 250N000013 HC RX MED GY IP 250 OP 250 PS 637: Performed by: EMERGENCY MEDICINE

## 2022-10-09 PROCEDURE — 84484 ASSAY OF TROPONIN QUANT: CPT | Performed by: EMERGENCY MEDICINE

## 2022-10-09 PROCEDURE — 71046 X-RAY EXAM CHEST 2 VIEWS: CPT

## 2022-10-09 PROCEDURE — 93227 XTRNL ECG REC<48 HR R&I: CPT | Performed by: INTERNAL MEDICINE

## 2022-10-09 PROCEDURE — 85025 COMPLETE CBC W/AUTO DIFF WBC: CPT | Performed by: EMERGENCY MEDICINE

## 2022-10-09 PROCEDURE — 99285 EMERGENCY DEPT VISIT HI MDM: CPT | Mod: 25

## 2022-10-09 PROCEDURE — 36415 COLL VENOUS BLD VENIPUNCTURE: CPT | Performed by: EMERGENCY MEDICINE

## 2022-10-09 PROCEDURE — 82248 BILIRUBIN DIRECT: CPT | Performed by: EMERGENCY MEDICINE

## 2022-10-09 PROCEDURE — 93005 ELECTROCARDIOGRAM TRACING: CPT

## 2022-10-09 PROCEDURE — 84450 TRANSFERASE (AST) (SGOT): CPT | Performed by: EMERGENCY MEDICINE

## 2022-10-09 PROCEDURE — 93225 XTRNL ECG REC<48 HRS REC: CPT

## 2022-10-09 PROCEDURE — 80053 COMPREHEN METABOLIC PANEL: CPT | Performed by: EMERGENCY MEDICINE

## 2022-10-09 RX ORDER — ASPIRIN 81 MG/1
162 TABLET, CHEWABLE ORAL ONCE
Status: COMPLETED | OUTPATIENT
Start: 2022-10-09 | End: 2022-10-09

## 2022-10-09 RX ADMIN — ASPIRIN 81 MG CHEWABLE TABLET 162 MG: 81 TABLET CHEWABLE at 09:51

## 2022-10-09 ASSESSMENT — ENCOUNTER SYMPTOMS
DIZZINESS: 1
ABDOMINAL PAIN: 1
FATIGUE: 1
MYALGIAS: 1

## 2022-10-09 ASSESSMENT — ACTIVITIES OF DAILY LIVING (ADL): ADLS_ACUITY_SCORE: 35

## 2022-10-09 NOTE — ED TRIAGE NOTES
Extreme fatigue with chest pain since yesterday, pain radiating into right jaw and bilateral arms. History of stents placement 3 weeks ago.      Triage Assessment     Row Name 10/09/22 0853       Triage Assessment (Adult)    Airway WDL WDL       Respiratory WDL    Respiratory WDL WDL       Skin Circulation/Temperature WDL    Skin Circulation/Temperature WDL WDL       Cardiac WDL    Cardiac WDL chest pain       Chest Pain Assessment    Chest Pain Radiation arm;jaw       Peripheral/Neurovascular WDL    Peripheral Neurovascular WDL WDL       Cognitive/Neuro/Behavioral WDL    Cognitive/Neuro/Behavioral WDL WDL

## 2022-10-09 NOTE — ED PROVIDER NOTES
History   Chief Complaint:  Fatigue and myalgia       HPI   Edda Mckeon is a 72 year old female with history of heart failure who presents with fatigue, dizziness, right jaw pain, and arm pain for the last 4 days. Patient had cardiac stents placed 3 weeks ago and prior to this she had abdominal pain for months. Patient reports COVID infection roughly 2 weeks ago.  Patient notes slight chest discomfort which was not significantly improved by nitroglycerin. She also notes slight abdominal pain but denies leg swelling. Patient has been using a CPAP at home.    Review of Systems   Constitutional: Positive for fatigue.   Cardiovascular: Positive for chest pain. Negative for leg swelling.   Gastrointestinal: Positive for abdominal pain.   Musculoskeletal: Positive for myalgias.   Neurological: Positive for dizziness.   All other systems reviewed and are negative.      Allergies:  Atorvastatin  Hmg-Coa-R Inhibitors  Pravastatin  Scopolamine    Medications:  Albuterol  Toprol   Synthroid  Nitrostat  Crestor  brilinta    Past Medical History:     Valvular heart disease  Mitral regurgitation  Severe asthma  Unstable angina  Hyperlipidemia   GERD  Depression  Fibromyalgia  Hypothyroidism   AGUILAR  ADD  Nephrolithiasis  Lung disease    Past Surgical History:    Coronary angiogram   section  Heart catheterization     Family History:    Mother: atrial fibrillation, dementia, hyperlipidemia, stroke, thyroid disease   Father: heart disease, dementia, cerebrovascular disease, stroke, thyroid disease     Social History:  The patient presents to the ED with   PCP: Emi Monk     Physical Exam     Patient Vitals for the past 24 hrs:   BP Temp Temp src Pulse Resp SpO2 Height Weight   10/09/22 1108 130/69 -- -- 56 19 -- -- --   10/09/22 0950 (P) 109/60 -- -- -- -- -- -- --   10/09/22 0851 (!) 156/69 -- -- -- -- -- -- --   10/09/22 0849 -- 98.5  F (36.9  C) Temporal 61 16 97 % 1.524 m (5') 76.2 kg (168 lb)        Physical Exam  General: Alert and cooperative with exam. Patient in mild distress. Normal mentation.  Head:  Scalp is NC/AT  Eyes:  No scleral icterus, PERRL  ENT:  The external nose and ears are normal. The oropharynx is normal and without erythema; mucus membranes are moist. Uvula midline, no evidence of deep space infection.  Neck:  Normal range of motion without rigidity.  CV:  Regular rate and rhythm    No pathologic murmur   Resp:  Breath sounds are clear bilaterally    Non-labored, no retractions or accessory muscle use  GI:  Abdomen is soft, no distension, no tenderness. No peritoneal signs  MS:  No lower extremity edema. reproducible jaw tenderness to palpitation without overlying skin change  Skin:  Warm and dry, No rash or lesions noted.  Neuro: Oriented x 3. No gross motor deficits.       Emergency Department Course   ECG  ECG taken at 0856, ECG read at 0901  Sinus bradycardia  Otherwise normal ECG   No significant change as compared to prior, dated 9/20/22.  Rate 52 bpm. OK interval 158 ms. QRS duration 74 ms. QT/QTc 442/411 ms. P-R-T axes 65 27 42.     Imaging:  Chest XR,  PA & LAT   Final Result   IMPRESSION: AP and lateral views of the chest were obtained. Cardiomediastinal silhouette is within normal limits. No suspicious focal pulmonary opacities. No significant pleural effusion or pneumothorax.      Holter Monitor 48 hour Adult Pediatric    (Results Pending)     Report per radiology    Laboratory:  Labs Ordered and Resulted from Time of ED Arrival to Time of ED Departure   BASIC METABOLIC PANEL - Abnormal       Result Value    Sodium 140      Potassium 3.5      Chloride 109      Carbon Dioxide (CO2) 24      Anion Gap 7      Urea Nitrogen 15      Creatinine 0.89      Calcium 8.7      Glucose 127 (*)     GFR Estimate 69     CBC WITH PLATELETS AND DIFFERENTIAL - Abnormal    WBC Count 5.4      RBC Count 3.84      Hemoglobin 12.5      Hematocrit 38.6       (*)     MCH 32.6      MCHC 32.4       RDW 14.6      Platelet Count 269      % Neutrophils 63      % Lymphocytes 26      % Monocytes 9      % Eosinophils 2      % Basophils 0      % Immature Granulocytes 0      NRBCs per 100 WBC 0      Absolute Neutrophils 3.4      Absolute Lymphocytes 1.4      Absolute Monocytes 0.5      Absolute Eosinophils 0.1      Absolute Basophils 0.0      Absolute Immature Granulocytes 0.0      Absolute NRBCs 0.0     COMPREHENSIVE METABOLIC PANEL - Abnormal    Sodium 140      Potassium 3.5      Chloride 109      Carbon Dioxide (CO2) 24      Anion Gap 7      Urea Nitrogen 15      Creatinine 0.93      Calcium 8.7      Glucose 127 (*)     Alkaline Phosphatase 99      AST 16      ALT 20      Protein Total 6.9      Albumin 2.9 (*)     Bilirubin Total 0.6      GFR Estimate 65     HEPATIC FUNCTION PANEL - Abnormal    Bilirubin Total 0.6      Bilirubin Direct 0.2      Protein Total 6.9      Albumin 2.9 (*)     Alkaline Phosphatase 99      AST 16      ALT 20     TROPONIN I - Normal    Troponin I High Sensitivity 15          Emergency Department Course:  Reviewed:  I reviewed nursing notes, vitals, past medical history and Care Everywhere    Assessments:  0915 I obtained history and examined the patient as noted above.   1107 I rechecked the patient and explained findings.    Interventions:  0951 Aspirin, 162, oral    Disposition:  The patient was discharged to home.     Impression & Plan   Medical Decision Making:  Patient is a 72-year-old female who presents with jaw pain, fatigue, and arm discomfort; status post recent stent placement and recent COVID infection.  Patient's medical history and records reviewed.  On evaluation she is well-appearing with normal vital signs.  EKG is unchanged from baseline status post and placement.  Troponin is not significantly elevated.  Patient is without significant chest pain or shortness of breath.  Remainder of labs unremarkable as above.  Chest x-ray without significant findings.  She did receive  162 mg aspirin on ED arrival.  At this time there is no evidence of emergent etiology for patient's symptoms.  Possibly symptoms related to recent COVID infection/viral syndrome.  Patient's jaw discomfort is reproducible with palpation and there is no sign of infectious process.  Low suspicion for cardiac etiology.  At time of discharge patient mentioned that she had had several brief episodes over the last week where her heart rate increased as high as the 160s unexpectedly.  Patient was discharged with 48-hour Holter monitor and recommended close follow-up with PCP.  Return precautions were discussed.  Patient discharged home.    Diagnosis:    ICD-10-CM    1. Other fatigue  R53.83       2. Myalgia  M79.10       3. Palpitations  R00.2 Holter Monitor 48 hour Adult Pediatric     Holter Monitor 48 hour Adult Pediatric     Holter Monitor 48 hour Adult Pediatric          Scribe Disclosure:  Feliz ATKINSON, am serving as a scribe at 9:05 AM on 10/9/2022 to document services personally performed by Louie Bryant DO based on my observations and the provider's statements to me.            Louie Escobar DO  10/10/22 1435

## 2022-10-10 ENCOUNTER — HOSPITAL ENCOUNTER (OUTPATIENT)
Dept: CARDIAC REHAB | Facility: CLINIC | Age: 72
Discharge: HOME | End: 2022-10-10
Attending: INTERNAL MEDICINE
Payer: COMMERCIAL

## 2022-10-10 PROCEDURE — 93798 PHYS/QHP OP CAR RHAB W/ECG: CPT | Performed by: REHABILITATION PRACTITIONER

## 2022-10-12 ENCOUNTER — HOSPITAL ENCOUNTER (OUTPATIENT)
Dept: CARDIAC REHAB | Facility: CLINIC | Age: 72
Discharge: HOME | End: 2022-10-12
Attending: INTERNAL MEDICINE
Payer: COMMERCIAL

## 2022-10-12 PROCEDURE — 93798 PHYS/QHP OP CAR RHAB W/ECG: CPT | Performed by: REHABILITATION PRACTITIONER

## 2022-10-12 PROCEDURE — 93797 PHYS/QHP OP CAR RHAB WO ECG: CPT | Performed by: REHABILITATION PRACTITIONER

## 2022-10-19 ENCOUNTER — HOSPITAL ENCOUNTER (OUTPATIENT)
Dept: CARDIAC REHAB | Facility: CLINIC | Age: 72
Discharge: HOME | End: 2022-10-19
Attending: INTERNAL MEDICINE
Payer: COMMERCIAL

## 2022-10-19 PROCEDURE — 93798 PHYS/QHP OP CAR RHAB W/ECG: CPT

## 2022-10-19 PROCEDURE — 93797 PHYS/QHP OP CAR RHAB WO ECG: CPT

## 2022-10-21 ENCOUNTER — HOSPITAL ENCOUNTER (OUTPATIENT)
Dept: CARDIAC REHAB | Facility: CLINIC | Age: 72
Discharge: HOME | End: 2022-10-21
Attending: INTERNAL MEDICINE
Payer: COMMERCIAL

## 2022-10-21 PROCEDURE — 93798 PHYS/QHP OP CAR RHAB W/ECG: CPT | Performed by: OCCUPATIONAL THERAPIST

## 2022-10-22 ENCOUNTER — HEALTH MAINTENANCE LETTER (OUTPATIENT)
Age: 72
End: 2022-10-22

## 2022-10-24 ENCOUNTER — HOSPITAL ENCOUNTER (OUTPATIENT)
Dept: CARDIAC REHAB | Facility: CLINIC | Age: 72
Discharge: HOME | End: 2022-10-24
Attending: INTERNAL MEDICINE
Payer: COMMERCIAL

## 2022-10-24 PROCEDURE — 93798 PHYS/QHP OP CAR RHAB W/ECG: CPT | Performed by: OCCUPATIONAL THERAPIST

## 2022-10-27 ENCOUNTER — HOSPITAL ENCOUNTER (OUTPATIENT)
Dept: CARDIAC REHAB | Facility: CLINIC | Age: 72
Discharge: HOME | End: 2022-10-27
Attending: INTERNAL MEDICINE
Payer: COMMERCIAL

## 2022-10-27 PROCEDURE — 93798 PHYS/QHP OP CAR RHAB W/ECG: CPT | Performed by: REHABILITATION PRACTITIONER

## 2022-10-31 ENCOUNTER — NURSE TRIAGE (OUTPATIENT)
Dept: CARDIOLOGY | Facility: CLINIC | Age: 72
End: 2022-10-31

## 2022-10-31 NOTE — TELEPHONE ENCOUNTER
"Patient called and thinks she has side effects of brilinta. See below. She has been taking brilinta for one month and symptoms have been present that long. She has been feeling pins and needles, shooting pains in legs and neck. No rash or bleeding. Mild SOB, weakness. She has also had COVID. She notes she had to take three NTG in the last 6 weeks. No chest discomfort at this time. She feels frustrated she can't progress with her exercises due to her weakness. Will route to the nurses with Dr. Boss.     1. NAME of MEDICATION: \"What medicine are you calling about?\" Brilinta.  2. QUESTION: \"What is your question?\" (e.g., double dose of medicine, side effect) Side effects.  3. PRESCRIBING HCP: \"Who prescribed it?\" Reason: if prescribed by specialist, call should be referred to that group. Cardiology.  4. SYMPTOMS: \"Do you have any symptoms?\" For 1 month since taking it, she has been having a feeling of \"pins and needles, shooting pains in legs, neck.\" A little SOB however she has a lung history. Weakness.   5. SEVERITY: If symptoms are present, ask \"Are they mild, moderate or severe?\" Moderate.        "

## 2022-10-31 NOTE — TELEPHONE ENCOUNTER
Pt needs f/u from her MI, it does not appear that she was seen as scheduled by Vannesa Kenny NP.  She needs to be seen in clinic and evaluated especially with lung dz and the fact that she's needed 6 nitroglycerin in the last several wks.  Continue brillanta for now.

## 2022-11-01 NOTE — TELEPHONE ENCOUNTER
Spoke with patient and she stated that she took an at home test to see if she had a bladder infection and it was positive. Pt is wondering if that would be related to the stent placement or Brilinta. Informed patient that it may be a separate issue and she should contact her PCP to get evaluated as some of her symptoms may be related to an bladder infection or the PCP can at least evaluate her and decide. If they have cardiac concerns they can contact our clinic. Pt aware to stay on Brilinta. Pt is scheduled to Vannesa MCMULLEN 11/16/22. Confirmed with patient it is important to keep that follow up. Pt agreed to plan,.

## 2022-11-02 ENCOUNTER — HOSPITAL ENCOUNTER (OUTPATIENT)
Dept: CARDIAC REHAB | Facility: CLINIC | Age: 72
Discharge: HOME | End: 2022-11-02
Attending: INTERNAL MEDICINE
Payer: COMMERCIAL

## 2022-11-02 PROCEDURE — 93798 PHYS/QHP OP CAR RHAB W/ECG: CPT | Performed by: OCCUPATIONAL THERAPIST

## 2022-11-02 PROCEDURE — 93797 PHYS/QHP OP CAR RHAB WO ECG: CPT | Performed by: OCCUPATIONAL THERAPIST

## 2022-11-04 ENCOUNTER — TELEPHONE (OUTPATIENT)
Dept: CARDIOLOGY | Facility: CLINIC | Age: 72
End: 2022-11-04

## 2022-11-04 NOTE — TELEPHONE ENCOUNTER
Spoke with patient and reviewed that it is ok for her to get her flu vaccine. Patient verbalized understanding and agreed with plan of care. No further questions.

## 2022-11-04 NOTE — TELEPHONE ENCOUNTER
[facility] Call Center    Phone Message    May a detailed message be left on voicemail: yes     Reason for Call: Other: Please call patient to advise:     FLU vaccine Question: Post stent placement 09/20/22.    Patient would like to be advised if it is safe for her to have a flu vaccine after sent placement on 09/20/22.    Ok to leave the answer on her VM. Patient will be having more people exposure soon.    Action Taken: Other: Cardiology    Travel Screening: Not Applicable

## 2022-11-05 ENCOUNTER — APPOINTMENT (OUTPATIENT)
Dept: CT IMAGING | Facility: CLINIC | Age: 72
End: 2022-11-05
Attending: EMERGENCY MEDICINE
Payer: COMMERCIAL

## 2022-11-05 ENCOUNTER — APPOINTMENT (OUTPATIENT)
Dept: MRI IMAGING | Facility: CLINIC | Age: 72
End: 2022-11-05
Attending: EMERGENCY MEDICINE
Payer: COMMERCIAL

## 2022-11-05 ENCOUNTER — HOSPITAL ENCOUNTER (EMERGENCY)
Facility: CLINIC | Age: 72
Discharge: HOME | End: 2022-11-05
Attending: EMERGENCY MEDICINE | Admitting: EMERGENCY MEDICINE
Payer: COMMERCIAL

## 2022-11-05 ENCOUNTER — TRANSFERRED RECORDS (OUTPATIENT)
Dept: HEALTH INFORMATION MANAGEMENT | Facility: CLINIC | Age: 72
End: 2022-11-05

## 2022-11-05 VITALS
RESPIRATION RATE: 16 BRPM | SYSTOLIC BLOOD PRESSURE: 130 MMHG | HEIGHT: 60 IN | DIASTOLIC BLOOD PRESSURE: 71 MMHG | BODY MASS INDEX: 32.79 KG/M2 | TEMPERATURE: 97.4 F | HEART RATE: 54 BPM | WEIGHT: 167 LBS | OXYGEN SATURATION: 98 %

## 2022-11-05 DIAGNOSIS — E86.0 DEHYDRATION: ICD-10-CM

## 2022-11-05 DIAGNOSIS — R26.89 BALANCE PROBLEMS: ICD-10-CM

## 2022-11-05 DIAGNOSIS — H53.9 VISUAL DISTURBANCE: ICD-10-CM

## 2022-11-05 DIAGNOSIS — R42 DIZZINESS: ICD-10-CM

## 2022-11-05 LAB
ALBUMIN UR-MCNC: 10 MG/DL
ANION GAP SERPL CALCULATED.3IONS-SCNC: 5 MMOL/L (ref 3–14)
APPEARANCE UR: CLEAR
APTT PPP: 29 SECONDS (ref 22–38)
ATRIAL RATE - MUSE: 57 BPM
BACTERIA #/AREA URNS HPF: ABNORMAL /HPF
BASOPHILS # BLD AUTO: 0 10E3/UL (ref 0–0.2)
BASOPHILS NFR BLD AUTO: 1 %
BILIRUB UR QL STRIP: NEGATIVE
BUN SERPL-MCNC: 13 MG/DL (ref 7–30)
CALCIUM SERPL-MCNC: 9.3 MG/DL (ref 8.5–10.1)
CHLORIDE BLD-SCNC: 110 MMOL/L (ref 94–109)
CO2 SERPL-SCNC: 26 MMOL/L (ref 20–32)
COLOR UR AUTO: ABNORMAL
CREAT SERPL-MCNC: 0.84 MG/DL (ref 0.52–1.04)
DIASTOLIC BLOOD PRESSURE - MUSE: NORMAL MMHG
EOSINOPHIL # BLD AUTO: 0.1 10E3/UL (ref 0–0.7)
EOSINOPHIL NFR BLD AUTO: 1 %
ERYTHROCYTE [DISTWIDTH] IN BLOOD BY AUTOMATED COUNT: 15.3 % (ref 10–15)
GFR SERPL CREATININE-BSD FRML MDRD: 73 ML/MIN/1.73M2
GLUCOSE BLD-MCNC: 101 MG/DL (ref 70–99)
GLUCOSE UR STRIP-MCNC: NEGATIVE MG/DL
HCT VFR BLD AUTO: 41.4 % (ref 35–47)
HGB BLD-MCNC: 13.1 G/DL (ref 11.7–15.7)
HGB UR QL STRIP: NEGATIVE
IMM GRANULOCYTES # BLD: 0 10E3/UL
IMM GRANULOCYTES NFR BLD: 0 %
INR PPP: 1 (ref 0.85–1.15)
INTERPRETATION ECG - MUSE: NORMAL
KETONES UR STRIP-MCNC: NEGATIVE MG/DL
LEUKOCYTE ESTERASE UR QL STRIP: NEGATIVE
LYMPHOCYTES # BLD AUTO: 1.5 10E3/UL (ref 0.8–5.3)
LYMPHOCYTES NFR BLD AUTO: 26 %
MCH RBC QN AUTO: 32.3 PG (ref 26.5–33)
MCHC RBC AUTO-ENTMCNC: 31.6 G/DL (ref 31.5–36.5)
MCV RBC AUTO: 102 FL (ref 78–100)
MONOCYTES # BLD AUTO: 0.5 10E3/UL (ref 0–1.3)
MONOCYTES NFR BLD AUTO: 9 %
NEUTROPHILS # BLD AUTO: 3.7 10E3/UL (ref 1.6–8.3)
NEUTROPHILS NFR BLD AUTO: 63 %
NITRATE UR QL: NEGATIVE
NRBC # BLD AUTO: 0 10E3/UL
NRBC BLD AUTO-RTO: 0 /100
P AXIS - MUSE: 78 DEGREES
PH UR STRIP: 7.5 [PH] (ref 5–7)
PLATELET # BLD AUTO: 279 10E3/UL (ref 150–450)
POTASSIUM BLD-SCNC: 3.7 MMOL/L (ref 3.4–5.3)
PR INTERVAL - MUSE: 172 MS
QRS DURATION - MUSE: 64 MS
QT - MUSE: 432 MS
QTC - MUSE: 420 MS
R AXIS - MUSE: 65 DEGREES
RBC # BLD AUTO: 4.06 10E6/UL (ref 3.8–5.2)
RBC URINE: 1 /HPF
SODIUM SERPL-SCNC: 141 MMOL/L (ref 133–144)
SP GR UR STRIP: >1.05 (ref 1–1.03)
SQUAMOUS EPITHELIAL: <1 /HPF
SYSTOLIC BLOOD PRESSURE - MUSE: NORMAL MMHG
T AXIS - MUSE: 76 DEGREES
TROPONIN I SERPL HS-MCNC: 15 NG/L
UROBILINOGEN UR STRIP-MCNC: NORMAL MG/DL
VENTRICULAR RATE- MUSE: 57 BPM
WBC # BLD AUTO: 5.8 10E3/UL (ref 4–11)
WBC URINE: 1 /HPF

## 2022-11-05 PROCEDURE — 70496 CT ANGIOGRAPHY HEAD: CPT

## 2022-11-05 PROCEDURE — 36415 COLL VENOUS BLD VENIPUNCTURE: CPT | Performed by: EMERGENCY MEDICINE

## 2022-11-05 PROCEDURE — 99291 CRITICAL CARE FIRST HOUR: CPT | Mod: GC | Performed by: PSYCHIATRY & NEUROLOGY

## 2022-11-05 PROCEDURE — A9585 GADOBUTROL INJECTION: HCPCS | Performed by: EMERGENCY MEDICINE

## 2022-11-05 PROCEDURE — 250N000011 HC RX IP 250 OP 636: Performed by: EMERGENCY MEDICINE

## 2022-11-05 PROCEDURE — 70498 CT ANGIOGRAPHY NECK: CPT

## 2022-11-05 PROCEDURE — 250N000009 HC RX 250: Performed by: EMERGENCY MEDICINE

## 2022-11-05 PROCEDURE — 70450 CT HEAD/BRAIN W/O DYE: CPT

## 2022-11-05 PROCEDURE — 93005 ELECTROCARDIOGRAM TRACING: CPT

## 2022-11-05 PROCEDURE — 81001 URINALYSIS AUTO W/SCOPE: CPT | Performed by: EMERGENCY MEDICINE

## 2022-11-05 PROCEDURE — 85730 THROMBOPLASTIN TIME PARTIAL: CPT | Performed by: EMERGENCY MEDICINE

## 2022-11-05 PROCEDURE — 99285 EMERGENCY DEPT VISIT HI MDM: CPT | Mod: 25

## 2022-11-05 PROCEDURE — 0042T CT HEAD PERFUSION W CONTRAST: CPT

## 2022-11-05 PROCEDURE — 70553 MRI BRAIN STEM W/O & W/DYE: CPT

## 2022-11-05 PROCEDURE — 255N000002 HC RX 255 OP 636: Performed by: EMERGENCY MEDICINE

## 2022-11-05 PROCEDURE — 85610 PROTHROMBIN TIME: CPT | Performed by: EMERGENCY MEDICINE

## 2022-11-05 PROCEDURE — 82310 ASSAY OF CALCIUM: CPT | Performed by: EMERGENCY MEDICINE

## 2022-11-05 PROCEDURE — 85025 COMPLETE CBC W/AUTO DIFF WBC: CPT | Performed by: EMERGENCY MEDICINE

## 2022-11-05 PROCEDURE — 84484 ASSAY OF TROPONIN QUANT: CPT | Performed by: EMERGENCY MEDICINE

## 2022-11-05 RX ORDER — IOPAMIDOL 755 MG/ML
125 INJECTION, SOLUTION INTRAVASCULAR ONCE
Status: COMPLETED | OUTPATIENT
Start: 2022-11-05 | End: 2022-11-05

## 2022-11-05 RX ORDER — GADOBUTROL 604.72 MG/ML
7 INJECTION INTRAVENOUS ONCE
Status: COMPLETED | OUTPATIENT
Start: 2022-11-05 | End: 2022-11-05

## 2022-11-05 RX ADMIN — SODIUM CHLORIDE 100 ML: 900 INJECTION INTRAVENOUS at 12:02

## 2022-11-05 RX ADMIN — IOPAMIDOL 125 ML: 755 INJECTION, SOLUTION INTRAVENOUS at 12:00

## 2022-11-05 RX ADMIN — GADOBUTROL 7 ML: 604.72 INJECTION INTRAVENOUS at 16:14

## 2022-11-05 ASSESSMENT — ENCOUNTER SYMPTOMS
DYSURIA: 1
FACIAL ASYMMETRY: 0
SPEECH DIFFICULTY: 0
DIZZINESS: 1
HEADACHES: 0

## 2022-11-05 ASSESSMENT — ACTIVITIES OF DAILY LIVING (ADL)
ADLS_ACUITY_SCORE: 35

## 2022-11-05 NOTE — ED PROVIDER NOTES
"  History   Chief Complaint:  Stroke Symptoms     The history is provided by the patient.      Edda Mckeon is a 72 year old female with history of NSTEMI, hypertension and hyperlipidemia who presents with concern for possible TIA. The patient reports that approximately 4 hours ago she had finished helping her disabled daughter go to the bathroom and was walking back to bed when she suddenly began to see flashes and noticed a darkening to her right visual field. At that time, she \"tipped over\" into her bed and felt slightly dizzy but did not have a headache. Afterwards, the patient became ataxic, drifting to one side and stumbling while trying to ambulate, which her son noticed as well. Notably, she was feeling well last night and into this morning other than having some nightmares and shaking while sleeping which she states is not abnormal. Presently, she endorses some persisting mild darkness in her right field of vision but denies any speech difficulty, facial droop, or loss of extremity function. The patient denies history of stroke but believes she might have had a TIA a couple of years ago.  She has had a history of migraines when she was in her 20s with a lot of visual spots and flashes.  No headache today.  She does note having a history of MI, having 2 stents placed 5 weeks ago, and had some angina earlier this morning which she took 1 nitroglycerin for.    Upon evaluation by stroke neurology, the patient endorses having some dysuria and congestion and is concerned for possible UTI or sinus infection.    Review of Systems   HENT: Positive for congestion.    Eyes: Positive for visual disturbance.   Genitourinary: Positive for dysuria.   Neurological: Positive for dizziness. Negative for facial asymmetry, speech difficulty and headaches.        (-) loss of extremity function  (+) ataxia   All other systems reviewed and are negative.        Allergies:  Atorvastatin  Hmg-Coa-R " Inhibitors  Pravastatin  Scopolamine    Medications:  Albuterol inhaler  Proventil neb solution  Levothyroxine  Toprol  Asmanex inhaler  Nitrostat  Crestor  Brilinta   Mycelex  Aspirin    Past Medical History:     Valvular heart disease  Mitral regurgitation  Severe asthma  Unstable angina   Hyperlipidemia  Hypothyroidism  GERD  Depression  LAILA  Fibromyalgia  Small bowel obstruction   NSTEMI  Anxiety   SNHL, bilateral  Nephrolithiasis   Lymphedema of both lower extremities  Posterior vitreous detachment, bilateral eyes  Bronchiectasis  Vocal fold polyp  Osteopenia  ADHD  Hashimoto's thyroiditis   Obesity  COPD  Hypertension    Past Surgical History:    Coronary angiogram  PCI  Left breast biopsy  Ligation or biopsy temporal artery  Cataract removal, bilateral      Family History:    Heart disease  Atrial fibrillation  Cancer  Cataract x2  Cerebrovascular disease x2  Dementia x2  Stroke x2  Thyroid disorder x2  Corneal dystrophy  Deafness   Glaucoma   Hyperlipidemia  CAD    Social History:  The patient presents to the ED with her  and son.  The patient arrived to the ED in a private vehicle.  PCP: Emi Monk     Physical Exam     Patient Vitals for the past 24 hrs:   BP Temp Temp src Pulse Resp SpO2 Height Weight   22 1445 (!) 144/72 -- -- 52 20 -- -- --   22 1417 -- -- -- -- 25 98 % -- --   22 1416 (!) 145/77 -- -- 53 18 -- -- --   22 1300 (!) 155/89 -- -- 60 18 -- -- --   22 1246 -- -- -- 53 28 93 % -- --   22 1245 (!) 140/64 -- -- 51 -- -- -- --   22 1230 135/60 -- -- (!) 49 11 96 % -- --   22 1217 -- -- -- 55 28 95 % -- --   22 1216 (!) 147/69 -- -- 56 (!) 33 -- -- --   22 1137 (!) 165/70 97.4  F (36.3  C) Temporal (!) 49 20 98 % 1.524 m (5') 75.8 kg (167 lb)     Physical Exam  Nursing note and vitals reviewed.    Constitutional:  Appears comfortable.    HENT:    Nose normal.  No discharge.      Oral mucosa is moist.  Eyes:     Conjunctivae are normal without injection.  Pupils are equal.  Visual fields are intact to movement.  She describes some darkening of the right visual field only out of the right eye.  Looks normal in the left eye and full visual fields.  Extraocular movements are intact.  Cardiovascular:  Normal rate, regular rhythm with normal S1 and S2.      Normal heart sounds and peripheral pulses 2+ and equal.       No murmur or josé manuel.  Pulmonary:  Effort normal and breath sounds clear to auscultation bilaterally.    No respiratory distress.  No stridor.     No wheezes. No rales.     GI:    Soft. No distension and no mass. No tenderness.      No rebound and no guarding. No flank pain.  No HSM.  Musculoskeletal:  Normal range of motion. No extremity deformity.     No edema and no tenderness.    Neurological:   GCS 15. NIH stroke scale score 2. O X 3.  No sensory deficit. Normal strength in all extremities.   Normal finger to nose. Normal heel-knee-shin. No hand drift. No leg drift.   Visual fields full but darkness described in the right visual field of the right eye only.  EOMs intact. No diplopia. No facial droop. No slurred speech.   Comprehension and expression of speech is normal. Mental status and memory normal.  Romberg is mildly positive and gait is somewhat wide-based and unsteady.  Skin:    Skin is warm and dry. No rash noted. No diaphoresis.      No erythema. No pallor.  No lesions.  Psychiatric:   Behavior is normal. Appropriate mood and affect.     Judgment and thought content normal.     National Institutes of Health Stroke Scale  Exam Interval: Baseline   Score    Level of consciousness: (0)   Alert, keenly responsive    LOC questions: (0)   Answers both questions correctly    LOC commands: (0)   Performs both tasks correctly    Best gaze: (0)   Normal    Visual: (1)   Darkness to right field    Facial palsy: (0)   Normal symmetrical movements    Motor arm (left): (0)   No drift    Motor arm (right): (0)   No  drift    Motor leg (left): (0)   No drift    Motor leg (right): (0)   No drift    Limb ataxia: (1)   Absent but Romberg positive and gait unsteady    Sensory: (0)   Normal- no sensory loss    Best language: (0)   Normal- no aphasia    Dysarthria: (0)   Normal    Extinction and inattention: (0)   No abnormality        Total Score:  2         Emergency Department Course   ECG  ECG results from 11/05/22   EKG 12-lead, tracing only     Value    Systolic Blood Pressure     Diastolic Blood Pressure     Ventricular Rate 57    Atrial Rate 57    IN Interval 172    QRS Duration 64        QTc 420    P Axis 78    R AXIS 65    T Axis 76    Interpretation ECG      Sinus bradycardia  Otherwise normal ECG  When compared with ECG of 09-OCT-2022 08:56,  T wave inversion no longer evident in Anterior leads  Confirmed by GENERATED REPORT, COMPUTER ([id number]),  AYSE PINK ([id number]) on 11/5/2022 11:56:45 AM       Imaging:  MR Brain w/o & w Contrast   Final Result   IMPRESSION:   1.  No acute intracranial process.   2.  Generalized brain atrophy and presumed microvascular ischemic changes as detailed above.      CT Head Perfusion w Contrast   Final Result   IMPRESSION:    CT PERFUSION:   1.  Normal cerebral perfusion.      CTA Head Neck w Contrast   Final Result   IMPRESSION:    HEAD CTA:    1.  Normal CTA [place] [facility] [place].      NECK CTA:   1.  Normal neck CTA.         CT Head w/o Contrast   Final Result   IMPRESSION:   1.  No CT evidence for acute intracranial process.   2.  Brain atrophy and presumed chronic microvascular ischemic changes as above.           Report per radiology    Laboratory:  Labs Ordered and Resulted from Time of ED Arrival to Time of ED Departure   BASIC METABOLIC PANEL - Abnormal       Result Value    Sodium 141      Potassium 3.7      Chloride 110 (*)     Carbon Dioxide (CO2) 26      Anion Gap 5      Urea Nitrogen 13      Creatinine 0.84      Calcium 9.3      Glucose 101 (*)     GFR Estimate 73     CBC WITH  PLATELETS AND DIFFERENTIAL - Abnormal    WBC Count 5.8      RBC Count 4.06      Hemoglobin 13.1      Hematocrit 41.4       (*)     MCH 32.3      MCHC 31.6      RDW 15.3 (*)     Platelet Count 279      % Neutrophils 63      % Lymphocytes 26      % Monocytes 9      % Eosinophils 1      % Basophils 1      % Immature Granulocytes 0      NRBCs per 100 WBC 0      Absolute Neutrophils 3.7      Absolute Lymphocytes 1.5      Absolute Monocytes 0.5      Absolute Eosinophils 0.1      Absolute Basophils 0.0      Absolute Immature Granulocytes 0.0      Absolute NRBCs 0.0     ROUTINE UA WITH MICROSCOPIC REFLEX TO CULTURE - Abnormal    Color Urine Light Yellow      Appearance Urine Clear      Glucose Urine Negative      Bilirubin Urine Negative      Ketones Urine Negative      Specific Gravity Urine >1.050 (*)     Blood Urine Negative      pH Urine 7.5 (*)     Protein Albumin Urine 10 (*)     Urobilinogen Urine Normal      Nitrite Urine Negative      Leukocyte Esterase Urine Negative      Bacteria Urine Few (*)     RBC Urine 1      WBC Urine 1      Squamous Epithelials Urine <1     INR - Normal    INR 1.00     PARTIAL THROMBOPLASTIN TIME - Normal    aPTT 29     TROPONIN I - Normal    Troponin I High Sensitivity 15     GLUCOSE MONITOR NURSING POCT      Emergency Department Course:    Reviewed:  I reviewed nursing notes, vitals, past medical history and Care Everywhere    Assessments:  1145 I obtained history and examined the patient as noted above.   1303 I rechecked the patient and explained findings.   1630 I rechecked the patient and explained findings. I believe that they are safe for discharge at this time.    Consults:  1203 I consulted with Dr. Doherty, stroke neurology, regarding the patient's history and presentation here in the emergency department.  1256 I re consulted with Dr. Doherty here in the ED.      Interventions:  1614 Gadavist 7 mL IV    Disposition:  The patient was discharged to home.     Impression &  Plan     Medical Decision Making:  Patient comes in with some odd constellation of symptoms including starting with visual flashes and spots and lights.  Not followed up with a headache.  She has a darkness in the right visual field of the right eye only.  She has no facial droop, she had a mildly positive Romberg and had some difficulty walking or ataxia but she described dizziness.  She has had some sinus congestion recently.  Because this was approximately 4 hours ago when she was last known well, I called a tier 2 code stroke and talked with neurology.  CT and CTA were all normal.  No evidence of sinus infection.  Labs show normal CBC, normal basic metabolic panel and glucose 101.  Neurology felt this could be a migraine equivalent without the headache because of her history of migraines in her 20s pain however MRI was obtained and did not show any acute findings.  There was some microvascular disease chronically noted.  The patient got up and ambulated really without much difficulty.  She felt a little unsteady.  With the dizziness, I am going to have her go home and do a trial of Dramamine and follow-up with her doctor this week.  If this was a migraine equivalent, her neuro symptoms should resolve by tomorrow.  I would like her seen in the clinic and she can return to the ER if she were to develop significant worsening of symptoms.    Critical care time minus procedures: 50 minutes    Push fluids,  some Dramamine and take a tablet every 6 hours as needed to see if it helps with your dizziness.  Follow-up with your doctor in the clinic this next week if your symptoms persist.  If you get dramatically worse and cannot walk and feel weak, return to the emergency room.  You can try Excedrin Migraine in the future if you develop the flashing lights in your visual field again to see if it helps.    Diagnosis:    ICD-10-CM    1. Visual disturbance  H53.9       2. Balance problems  R26.89       3. Dizziness   R42       4. Dehydration  E86.0         Scribe Disclosure:  I, Melva Fletcher, am serving as a scribe at 11:44 AM on 11/5/2022 to document services personally performed by Estefani Keith MD based on my observations and the provider's statements to me.            Estefani Keith MD  11/05/22 5136

## 2022-11-05 NOTE — ED TRIAGE NOTES
From home. Concerned for TIA due to unsteady walking, also having chest pain x2 hours, took sublingual nitroglycerine at home when pain started.      Triage Assessment     Row Name 11/05/22 1138       Triage Assessment (Adult)    Airway WDL WDL       Respiratory WDL    Respiratory WDL WDL       Skin Circulation/Temperature WDL    Skin Circulation/Temperature WDL WDL       Cardiac WDL    Cardiac WDL X;chest pain       Chest Pain Assessment    Duration 2 hours    Precipitating Factors at rest       Cognitive/Neuro/Behavioral WDL    Cognitive/Neuro/Behavioral WDL WDL

## 2022-11-05 NOTE — DISCHARGE INSTRUCTIONS
Push fluids,  some Dramamine and take a tablet every 6 hours as needed to see if it helps with your dizziness.  Follow-up with your doctor in the clinic this next week if your symptoms persist.  If you get dramatically worse and cannot walk and feel weak, return to the emergency room.  You can try Excedrin Migraine in the future if you develop the flashing lights in your visual field again to see if it helps.

## 2022-11-05 NOTE — CONSULTS
[facility]    Stroke Consult Note    Reason for Consult: Stroke Code     Chief Complaint: Stroke Symptoms and Chest Pain      HPI   I was called by Estefani Keith on 11/05/22 regarding patient Edda Mckeon. The patient is a 72 year old female with MI 5 weeks with stents placed has chest tightness.   Had visual flashing lights and now has monocular right visual field right eye only seemed darker than the left side with veering to the left with gait. She has normal finger to nose and full visual field on ED assessment.   NIHSS: 2 for vision   PMHx: She has a history of migraines with aura starting since childhood, she describes flashing lights in her visual fields. Her migraines changed over time. This presentation was different as the visual changes were like a shutter opening and closing.   LKN:Onset at 0800 this morning she was lying in bed and got up to help her daughter.       Imaging Findings  Head Ct/CTACTP  IMPRESSION:  1.  No CT evidence for acute intracranial process.  2.  Brain atrophy and presumed chronic microvascular ischemic changes as above.                                                                     IMPRESSION:   HEAD CTA:   1.  Normal CTA [place] [facility] [place].     NECK CTA:  1.  Normal neck CTA.                                                                       IMPRESSION:   CT PERFUSION:  1.  Normal cerebral perfusion.    Intravenous Thrombolysis  Not given due to:   - minor/isolated/quickly resolving symptoms  - stroke mimic: Complex migraine    Endovascular Treatment  Not initiated due to absence of proximal vessel occlusion    Impression   Transient monocular darker vision- unclear etiology but is monocular and isn't complete vision loss that would be typical of a TIA or stroke. She has chest pain/pressure, headache with sinus pressure and dysuria. She could have an underlying infection resulting in feeling unwell. Other possibilities are complex  "migraine vs less likely seizure.         Recommendations      MRI brain WWO if willing   Consider opthalmology work-up outpatient  Continue home meds    Patient Follow-up     - final recommendation pending work-up    Thank you for this consult. No further stroke evaluation is recommended, so we will sign off. Please contact us with any additional questions.     The Stroke Staff is Dr. Ulloa.    Emily Doherty MD  Vascular Neurology Fellow  To page me or covering stroke neurology team member, click here: [facility]COM   Choose \"On Call\" tab at top, then search dropdown box for \"Neurology Adult\", select location, press Enter, then look for stroke/neuro ICU/telestroke.    ______________________________________________________    Clinically Significant Risk Factors Present on Admission                # Drug Induced Platelet Defect: home medication list includes an antiplatelet medication          Past Medical History   Past Medical History:   Diagnosis Date     Palpitations      Shortness of breath      Past Surgical History   Past Surgical History:   Procedure Laterality Date     CV CORONARY ANGIOGRAM N/A 9/19/2022    Procedure: Coronary Angiogram;  Surgeon: Rozina Tafoya MD;  Location: [facility] CARDIAC CATH LAB     CV PCI N/A 9/19/2022    Procedure: Percutaneous Coronary Intervention;  Surgeon: Rozina Tafoya MD;  Location: [facility] CARDIAC CATH LAB     Medications   Home Meds  Prior to Admission medications    Medication Sig Start Date End Date Taking? Authorizing Provider   albuterol (PROAIR HFA/PROVENTIL HFA/VENTOLIN HFA) 108 (90 Base) MCG/ACT inhaler Inhale 1-2 puffs into the lungs every 6 hours 9/22/22   Russ Trevino MD   albuterol (PROAIR HFA/PROVENTIL HFA/VENTOLIN HFA) 108 (90 Base) MCG/ACT inhaler Inhale 1-2 puffs into the lungs every 6 hours as needed for shortness of breath / dyspnea or wheezing    Reported, Patient   albuterol (PROVENTIL) (2.5 MG/3ML) 0.083% neb solution Take 1 vial (2.5 mg) by " nebulization every 6 hours as needed for shortness of breath / dyspnea or wheezing 9/22/22   Russ Trevino MD   aspirin (ASA) 81 MG chewable tablet Take 1 tablet (81 mg) by mouth daily Starting tomorrow. 9/20/22   Rozina Tafoya MD   Cholecalciferol (VITAMIN D) 2000 units tablet Take 2,000 Units by mouth daily    Reported, Patient   coenzyme Q-10 (CO-Q10) 50 MG capsule Take 50 mg by mouth daily    Unknown, Entered By History   fish oil-omega-3 fatty acids 1000 MG capsule Take 2 g by mouth every other day    Reported, Patient   Gluc-Chonn-MSM-Boswellia-Vit D (GLUCOS CHONDROIT, BOSWELLIA,) TABS Take by mouth daily    Unknown, Entered By History   levothyroxine (SYNTHROID/LEVOTHROID) 75 MCG tablet Take 75 mcg by mouth daily 4/26/18   Reported, Patient   metoprolol succinate ER (TOPROL-XL) 25 MG 24 hr tablet Take 1 tablet (25 mg) by mouth daily 7/30/21   Ariadne Boss MD   mometasone furoate (ASMANEX HFA) 200 MCG/ACT inhaler Inhale 2 puffs into the lungs 2 times daily 9/12/18 9/18/22  Reported, Patient   Multiple vitamin TABS Take 1 tablet by mouth daily 12/3/07   Reported, Patient   nitroGLYcerin (NITROSTAT) 0.4 MG sublingual tablet For chest pain place 1 tablet under the tongue every 5 minutes for 3 doses. If symptoms persist 5 minutes after 1st dose call 911. 9/20/22   Alma Shaffer MD   order for DME Oxygen for home use. 2 liters per NC during daytime, 4 at night. Frequency: Continuous with portability.; Length of need: 99  Months. 5/30/17   Reported, Patient   PAPAYA ENZYME PO Take by mouth daily    Unknown, Entered By History   polyvinyl alcohol (LIQUIFILM TEARS) 1.4 % ophthalmic solution Place 2 drops into both eyes as needed for dry eyes    Reported, Patient   rosuvastatin (CRESTOR) 40 MG tablet Take 40 mg by mouth daily 2/16/18   Reported, Patient   ticagrelor (BRILINTA) 90 MG tablet Take 1 tablet (90 mg) by mouth every 12 hours 9/20/22   Alma Shaffer MD       Scheduled Meds      Infusion  Meds      PRN Meds      Allergies   Allergies   Allergen Reactions     Atorvastatin      Muscle aches     Hmg-Coa-R Inhibitors      Muscle aches     Pravastatin      Muscle aches     Scopolamine Nausea and Vomiting and Swelling     Family History   No family history on file.  Social History   Social History     Tobacco Use     Smoking status: Never     Smokeless tobacco: Never   Substance Use Topics     Alcohol use: Yes     Comment: occs     Drug use: No       Review of Systems   Review of systems not obtained due to patient factors - critical condition       PHYSICAL EXAMINATION  Temp:  [97.4  F (36.3  C)] 97.4  F (36.3  C)  Pulse:  [49-56] 53  Resp:  [11-33] 28  BP: (135-165)/(60-70) 140/64  SpO2:  [93 %-98 %] 93 %     Neurologic  Mental Status:  alert, oriented x 3, follows commands, speech clear and fluent  Cranial Nerves:  visual fields intact, EOMI with normal smooth pursuit, facial sensation intact and symmetric, facial movements symmetric, hearing not formally tested but intact to conversation, no dysarthria, shoulder shrug strong bilaterally, tongue protrusion midline  Motor:  normal muscle tone and bulk, no abnormal movements, able to move all limbs spontaneously, strength 5/5 throughout upper and lower extremities, no pronator drift  Reflexes:  toes down-going  Sensory:  light touch sensation intact and symmetric throughout upper and lower extremities, no extinction on double simultaneous stimulation   Coordination:  normal finger-to-nose and heel-to-shin bilaterally without dysmetria, rapid alternating movements symmetric  Station/Gait:  Wide based gait but able to walk unassisted    Dysphagia Screen  Per Nursing    Stroke Scales    NIHSS  1a. Level of Consciousness 0-->Alert, keenly responsive   1b. LOC Questions 0-->Answers both questions correctly   1c. LOC Commands 0-->Performs both tasks correctly   2.   Best Gaze 0-->Normal   3.   Visual 0-->No visual loss   4.   Facial Palsy 0-->Normal symmetrical  movements   5a. Motor Arm, Left 0-->No drift, limb holds 90 (or 45) degrees for full 10 secs   5b. Motor Arm, Right 0-->No drift, limb holds 90 (or 45) degrees for full 10 secs   6a. Motor Leg, Left 0-->No drift, leg holds 30 degree position for full 5 secs   6b. Motor Leg, right 0-->No drift, leg holds 30 degree position for full 5 secs   7.   Limb Ataxia 0-->Absent   8.   Sensory 0-->Normal, no sensory loss   9.   Best Language 0-->No aphasia, normal   10. Dysarthria 0-->Normal   11. Extinction and Inattention  0-->No abnormality   Total 0 (11/05/22 1220)       Modified Bailee Score (Pre-morbid)  0    Imaging  I personally reviewed all imaging; relevant findings per HPI.     Lab Results Data   CBC  Recent Labs   Lab 11/05/22  1155   WBC 5.8   RBC 4.06   HGB 13.1   HCT 41.4        Basic Metabolic Panel    Recent Labs   Lab 11/05/22  1155      POTASSIUM 3.7   CHLORIDE 110*   CO2 26   BUN 13   CR 0.84   *   ELY 9.3     Liver Panel  No results for input(s): PROTTOTAL, ALBUMIN, BILITOTAL, ALKPHOS, AST, ALT, BILIDIRECT in the last 168 hours.  INR    Recent Labs   Lab Test 11/05/22  1155 09/18/22  1830   INR 1.00 1.01      Lipid Profile    Recent Labs   Lab Test 09/19/22  0420   CHOL 159   HDL 76   LDL 71   TRIG 58     A1C  No lab results found.  Troponin    Recent Labs   Lab 11/05/22  1155   TROPONINIS 15          Stroke Code Data Data   Stroke Code Data  (for stroke code without tele)  Stroke code activated 11/05/22   1153   First stroke provider response 11/05/22   1220   Last known normal 11/05/22   0800   Time of discovery   (or onset of symptoms) 11/05/22   0800   Head CT read by Stroke Neuro Dr/Provider 11/05/22   1209   Was stroke code de-escalated? Yes 11/05/22 7004

## 2022-11-09 ENCOUNTER — HOSPITAL ENCOUNTER (OUTPATIENT)
Dept: CARDIAC REHAB | Facility: CLINIC | Age: 72
Discharge: HOME | End: 2022-11-09
Attending: INTERNAL MEDICINE
Payer: COMMERCIAL

## 2022-11-09 PROCEDURE — 93798 PHYS/QHP OP CAR RHAB W/ECG: CPT | Performed by: OCCUPATIONAL THERAPIST

## 2022-11-09 PROCEDURE — 93797 PHYS/QHP OP CAR RHAB WO ECG: CPT | Mod: 59 | Performed by: OCCUPATIONAL THERAPIST

## 2022-11-11 ENCOUNTER — HOSPITAL ENCOUNTER (OUTPATIENT)
Dept: CARDIAC REHAB | Facility: CLINIC | Age: 72
Discharge: HOME | End: 2022-11-11
Attending: INTERNAL MEDICINE
Payer: COMMERCIAL

## 2022-11-11 PROCEDURE — 93798 PHYS/QHP OP CAR RHAB W/ECG: CPT | Performed by: OCCUPATIONAL THERAPIST

## 2022-11-16 ENCOUNTER — OFFICE VISIT (OUTPATIENT)
Dept: CARDIOLOGY | Facility: CLINIC | Age: 72
End: 2022-11-16
Attending: PHYSICIAN ASSISTANT
Payer: COMMERCIAL

## 2022-11-16 VITALS
SYSTOLIC BLOOD PRESSURE: 131 MMHG | BODY MASS INDEX: 32.49 KG/M2 | HEART RATE: 58 BPM | DIASTOLIC BLOOD PRESSURE: 84 MMHG | HEIGHT: 60 IN | WEIGHT: 165.5 LBS

## 2022-11-16 DIAGNOSIS — R00.2 PALPITATIONS: ICD-10-CM

## 2022-11-16 DIAGNOSIS — E78.5 HYPERLIPIDEMIA LDL GOAL <70: ICD-10-CM

## 2022-11-16 DIAGNOSIS — I25.10 CORONARY ARTERY DISEASE INVOLVING NATIVE CORONARY ARTERY OF NATIVE HEART WITHOUT ANGINA PECTORIS: ICD-10-CM

## 2022-11-16 DIAGNOSIS — J45.909 SEVERE ASTHMA, UNSPECIFIED WHETHER COMPLICATED, UNSPECIFIED WHETHER PERSISTENT: ICD-10-CM

## 2022-11-16 DIAGNOSIS — I21.4 NSTEMI (NON-ST ELEVATED MYOCARDIAL INFARCTION) (H): Primary | ICD-10-CM

## 2022-11-16 PROCEDURE — 99214 OFFICE O/P EST MOD 30 MIN: CPT | Performed by: NURSE PRACTITIONER

## 2022-11-16 RX ORDER — CLOPIDOGREL BISULFATE 75 MG/1
75 TABLET ORAL DAILY
Qty: 94 TABLET | Refills: 3 | Status: SHIPPED | OUTPATIENT
Start: 2022-11-16 | End: 2023-08-30

## 2022-11-16 RX ORDER — NITROGLYCERIN 0.4 MG/1
TABLET SUBLINGUAL
Qty: 30 TABLET | Refills: 1 | Status: SHIPPED | OUTPATIENT
Start: 2022-11-16 | End: 2023-07-13

## 2022-11-16 NOTE — LETTER
11/16/2022    Emi Monk MD  [facility] [address] [place] [address]    RE: Edda Mckeon       Dear Colleague,     I had the pleasure of seeing Edda Mckeon in the [facility].  Cardiology Clinic Progress Note  Edda Mckeon MRN# [id number]   YOB: 1950 Age: 72 year old     Reason For Visit:  Hospital follow-up   Primary Cardiologist:   Dr. Chanel          History of Presenting Illness:      Edda Mckeon is a pleasant 72 year old patient who carries a past medical history significant for asthma, bronchiectasis, mitral regurgitation, dyslipidemia, palpitations, hypothyroidism and obstructive sleep apnea on CPAP.    On 9/18/2022 she presented to the emergency room with unstable angina.  She was evaluated the day before at [facility] emergency room that showed a mildly positive troponin.  Chest CT was negative for PE.  Unfortunately she left AMA.  Her ER work-up at [facility] showed an abnormal EKG with T wave inversions in the anterior leads.  Echocardiogram showed a normal ejection fraction estimated at 55 to 60%, basal to mid inferior lateral, apical lateral, mid apical inferior hypokinesis, grade 1 diastolic dysfunction, and mild to moderate TR.  She subsequently underwent a coronary angiogram that showed severe two-vessel coronary disease status post IVUS guided PCI of the proximal LAD using a 3.0 x 20 mm drug-eluting stent and PCI of the RCA using a 3.0 x 16 mm drug-eluting stent.  She was initiated on Brilinta, metoprolol, and statin.  Unfortunately, she developed COVID 1 week after discharge.    She returned to the emergency room on 10/9/2022 with complaints of jaw pain, fatigue and arm discomfort.  Her cardiac work-up was negative.  Jaw pain was reproducible with palpitation.  She did note episodes of elevated heart rates and was discharged on a 48-hour Holter monitor showing normal sinus rhythm, average heart rate of 60 bpm and a  3% SVE burden.    On 11/5/2022 she returned to the emergency room with visual changes, difficulty walking and dizziness.  CT and CTA of the head and neck were normal.  She was evaluated by neurology who felt this was likely a migraine equivalent.    Today, she presents with her  and daughter for hospital follow-up.  She has multiple complaints consisting of significant shortness of breath and mild chest tightness since PCI, abdominal pain, and hair loss.  She denies palpitations, PND, orthopnea, presyncope, or leg edema.  Upon exam, expiratory wheezes heard bilaterally, heart rate and rhythm regular, no neck vein distention, abdominal distention, or leg edema.  She admits to being under a great deal of personal stress which causes an increase in anxiety.  She currently sees a therapist.  .    Blood pressure is well controlled at 131/84, heart rate mildly bradycardic at 58  Lipid panel showed a total cholesterol of 159, HDL 76, LDL 71, and triglycerides 58  BMI 32.32    She is currently enrolled in cardiac rehab which she attends 2 times per week.  Otherwise, she is primarily sedentary and is only able to walk approximately 1500 steps due to shortness of breath.    Follows a low-sodium diet  Remains compliant with all medications.                   Assessment and Plan:     1.  Coronary artery disease -status post PCI of the proximal LAD using a 3.0 x 20 mm drug-eluting stent and PCI of the RCA using a 3.0 x 16 mm drug-eluting stent.  Due to worsening shortness of breath since PCI, I recommend switching Brilinta to Plavix with a 300 mg loading dose x1 followed by 75 mg daily x12 months post PCI.  Continue low-dose aspirin, metoprolol, and statin.  Encourage continued cardiac rehab, exercise, weight loss, and heart healthy diet.  Monitor for any episodes of chest pain requiring sublingual nitroglycerin.  If symptoms are severe seek ER evaluation immediately.    2.  Palpitations -resolved.  48-hour Holter monitor  showed normal sinus rhythm, average heart rate of 60 bpm and a 3% SVE burden.    3.  Hyperlipidemia -on statin  4.  Hypothyroidism -follow-up with PCP for further management and assessment of hair loss  5.  Obstructive sleep apnea -on CPAP  6.  Anxiety -follow-up with PCP for further management.  7. Asthma - Encourage inhaler and nebulizer use as instruced              Thank you for allowing me to participate in this delightful patient's care. I have recommended she follow up in 2 months with [facility] for reassessment.       Vannesa Kenny, APRN CNP         Review of Systems:     Review of Systems:  Skin:        Eyes:       ENT:       Respiratory:  Positive for shortness of breath  Cardiovascular:    Positive for;chest pain;palpitations;lightheadedness;dizziness  Gastroenterology: Negative    Genitourinary:       Musculoskeletal:       Neurologic:       Psychiatric:       Heme/Lymph/Imm:  Positive for allergies  Endocrine:  Positive for thyroid disorder              Physical Exam:     GEN:  In general, this is a overweight female in no acute distress.  HEENT:  Pupils equal, round. Sclerae nonicteric. Clear oropharynx. Mucous membranes moist.  NECK:  No JVD   C/V:  Regular rate and rhythm, no murmur, rub or gallop. No S3 or RV heave.   RESP: Respirations are unlabored. No use of accessory muscles.  Expiratory wheezes bilaterally   GI: Abdomen soft, nontender, nondistended. No HSM appreciated.   EXTREM: No LE edema. No cyanosis or clubbing.  NEURO: Alert and oriented, cooperative. No obvious focal deficits.   PSYCH: Normal affect.  SKIN: Warm and dry. No rashes or petechiae appreciated.          Past Medical History:     Past Medical History:   Diagnosis Date     Palpitations      Shortness of breath               Past Surgical History:     Past Surgical History:   Procedure Laterality Date     CV CORONARY ANGIOGRAM N/A 9/19/2022    Procedure: Coronary Angiogram;  Surgeon: Rozina Tafoya MD;  Location:  HEART CARDIAC  CATH LAB     CV PCI N/A 9/19/2022    Procedure: Percutaneous Coronary Intervention;  Surgeon: Rozina Tafoya MD;  Location:  HEART CARDIAC CATH LAB              Allergies:   Atorvastatin, Hmg-coa-r inhibitors, Pravastatin, and Scopolamine       Data:   All laboratory data reviewed:    LAST CHOLESTEROL:  Lab Results   Component Value Date    CHOL 159 09/19/2022     Lab Results   Component Value Date    HDL 76 09/19/2022     Lab Results   Component Value Date    LDL 71 09/19/2022     Lab Results   Component Value Date    TRIG 58 09/19/2022     No results found for: CHOLHDLRATIO    LAST BMP:  Lab Results   Component Value Date     11/05/2022     11/11/2019      Lab Results   Component Value Date    POTASSIUM 3.7 11/05/2022    POTASSIUM 3.8 11/11/2019     Lab Results   Component Value Date    CHLORIDE 110 11/05/2022    CHLORIDE 105 11/11/2019     Lab Results   Component Value Date    ELY 9.3 11/05/2022    ELY 8.9 11/11/2019     Lab Results   Component Value Date    CO2 26 11/05/2022    CO2 29 11/11/2019     Lab Results   Component Value Date    BUN 13 11/05/2022    BUN 14 11/11/2019     Lab Results   Component Value Date    CR 0.84 11/05/2022    CR 0.78 11/11/2019     Lab Results   Component Value Date     11/05/2022    GLC 88 11/11/2019       LAST CBC:  Lab Results   Component Value Date    WBC 5.8 11/05/2022    WBC 6.5 11/11/2019     Lab Results   Component Value Date    RBC 4.06 11/05/2022    RBC 4.23 11/11/2019     Lab Results   Component Value Date    HGB 13.1 11/05/2022    HGB 13.3 11/11/2019     Lab Results   Component Value Date    HCT 41.4 11/05/2022    HCT 42.5 11/11/2019     Lab Results   Component Value Date     11/05/2022     11/11/2019     Lab Results   Component Value Date    MCH 32.3 11/05/2022    MCH 31.4 11/11/2019     Lab Results   Component Value Date    MCHC 31.6 11/05/2022    MCHC 31.3 11/11/2019     Lab Results   Component Value Date    RDW 15.3 11/05/2022     RDW 14.5 11/11/2019     Lab Results   Component Value Date     11/05/2022     11/11/2019   Thank you for allowing me to participate in the care of your patient.      Sincerely,     ASHELY Gan [facility]  cc:   Keiko Will PA-C  [address]  [place]  [place] [address]

## 2022-11-16 NOTE — PATIENT INSTRUCTIONS
Thanks for participating in a office visit with the [facility] today.    Shortness of breath, likely Brilinta related.   Stop Brilinta and transition to Plavix with a 300 mg loading dose  x 1, then 75 mg daily x 12 months uninterrupted then aspirin alone indefinitely.  Continue on all other medications  Encourage exercise and weight loss.   Strict low salt diet.   Encourage cardiac rehab  Recommend follow up with PCP for anxiety    Follow up in 2 months with [facility]     Please call my nurse at  [phone] with any questions or concerns.    Scheduling phone number: [phone]  Reminder: Please bring in all current medications, over the counter supplements and vitamin bottles to your next appointment.

## 2022-11-16 NOTE — PROGRESS NOTES
Cardiology Clinic Progress Note  Edda Mckeon MRN# [id number]   YOB: 1950 Age: 72 year old     Reason For Visit:  Hospital follow-up   Primary Cardiologist:   Dr. Chanel          History of Presenting Illness:      Edda Mckeon is a pleasant 72 year old patient who carries a past medical history significant for asthma, bronchiectasis, mitral regurgitation, dyslipidemia, palpitations, hypothyroidism and obstructive sleep apnea on CPAP.    On 9/18/2022 she presented to the emergency room with unstable angina.  She was evaluated the day before at [facility] emergency room that showed a mildly positive troponin.  Chest CT was negative for PE.  Unfortunately she left AMA.  Her ER work-up at [facility] showed an abnormal EKG with T wave inversions in the anterior leads.  Echocardiogram showed a normal ejection fraction estimated at 55 to 60%, basal to mid inferior lateral, apical lateral, mid apical inferior hypokinesis, grade 1 diastolic dysfunction, and mild to moderate TR.  She subsequently underwent a coronary angiogram that showed severe two-vessel coronary disease status post IVUS guided PCI of the proximal LAD using a 3.0 x 20 mm drug-eluting stent and PCI of the RCA using a 3.0 x 16 mm drug-eluting stent.  She was initiated on Brilinta, metoprolol, and statin.  Unfortunately, she developed COVID 1 week after discharge.    She returned to the emergency room on 10/9/2022 with complaints of jaw pain, fatigue and arm discomfort.  Her cardiac work-up was negative.  Jaw pain was reproducible with palpitation.  She did note episodes of elevated heart rates and was discharged on a 48-hour Holter monitor showing normal sinus rhythm, average heart rate of 60 bpm and a 3% SVE burden.    On 11/5/2022 she returned to the emergency room with visual changes, difficulty walking and dizziness.  CT and CTA of the head and neck were normal.  She was evaluated by neurology who felt this was likely a migraine  equivalent.    Today, she presents with her  and daughter for hospital follow-up.  She has multiple complaints consisting of significant shortness of breath and mild chest tightness since PCI, abdominal pain, and hair loss.  She denies palpitations, PND, orthopnea, presyncope, or leg edema.  Upon exam, expiratory wheezes heard bilaterally, heart rate and rhythm regular, no neck vein distention, abdominal distention, or leg edema.  She admits to being under a great deal of personal stress which causes an increase in anxiety.  She currently sees a therapist.  .    Blood pressure is well controlled at 131/84, heart rate mildly bradycardic at 58  Lipid panel showed a total cholesterol of 159, HDL 76, LDL 71, and triglycerides 58  BMI 32.32    She is currently enrolled in cardiac rehab which she attends 2 times per week.  Otherwise, she is primarily sedentary and is only able to walk approximately 1500 steps due to shortness of breath.    Follows a low-sodium diet  Remains compliant with all medications.                   Assessment and Plan:     1.  Coronary artery disease -status post PCI of the proximal LAD using a 3.0 x 20 mm drug-eluting stent and PCI of the RCA using a 3.0 x 16 mm drug-eluting stent.  Due to worsening shortness of breath since PCI, I recommend switching Brilinta to Plavix with a 300 mg loading dose x1 followed by 75 mg daily x12 months post PCI.  Continue low-dose aspirin, metoprolol, and statin.  Encourage continued cardiac rehab, exercise, weight loss, and heart healthy diet.  Monitor for any episodes of chest pain requiring sublingual nitroglycerin.  If symptoms are severe seek ER evaluation immediately.    2.  Palpitations -resolved.  48-hour Holter monitor showed normal sinus rhythm, average heart rate of 60 bpm and a 3% SVE burden.    3.  Hyperlipidemia -on statin  4.  Hypothyroidism -follow-up with PCP for further management and assessment of hair loss  5.  Obstructive sleep apnea -on  CPAP  6.  Anxiety -follow-up with PCP for further management.  7. Asthma - Encourage inhaler and nebulizer use as instruced              Thank you for allowing me to participate in this delightful patient's care. I have recommended she follow up in 2 months with [facility] for reassessment.       ASHELY Gan CNP         Review of Systems:     Review of Systems:  Skin:        Eyes:       ENT:       Respiratory:  Positive for shortness of breath  Cardiovascular:    Positive for;chest pain;palpitations;lightheadedness;dizziness  Gastroenterology: Negative    Genitourinary:       Musculoskeletal:       Neurologic:       Psychiatric:       Heme/Lymph/Imm:  Positive for allergies  Endocrine:  Positive for thyroid disorder              Physical Exam:     GEN:  In general, this is a overweight female in no acute distress.  HEENT:  Pupils equal, round. Sclerae nonicteric. Clear oropharynx. Mucous membranes moist.  NECK:  No JVD   C/V:  Regular rate and rhythm, no murmur, rub or gallop. No S3 or RV heave.   RESP: Respirations are unlabored. No use of accessory muscles.  Expiratory wheezes bilaterally   GI: Abdomen soft, nontender, nondistended. No HSM appreciated.   EXTREM: No LE edema. No cyanosis or clubbing.  NEURO: Alert and oriented, cooperative. No obvious focal deficits.   PSYCH: Normal affect.  SKIN: Warm and dry. No rashes or petechiae appreciated.          Past Medical History:     Past Medical History:   Diagnosis Date     Palpitations      Shortness of breath               Past Surgical History:     Past Surgical History:   Procedure Laterality Date     CV CORONARY ANGIOGRAM N/A 9/19/2022    Procedure: Coronary Angiogram;  Surgeon: Rozina Tafoya MD;  Location:  HEART CARDIAC CATH LAB     CV PCI N/A 9/19/2022    Procedure: Percutaneous Coronary Intervention;  Surgeon: Rozina Tafoya MD;  Location:  HEART CARDIAC CATH LAB              Allergies:   Atorvastatin, Hmg-coa-r inhibitors, Pravastatin, and  Scopolamine       Data:   All laboratory data reviewed:    LAST CHOLESTEROL:  Lab Results   Component Value Date    CHOL 159 09/19/2022     Lab Results   Component Value Date    HDL 76 09/19/2022     Lab Results   Component Value Date    LDL 71 09/19/2022     Lab Results   Component Value Date    TRIG 58 09/19/2022     No results found for: CHOLHDLRATIO    LAST BMP:  Lab Results   Component Value Date     11/05/2022     11/11/2019      Lab Results   Component Value Date    POTASSIUM 3.7 11/05/2022    POTASSIUM 3.8 11/11/2019     Lab Results   Component Value Date    CHLORIDE 110 11/05/2022    CHLORIDE 105 11/11/2019     Lab Results   Component Value Date    ELY 9.3 11/05/2022    ELY 8.9 11/11/2019     Lab Results   Component Value Date    CO2 26 11/05/2022    CO2 29 11/11/2019     Lab Results   Component Value Date    BUN 13 11/05/2022    BUN 14 11/11/2019     Lab Results   Component Value Date    CR 0.84 11/05/2022    CR 0.78 11/11/2019     Lab Results   Component Value Date     11/05/2022    GLC 88 11/11/2019       LAST CBC:  Lab Results   Component Value Date    WBC 5.8 11/05/2022    WBC 6.5 11/11/2019     Lab Results   Component Value Date    RBC 4.06 11/05/2022    RBC 4.23 11/11/2019     Lab Results   Component Value Date    HGB 13.1 11/05/2022    HGB 13.3 11/11/2019     Lab Results   Component Value Date    HCT 41.4 11/05/2022    HCT 42.5 11/11/2019     Lab Results   Component Value Date     11/05/2022     11/11/2019     Lab Results   Component Value Date    MCH 32.3 11/05/2022    MCH 31.4 11/11/2019     Lab Results   Component Value Date    MCHC 31.6 11/05/2022    MCHC 31.3 11/11/2019     Lab Results   Component Value Date    RDW 15.3 11/05/2022    RDW 14.5 11/11/2019     Lab Results   Component Value Date     11/05/2022     11/11/2019

## 2022-11-21 ENCOUNTER — TELEPHONE (OUTPATIENT)
Dept: CARDIOLOGY | Facility: CLINIC | Age: 72
End: 2022-11-21

## 2022-11-21 NOTE — TELEPHONE ENCOUNTER
[facility] Call Center    Phone Message    May a detailed message be left on voicemail: yes     Reason for Call: Other: Pt would like a call back asap as she switched medication and a few symptoms have accured and she is wondering if  its the medicaiton as she has lost her voice and would like to discuss asap     Action Taken: Message routed to:  [facility] ([facility] Cardio    Travel Screening: Not Applicable

## 2022-11-22 NOTE — TELEPHONE ENCOUNTER
Call received from pt to review recent sxs. Pt reports she has had a strained voice for 2 days. Notably hard to talk via phone. Pt concerned that her Plavix was a contributing factor as she recently changed to this medication. Pt advised change in medication likely unrelated. Pt reports she has been at some recent social events and concerned she may have caught something. Pt advised to continue Plavix as it is unlikely the cause. Pt advised to contact PMD office with sxs as sxs consistent with viral etiology.   JULIET Moctezuma RN, BSN. 11/22/22 9:18 AM

## 2022-12-07 ENCOUNTER — HOSPITAL ENCOUNTER (OUTPATIENT)
Dept: CARDIAC REHAB | Facility: CLINIC | Age: 72
Discharge: HOME | End: 2022-12-07
Attending: INTERNAL MEDICINE
Payer: COMMERCIAL

## 2022-12-07 PROCEDURE — 93798 PHYS/QHP OP CAR RHAB W/ECG: CPT | Performed by: OCCUPATIONAL THERAPIST

## 2022-12-12 ENCOUNTER — HOSPITAL ENCOUNTER (OUTPATIENT)
Dept: CARDIAC REHAB | Facility: CLINIC | Age: 72
Discharge: HOME | End: 2022-12-12
Attending: INTERNAL MEDICINE
Payer: COMMERCIAL

## 2022-12-12 PROCEDURE — 93797 PHYS/QHP OP CAR RHAB WO ECG: CPT | Performed by: REHABILITATION PRACTITIONER

## 2022-12-12 PROCEDURE — 93798 PHYS/QHP OP CAR RHAB W/ECG: CPT | Performed by: REHABILITATION PRACTITIONER

## 2022-12-14 ENCOUNTER — HOSPITAL ENCOUNTER (OUTPATIENT)
Dept: CARDIAC REHAB | Facility: CLINIC | Age: 72
Discharge: HOME | End: 2022-12-14
Attending: INTERNAL MEDICINE
Payer: COMMERCIAL

## 2022-12-14 PROCEDURE — 93798 PHYS/QHP OP CAR RHAB W/ECG: CPT | Performed by: REHABILITATION PRACTITIONER

## 2023-01-03 DIAGNOSIS — I47.10 PAROXYSMAL SUPRAVENTRICULAR TACHYCARDIA (H): ICD-10-CM

## 2023-01-03 RX ORDER — METOPROLOL SUCCINATE 25 MG/1
25 TABLET, EXTENDED RELEASE ORAL DAILY
Qty: 90 TABLET | Refills: 3 | Status: SHIPPED | OUTPATIENT
Start: 2023-01-03 | End: 2024-03-04

## 2023-01-03 NOTE — TELEPHONE ENCOUNTER
Received refill request for:  Metoprolol    [facility] Cardiology Refill Guideline reviewed.  Medication meets criteria for refill.    Yola Quintanilla RN, BSN  01/03/23 at 2:42 PM

## 2023-01-17 ENCOUNTER — TELEPHONE (OUTPATIENT)
Dept: CARDIOLOGY | Facility: CLINIC | Age: 73
End: 2023-01-17
Payer: COMMERCIAL

## 2023-01-17 NOTE — TELEPHONE ENCOUNTER
[facility] Call Center    Phone Message    May a detailed message be left on voicemail: yes     Reason for Call: Other: Pt has been in pain and not been able to go to the dentist since the stents where placed and she is wondering if there is anything that she can do about going to the dentist to get reflief.Please reach out ot the Pt to discuss further.     Action Taken: Other: [facility]    Travel Screening: Not Applicable     Thank you!  [facility]

## 2023-01-18 NOTE — TELEPHONE ENCOUNTER
"Called pt to review recommendations below.     Pt states she's needed a tooth pulled for a year. Now causing more pain. Pt is not sure if she has infection. Advised pt to see the dentist and have them assess her. Pt will call back if needing to review medication hold.         \"Patient is ok to go to the dentist after 3 months post PCI.   She cannot interrupt her DAPT due to recent stenting.   If dental work is urgent, will discuss with interventionalist to see if DAPT can be interrupted.     Thanks   Vannesa\"  "

## 2023-02-20 NOTE — TELEPHONE ENCOUNTER
Patient called wanting to let us know that she has thrush and asking for recommendations on an oral surgeon.     Pt states she still has tingling and burning in her mouth. Pt also states some of her teeth are broken. Highly advised pt to call PMD regarding her sxs and also see her dentist ASAP. Explained to patient if she needs dental work to let us know prior and we can review with Cardiologist. Pt states she is okay right now just wanted to let us know.     Pt has OV on 3/23/23 with Vannesa Daniels RN

## 2023-02-27 ENCOUNTER — TELEPHONE (OUTPATIENT)
Dept: CARDIOLOGY | Facility: CLINIC | Age: 73
End: 2023-02-27
Payer: COMMERCIAL

## 2023-02-27 NOTE — TELEPHONE ENCOUNTER
[facility] Call Center    Phone Message    May a detailed message be left on voicemail: yes     Reason for Call: Symptoms or Concerns     If patient has red-flag symptoms, warm transfer to triage line    Current symptom or concern: SOB    Symptoms have been present for:  3 day(s)    Has patient previously been seen for this? Yes    By: Vannesa Kenny APRN CNP    Date: 11/16/22    Are there any new or worsening symptoms? Yes:       Action Taken: Other: cardio    Travel Screening: Not Applicable     Thank you!  [facility]

## 2023-02-27 NOTE — TELEPHONE ENCOUNTER
Vannesa Kenny, ASHELY CNP  You 29 minutes ago (3:13 PM)     AU[place]INA  Agree with RN recommendations.   Patient has concerns regarding shortness of breath related to asthma/COPD.   Glad to hear she is seeing her PCP next week.   Follow up with cardiology as scheduled.

## 2023-02-27 NOTE — TELEPHONE ENCOUNTER
"Called patient to review sxs reported to the Call Center.     Pt had \"SOB\" last night but could states it could be related to her cold.   Pt states \"I felt a little short a breathing but it could be my asthma. I get really nervous when I feel that way but I feel better now. I think it's the cold making the asthma worse\".     Pt also states she uses an oxygen tank at night. Not all the time but sometimes. Pt recent received a letter stating the insurance wants to discontinue the oxygen tank. Pt believes this is because she has not seen PMD or lung doctor in awhile.     Pt states that she could probably get someone from the heart clinic to sign the forms for her oxygen tank. Pt states she has hx of asthma and COPY.     Highly recommend pt see PMD or lung doctor for non-cardiac issues and questions. Pt states she has tried to call PMD and Lung doctor but they are booked out for months.     Again reviewed sxs. Pt is stable at this time. Pt would benefit from a PCP and pulmonary providers. Pt does have a follow up with Cardiology 3/23/23 to review any cardiac concerns.     ALICIA Daniels    "

## 2023-03-23 ENCOUNTER — OFFICE VISIT (OUTPATIENT)
Dept: CARDIOLOGY | Facility: CLINIC | Age: 73
End: 2023-03-23
Payer: COMMERCIAL

## 2023-03-23 VITALS
OXYGEN SATURATION: 96 % | DIASTOLIC BLOOD PRESSURE: 78 MMHG | RESPIRATION RATE: 17 BRPM | BODY MASS INDEX: 32.2 KG/M2 | WEIGHT: 164 LBS | HEIGHT: 60 IN | HEART RATE: 69 BPM | SYSTOLIC BLOOD PRESSURE: 136 MMHG

## 2023-03-23 DIAGNOSIS — R07.9 CHEST PAIN, UNSPECIFIED TYPE: Primary | ICD-10-CM

## 2023-03-23 DIAGNOSIS — G47.33 OBSTRUCTIVE SLEEP APNEA SYNDROME: ICD-10-CM

## 2023-03-23 DIAGNOSIS — E78.5 HYPERLIPIDEMIA LDL GOAL <70: ICD-10-CM

## 2023-03-23 PROCEDURE — 99214 OFFICE O/P EST MOD 30 MIN: CPT | Performed by: NURSE PRACTITIONER

## 2023-03-23 RX ORDER — ISOSORBIDE MONONITRATE 30 MG/1
15 TABLET, EXTENDED RELEASE ORAL DAILY
Qty: 45 TABLET | Refills: 1 | Status: SHIPPED | OUTPATIENT
Start: 2023-03-23 | End: 2023-10-03

## 2023-03-23 NOTE — PROGRESS NOTES
Cardiology Clinic Progress Note  Edda Mckeon MRN# [id number]   YOB: 1950 Age: 72 year old     Reason For Visit:  Follow up    Primary Cardiologist:   Dr. Boss           History of Presenting Illness:      Edda Mckeon is a pleasant 72 year old patient who carries a past medical history significant for CAD s/p KELVIN to the proximal LAD and RCA (9/2022) asthma, bronchiectasis, mitral regurgitation, dyslipidemia, palpitations, hypothyroidism and obstructive sleep apnea on CPAP.    She was last seen on 11/16/2022 in follow up to her admission for NSTEMI.  At that visit, her primary complaints were shortness of breath and mild chest tightness.  Concerning for possible interaction with Brilinta, she was transitioned to Plavix which showed mild to moderate improvement.  Since her PCI in September, she has had 2  ER evaluations for chest discomfort with a negative work-up.  She was also seen in November for dizziness and visual changes.  CT and CTA of the head and neck were normal.      She presents to the office today accompanied by her  and daughter. She continues to have intermittent episodes of chest discomfort (4 times in the last 6 weeks), relieved with sublingual nitroglycerin.  She states the episodes are not exertional and occasionally wake her from a sleep.  She notes occasional episodes of lightheadedness, unchanged from previous.  She is scheduled with neurology next month.  She is chest pain-free at present, denies shortness of breath, palpitations, PND, orthopnea, presyncope, or leg edema.  She is concerned with new onset numbness and tingling to both hands and feet.  She has noticed a new varicose vein to her left leg.  She is scheduled for follow-up with PCP in 2 weeks.    Echocardiogram (9/2022) showed a normal ejection fraction estimated at 55 to 60%, basal to mid inferior lateral, apical lateral, mid apical inferior hypokinesis, grade 1 diastolic dysfunction, and mild to  moderate TR.  .    Blood pressure is well controlled at 136/78  Last CBC and BMP were unremarkable  Lipid panel showed a total cholesterol of 159, HDL 76, LDL 71, and triglycerides 58  BMI 32.03, 164 pounds    She does not engage in any routine exercise.  She remains primarily sedentary.  She is more active with warmer weather as she is able to take her handicapped daughter outside.    Faithful with CPAP  Remains compliant with all medications.                   Assessment and Plan:     1.  Coronary artery disease -status post PCI of the proximal LAD using a 3.0 x 20 mm drug-eluting stent and PCI of the RCA using a 3.0 x 16 mm drug-eluting stent.  Occasional episodes of chest pain relieved with sublingual nitroglycerin.  Recommend low-dose isosorbide 15 mg daily, monitor for headache or hypotension.  Should episodes become more frequent or lasting longer duration, notify office or seek ER evaluation immediately if symptoms are severe.  Continue Plavix and aspirin uninterrupted until September 2023.  Continue metoprolol and statin.  Encourage exercise, weight loss, and heart healthy diet.    2.  Hyperlipidemia -on statin  3.  Hypothyroidism -follow-up with PCP for further management and assessment of hair loss  4.  Obstructive sleep apnea -on CPAP, recommend follow-up with sleep medicine               Thank you for allowing me to participate in this delightful patient's care. I have recommended she follow up in 6 months with .        ASHELY Gan CNP         Review of Systems:     Review of Systems:  Skin:  Negative     Eyes:  Negative    ENT:  Positive for earache  Respiratory:  Positive for cough;wheezing;shortness of breath;dyspnea on exertion;sleep apnea;CPAP  Cardiovascular:    Positive for;fatigue;lightheadedness;dizziness;edema  Gastroenterology: Positive for abdominal pain  Genitourinary:  not assessed    Musculoskeletal:  Positive for neck pain;back pain  Neurologic:     incoordination;numbness or tingling of feet;numbness or tingling of hands  Psychiatric:  not assessed    Heme/Lymph/Imm:  Positive for allergies  Endocrine:  Positive for thyroid disorder              Physical Exam:     GEN:  In general, this is a overweight female in no acute distress.  HEENT:  Pupils equal, round. Sclerae nonicteric. Clear oropharynx. Mucous membranes moist.  NECK:  No JVD   C/V:  Regular rate and rhythm, no murmur, rub or gallop. No S3 or RV heave.   RESP: Respirations are unlabored. No use of accessory muscles.  Expiratory wheezes bilaterally   GI: Abdomen soft, nontender, nondistended. No HSM appreciated.   EXTREM: No LE edema. No cyanosis or clubbing.  NEURO: Alert and oriented, cooperative. No obvious focal deficits.   PSYCH: Normal affect.  SKIN: Warm and dry. No rashes or petechiae appreciated.          Past Medical History:     Past Medical History:   Diagnosis Date     Palpitations      Shortness of breath               Past Surgical History:     Past Surgical History:   Procedure Laterality Date     CV CORONARY ANGIOGRAM N/A 9/19/2022    Procedure: Coronary Angiogram;  Surgeon: Rozina Tafoya MD;  Location:  HEART CARDIAC CATH LAB     CV PCI N/A 9/19/2022    Procedure: Percutaneous Coronary Intervention;  Surgeon: Rozina Tafoya MD;  Location:  HEART CARDIAC CATH LAB              Allergies:   Atorvastatin, Hmg-coa-r inhibitors, Pravastatin, and Scopolamine       Data:   All laboratory data reviewed:    LAST CHOLESTEROL:  Lab Results   Component Value Date    CHOL 159 09/19/2022     Lab Results   Component Value Date    HDL 76 09/19/2022     Lab Results   Component Value Date    LDL 71 09/19/2022     Lab Results   Component Value Date    TRIG 58 09/19/2022     No results found for: CHOLHDLRATIO    LAST BMP:  Lab Results   Component Value Date     11/05/2022     11/11/2019      Lab Results   Component Value Date    POTASSIUM 3.7 11/05/2022    POTASSIUM 3.8 11/11/2019      Lab Results   Component Value Date    CHLORIDE 110 11/05/2022    CHLORIDE 105 11/11/2019     Lab Results   Component Value Date    ELY 9.3 11/05/2022    ELY 8.9 11/11/2019     Lab Results   Component Value Date    CO2 26 11/05/2022    CO2 29 11/11/2019     Lab Results   Component Value Date    BUN 13 11/05/2022    BUN 14 11/11/2019     Lab Results   Component Value Date    CR 0.84 11/05/2022    CR 0.78 11/11/2019     Lab Results   Component Value Date     11/05/2022    GLC 88 11/11/2019       LAST CBC:  Lab Results   Component Value Date    WBC 5.8 11/05/2022    WBC 6.5 11/11/2019     Lab Results   Component Value Date    RBC 4.06 11/05/2022    RBC 4.23 11/11/2019     Lab Results   Component Value Date    HGB 13.1 11/05/2022    HGB 13.3 11/11/2019     Lab Results   Component Value Date    HCT 41.4 11/05/2022    HCT 42.5 11/11/2019     Lab Results   Component Value Date     11/05/2022     11/11/2019     Lab Results   Component Value Date    MCH 32.3 11/05/2022    MCH 31.4 11/11/2019     Lab Results   Component Value Date    MCHC 31.6 11/05/2022    MCHC 31.3 11/11/2019     Lab Results   Component Value Date    RDW 15.3 11/05/2022    RDW 14.5 11/11/2019     Lab Results   Component Value Date     11/05/2022     11/11/2019

## 2023-03-23 NOTE — PATIENT INSTRUCTIONS
Thanks for participating in a office visit with the [facility] clinic today.    Occasional chest pain over the last 6 weeks  Recommend low dose isosorbide 15 mg daily  Continue on current medical therapy   Encourage exercise, weight loss, and heart healthy diet  Follow up with Dr. Boss in September       Please call my nurse at  [phone] with any questions or concerns.    Scheduling phone number: [phone]  Reminder: Please bring in all current medications, over the counter supplements and vitamin bottles to your next appointment.

## 2023-03-23 NOTE — LETTER
3/23/2023    Emi Monk MD  [facility] [address] [place] [address]    RE: Edda Mckeon       Dear Colleague,     I had the pleasure of seeing Edda Mckeon in the [facility].  Cardiology Clinic Progress Note  Edda Mckeon MRN# [id number]   YOB: 1950 Age: 72 year old     Reason For Visit:  Follow up    Primary Cardiologist:   Dr. Boss           History of Presenting Illness:      Edda Mckeon is a pleasant 72 year old patient who carries a past medical history significant for CAD s/p KELVIN to the proximal LAD and RCA (9/2022) asthma, bronchiectasis, mitral regurgitation, dyslipidemia, palpitations, hypothyroidism and obstructive sleep apnea on CPAP.    She was last seen on 11/16/2022 in follow up to her admission for NSTEMI.  At that visit, her primary complaints were shortness of breath and mild chest tightness.  Concerning for possible interaction with Brilinta, she was transitioned to Plavix which showed mild to moderate improvement.  Since her PCI in September, she has had 2  ER evaluations for chest discomfort with a negative work-up.  She was also seen in November for dizziness and visual changes.  CT and CTA of the head and neck were normal.      She presents to the office today accompanied by her  and daughter. She continues to have intermittent episodes of chest discomfort (4 times in the last 6 weeks), relieved with sublingual nitroglycerin.  She states the episodes are not exertional and occasionally wake her from a sleep.  She notes occasional episodes of lightheadedness, unchanged from previous.  She is scheduled with neurology next month.  She is chest pain-free at present, denies shortness of breath, palpitations, PND, orthopnea, presyncope, or leg edema.  She is concerned with new onset numbness and tingling to both hands and feet.  She has noticed a new varicose vein to her left leg.  She is scheduled for follow-up  with PCP in 2 weeks.    Echocardiogram (9/2022) showed a normal ejection fraction estimated at 55 to 60%, basal to mid inferior lateral, apical lateral, mid apical inferior hypokinesis, grade 1 diastolic dysfunction, and mild to moderate TR.  .    Blood pressure is well controlled at 136/78  Last CBC and BMP were unremarkable  Lipid panel showed a total cholesterol of 159, HDL 76, LDL 71, and triglycerides 58  BMI 32.03, 164 pounds    She does not engage in any routine exercise.  She remains primarily sedentary.  She is more active with warmer weather as she is able to take her handicapped daughter outside.    Faithful with CPAP  Remains compliant with all medications.                   Assessment and Plan:     1.  Coronary artery disease -status post PCI of the proximal LAD using a 3.0 x 20 mm drug-eluting stent and PCI of the RCA using a 3.0 x 16 mm drug-eluting stent.  Occasional episodes of chest pain relieved with sublingual nitroglycerin.  Recommend low-dose isosorbide 15 mg daily, monitor for headache or hypotension.  Should episodes become more frequent or lasting longer duration, notify office or seek ER evaluation immediately if symptoms are severe.  Continue Plavix and aspirin uninterrupted until September 2023.  Continue metoprolol and statin.  Encourage exercise, weight loss, and heart healthy diet.    2.  Hyperlipidemia -on statin  3.  Hypothyroidism -follow-up with PCP for further management and assessment of hair loss  4.  Obstructive sleep apnea -on CPAP, recommend follow-up with sleep medicine               Thank you for allowing me to participate in this delightful patient's care. I have recommended she follow up in 6 months with .        Vannesa Kenny, ASHELY CNP         Review of Systems:     Review of Systems:  Skin:  Negative     Eyes:  Negative    ENT:  Positive for earache  Respiratory:  Positive for cough;wheezing;shortness of breath;dyspnea on exertion;sleep  apnea;CPAP  Cardiovascular:    Positive for;fatigue;lightheadedness;dizziness;edema  Gastroenterology: Positive for abdominal pain  Genitourinary:  not assessed    Musculoskeletal:  Positive for neck pain;back pain  Neurologic:    incoordination;numbness or tingling of feet;numbness or tingling of hands  Psychiatric:  not assessed    Heme/Lymph/Imm:  Positive for allergies  Endocrine:  Positive for thyroid disorder              Physical Exam:     GEN:  In general, this is a overweight female in no acute distress.  HEENT:  Pupils equal, round. Sclerae nonicteric. Clear oropharynx. Mucous membranes moist.  NECK:  No JVD   C/V:  Regular rate and rhythm, no murmur, rub or gallop. No S3 or RV heave.   RESP: Respirations are unlabored. No use of accessory muscles.  Expiratory wheezes bilaterally   GI: Abdomen soft, nontender, nondistended. No HSM appreciated.   EXTREM: No LE edema. No cyanosis or clubbing.  NEURO: Alert and oriented, cooperative. No obvious focal deficits.   PSYCH: Normal affect.  SKIN: Warm and dry. No rashes or petechiae appreciated.          Past Medical History:     Past Medical History:   Diagnosis Date     Palpitations      Shortness of breath               Past Surgical History:     Past Surgical History:   Procedure Laterality Date     CV CORONARY ANGIOGRAM N/A 9/19/2022    Procedure: Coronary Angiogram;  Surgeon: Rozina Tafoya MD;  Location:  HEART CARDIAC CATH LAB     CV PCI N/A 9/19/2022    Procedure: Percutaneous Coronary Intervention;  Surgeon: Rozina Tafoya MD;  Location:  HEART CARDIAC CATH LAB              Allergies:   Atorvastatin, Hmg-coa-r inhibitors, Pravastatin, and Scopolamine       Data:   All laboratory data reviewed:    LAST CHOLESTEROL:  Lab Results   Component Value Date    CHOL 159 09/19/2022     Lab Results   Component Value Date    HDL 76 09/19/2022     Lab Results   Component Value Date    LDL 71 09/19/2022     Lab Results   Component Value Date    TRIG 58  09/19/2022     No results found for: CHOLHDLRATIO    LAST BMP:  Lab Results   Component Value Date     11/05/2022     11/11/2019      Lab Results   Component Value Date    POTASSIUM 3.7 11/05/2022    POTASSIUM 3.8 11/11/2019     Lab Results   Component Value Date    CHLORIDE 110 11/05/2022    CHLORIDE 105 11/11/2019     Lab Results   Component Value Date    ELY 9.3 11/05/2022    ELY 8.9 11/11/2019     Lab Results   Component Value Date    CO2 26 11/05/2022    CO2 29 11/11/2019     Lab Results   Component Value Date    BUN 13 11/05/2022    BUN 14 11/11/2019     Lab Results   Component Value Date    CR 0.84 11/05/2022    CR 0.78 11/11/2019     Lab Results   Component Value Date     11/05/2022    GLC 88 11/11/2019       LAST CBC:  Lab Results   Component Value Date    WBC 5.8 11/05/2022    WBC 6.5 11/11/2019     Lab Results   Component Value Date    RBC 4.06 11/05/2022    RBC 4.23 11/11/2019     Lab Results   Component Value Date    HGB 13.1 11/05/2022    HGB 13.3 11/11/2019     Lab Results   Component Value Date    HCT 41.4 11/05/2022    HCT 42.5 11/11/2019     Lab Results   Component Value Date     11/05/2022     11/11/2019     Lab Results   Component Value Date    MCH 32.3 11/05/2022    MCH 31.4 11/11/2019     Lab Results   Component Value Date    MCHC 31.6 11/05/2022    MCHC 31.3 11/11/2019     Lab Results   Component Value Date    RDW 15.3 11/05/2022    RDW 14.5 11/11/2019     Lab Results   Component Value Date     11/05/2022     11/11/2019     Thank you for allowing me to participate in the care of your patient.      Sincerely,     ASHELY Gan CNP     [facility]  cc:   ASHELY Gan CNP  [address] [place] [address]

## 2023-04-01 ENCOUNTER — HEALTH MAINTENANCE LETTER (OUTPATIENT)
Age: 73
End: 2023-04-01

## 2023-05-08 ENCOUNTER — HOSPITAL ENCOUNTER (EMERGENCY)
Facility: CLINIC | Age: 73
Discharge: LEFT BEFORE DISCHARGE | End: 2023-05-08
Admitting: EMERGENCY MEDICINE
Payer: COMMERCIAL

## 2023-05-08 LAB
ATRIAL RATE - MUSE: 99 BPM
DIASTOLIC BLOOD PRESSURE - MUSE: NORMAL MMHG
INTERPRETATION ECG - MUSE: NORMAL
P AXIS - MUSE: 68 DEGREES
PR INTERVAL - MUSE: 158 MS
QRS DURATION - MUSE: 76 MS
QT - MUSE: 352 MS
QTC - MUSE: 451 MS
R AXIS - MUSE: 25 DEGREES
SYSTOLIC BLOOD PRESSURE - MUSE: NORMAL MMHG
T AXIS - MUSE: 60 DEGREES
VENTRICULAR RATE- MUSE: 99 BPM

## 2023-05-08 PROCEDURE — 999N000104 HC STATISTIC NO CHARGE

## 2023-05-08 PROCEDURE — 93005 ELECTROCARDIOGRAM TRACING: CPT

## 2023-07-13 DIAGNOSIS — I21.4 NSTEMI (NON-ST ELEVATED MYOCARDIAL INFARCTION) (H): ICD-10-CM

## 2023-07-13 RX ORDER — NITROGLYCERIN 0.4 MG/1
TABLET SUBLINGUAL
Qty: 30 TABLET | Refills: 1 | Status: SHIPPED | OUTPATIENT
Start: 2023-07-13 | End: 2023-10-19

## 2023-08-07 ENCOUNTER — TELEPHONE (OUTPATIENT)
Dept: CARDIOLOGY | Facility: CLINIC | Age: 73
End: 2023-08-07

## 2023-08-07 NOTE — TELEPHONE ENCOUNTER
[facility] Call Center    Phone Message    May a detailed message be left on voicemail: yes     Reason for Call: Other: Please follow up with the Pt as she is also having some swelling in her leg      Action Taken: Other: cardio    Travel Screening: Not Applicable  Thank you!  [facility]

## 2023-08-08 NOTE — TELEPHONE ENCOUNTER
"Call received regarding patient needing tooth extraction and is in a lot of pain. Also patient has some \"leg edema\".     Called patient to review. No answer. Left  for callback. ALICIA Daniels    "

## 2023-08-28 ENCOUNTER — TELEPHONE (OUTPATIENT)
Dept: CARDIOLOGY | Facility: CLINIC | Age: 73
End: 2023-08-28
Payer: COMMERCIAL

## 2023-08-29 NOTE — TELEPHONE ENCOUNTER
"Called patient to discuss question regarding blood thinner. No answer. Left  for callback. ALICIA Daniels    ADDENDUM 8/30/23: patient returned call. Pt states she needs to get teeth pulled and they will not do it while she is on \"blood thinners\". Pt was going to have dental work and then she had a cardiac event. It's been 1 yr with tooth ache and patient is very uncomfortable now.     Pt would like to know if she could stop her Plavix 75 mg now.     Per last OV 3/23/23 with Vannesa Kenny CNP, \" -status post PCI of the proximal LAD using a 3.0 x 20 mm drug-eluting stent and PCI of the RCA using a 3.0 x 16 mm drug-eluting stent. Continue Plavix and aspirin uninterrupted until September 2023.\"    Will route to provider to review. Patient cannot get into see  until 1/2024 (per pt). ALICIA Daniels    "

## 2023-08-30 NOTE — TELEPHONE ENCOUNTER
Called patient to review recommendations per Vannesa Kenny CNP.    Ok to stop plavix and continue with a ASA 81 mg daily. Pt agrees w/ plan. ALICIA Daniels

## 2023-08-30 NOTE — TELEPHONE ENCOUNTER
Yes, patient can discontinue Plavix and proceed with dental work.   She should continue with aspirin indefinitely.     Thanks  Vannesa

## 2023-09-29 ENCOUNTER — TRANSCRIBE ORDERS (OUTPATIENT)
Dept: OTHER | Age: 73
End: 2023-09-29

## 2023-09-29 DIAGNOSIS — K62.5 RECTAL BLEEDING: Primary | ICD-10-CM

## 2023-09-29 DIAGNOSIS — Z12.11 SPECIAL SCREENING FOR MALIGNANT NEOPLASM OF COLON: ICD-10-CM

## 2023-09-29 DIAGNOSIS — K62.89 RECTAL PAIN: ICD-10-CM

## 2023-10-03 DIAGNOSIS — R07.9 CHEST PAIN, UNSPECIFIED TYPE: ICD-10-CM

## 2023-10-03 RX ORDER — ISOSORBIDE MONONITRATE 30 MG/1
15 TABLET, EXTENDED RELEASE ORAL DAILY
Qty: 45 TABLET | Refills: 0 | Status: SHIPPED | OUTPATIENT
Start: 2023-10-03

## 2023-10-10 ENCOUNTER — TELEPHONE (OUTPATIENT)
Dept: GASTROENTEROLOGY | Facility: CLINIC | Age: 73
End: 2023-10-10
Payer: COMMERCIAL

## 2023-10-10 NOTE — TELEPHONE ENCOUNTER
"Patient having phone issues-made 2 attempts to schedule and not able to hear her and then phone disconnected both times    Endoscopy Scheduling Screen    Have you had a positive Covid test in the last 14 days?  No    Are you active on MyChart?   Yes    What insurance is in the chart?  Other:  [facility] Medicare    Ordering/Referring Provider: Emeka   (If ordering provider performs procedure, schedule with ordering provider unless otherwise instructed. )    BMI: Estimated body mass index is 32.03 kg/m  as calculated from the following:    Height as of 3/23/23: 1.524 m (5').    Weight as of 3/23/23: 74.4 kg (164 lb).     Sedation Ordered  moderate sedation.   If patient BMI > 50 do not schedule in [facility].    If patient BMI > 45 do not schedule at [facility].    Are you taking methadone or Suboxone?      Are you taking any prescription medications for pain 3 or more times per week?       Do you have a history of malignant hyperthermia or adverse reaction to anesthesia?      (Females) Are you currently pregnant?        Have you been diagnosed or told you have pulmonary hypertension?       Do you have an LVAD?      Have you been told you have moderate to severe sleep apnea?      Have you been told you have COPD, asthma, or any other lung disease?      Do you have any heart conditions?      Have you ever had an organ transplant?       Have you ever had or are you awaiting a heart or lung transplant?       Have you had a stroke or transient ischemic attack (TIA aka \"mini stroke\" in the last 6 months?       Have you been diagnosed with or been told you have cirrhosis of the liver?       Are you currently on dialysis?       Do you need assistance transferring?       BMI: Estimated body mass index is 32.03 kg/m  as calculated from the following:    Height as of 3/23/23: 1.524 m (5').    Weight as of 3/23/23: 74.4 kg (164 lb).     Is patients BMI > 40 and scheduling location [facility]?      Do you take an injectable medication for weight loss or " diabetes (excluding insulin)?      Do you take the medication Naltrexone?      Do you take blood thinners?        Prep   Are you currently on dialysis or do you have chronic kidney disease?      Do you have a diagnosis of diabetes?      Do you have a diagnosis of cystic fibrosis (CF)?      On a regular basis do you go 3 -5 days between bowel movements?      BMI > 40?      Preferred Pharmacy:    [id number] - [facility] [address] AT [facility] & [address] [place] [address]  Phone: [phone] Fax: [phone]    [facility] #[id number] - [place], [place] - [address] AT [facility] [address]  [address]  [place] [address]  Phone: [phone] Fax: [phone]      Final Scheduling Details   Colonoscopy prep sent?      Procedure scheduled      Surgeon:       Date of procedure:       Pre-OP / PAC:       Location       Sedation          Patient Reminders:   You will receive a call from a Nurse to review instructions and health history.  This assessment must be completed prior to your procedure.  Failure to complete the Nurse assessment may result in the procedure being cancelled.      On the day of your procedure, please designate an adult(s) who can drive you home stay with you for the next 24 hours. The medicines used in the exam will make you sleepy. You will not be able to drive.      You cannot take public transportation, ride share services, or non-medical taxi service without a responsible caregiver.  Medical transport services are allowed with the requirement that a responsible caregiver will receive you at your destination.  We require that drivers and caregivers are confirmed prior to your procedure.

## 2023-10-19 ENCOUNTER — TELEPHONE (OUTPATIENT)
Dept: CARDIOLOGY | Facility: CLINIC | Age: 73
End: 2023-10-19
Payer: COMMERCIAL

## 2023-10-19 DIAGNOSIS — I21.4 NSTEMI (NON-ST ELEVATED MYOCARDIAL INFARCTION) (H): ICD-10-CM

## 2023-10-19 RX ORDER — NITROGLYCERIN 0.4 MG/1
TABLET SUBLINGUAL
Qty: 30 TABLET | Refills: 0 | Status: SHIPPED | OUTPATIENT
Start: 2023-10-19

## 2023-10-19 NOTE — TELEPHONE ENCOUNTER
Patient called back to go over chest pain symptoms and need for nitroglycerin refill. Upon review, patient states that she has not been taking her Imdur as prescribed and may be attributing to her active chest pain. She rates it a 5-6/10 and goes away with SL nitroglycerin.     RN explained that PRN SL Nitroglycerin is not a long term solution and that is why she is prescribed Imdur. Through thorough explanation, patient agreed to restarting Imdur and ONLY using nitroglycerin for emergency use only.     RN also stated that if pain becomes more frequent and severe to report into the ED.    Huddled with Vannesa Kenny in clinic who agreed with plan of care. Will call patient in 1-2 weeks time to determine in increase in Imdur is needed based on symptoms.     Johana Jacques RN  October 19, 2023  5:06 PM

## 2023-10-19 NOTE — TELEPHONE ENCOUNTER
Patient called and left a voicemail in asking for a nitroglycerin refill because she is having symptoms of chest pain. Cardiac history includes chest discomfort that she was started on Imdur for.     LVM to discuss voicemail. Will wait for callback.     Johana Jacques RN  October 19, 2023  1:18 PM

## 2023-11-01 NOTE — TELEPHONE ENCOUNTER
LVM to receive updated symptoms. Will wait for callback.    Johana Jacques RN  November 1, 2023  1:15 PM

## 2023-11-08 NOTE — TELEPHONE ENCOUNTER
"Patient left a voicemail stating that she \"feels fine\" and that the chest pressure she felt was a \"fluke accident\". She also stated she is feeling better since taking her Imdur. She will follow up and call back with any other questions or concerns but for now, she is fine without a callback as it was an update to the RN's inquiry.     Johana Jacques RN  November 8, 2023  7:42 AM  "

## 2023-11-29 ENCOUNTER — TELEPHONE (OUTPATIENT)
Dept: CARDIOLOGY | Facility: CLINIC | Age: 73
End: 2023-11-29
Payer: COMMERCIAL

## 2023-11-29 DIAGNOSIS — I25.10 CORONARY ARTERY DISEASE INVOLVING NATIVE CORONARY ARTERY OF NATIVE HEART WITHOUT ANGINA PECTORIS: ICD-10-CM

## 2023-11-29 DIAGNOSIS — I21.4 NSTEMI (NON-ST ELEVATED MYOCARDIAL INFARCTION) (H): Primary | ICD-10-CM

## 2023-11-29 DIAGNOSIS — I20.0 UNSTABLE ANGINA (H): ICD-10-CM

## 2023-11-29 NOTE — TELEPHONE ENCOUNTER
[facility] Call Center    Phone Message    May a detailed message be left on voicemail: no    Reason for Call: Other: Patient called requesting to speak with a member of her care team. Patient has a colonoscopy next Tuesday. Patient would also like to mention she tore her meniscus and has started new medications. Please call patient back to further discuss.    Action Taken: Message routed to:  Other: Cardiology    Travel Screening: Not Applicable    Thank you!  [facility]

## 2023-11-30 NOTE — TELEPHONE ENCOUNTER
Patient called and wanted to know if she should hold any medications prior to her colonoscopy. From the standpoint of cardiology, she is able to proceed with the procedure. However, [facility] needs to make that determination prior to the procedure and ask if any medications need to be held or if they need specific cardiac clearance.     She will call back if she has any other questions.     Johana Jacques RN  November 30, 2023  2:05 PM

## 2024-01-13 ENCOUNTER — HEALTH MAINTENANCE LETTER (OUTPATIENT)
Age: 74
End: 2024-01-13

## 2024-03-04 DIAGNOSIS — I47.10 PAROXYSMAL SUPRAVENTRICULAR TACHYCARDIA (H): ICD-10-CM

## 2024-03-04 RX ORDER — METOPROLOL SUCCINATE 25 MG/1
25 TABLET, EXTENDED RELEASE ORAL DAILY
Qty: 90 TABLET | Refills: 0 | Status: SHIPPED | OUTPATIENT
Start: 2024-03-04

## 2024-06-01 ENCOUNTER — HEALTH MAINTENANCE LETTER (OUTPATIENT)
Age: 74
End: 2024-06-01

## 2024-12-18 NOTE — TELEPHONE ENCOUNTER
[facility] Call Center    Phone Message    May a detailed message be left on voicemail: yes     Reason for Call: Other: Pt is requesting to call back to discuss stopping her blood thinner asap.     Action Taken: Other: cardio    Travel Screening: Not Applicable    Thank you!  [facility]                                                                      normal (ped)... In no apparent distress.

## 2025-06-14 ENCOUNTER — HEALTH MAINTENANCE LETTER (OUTPATIENT)
Age: 75
End: 2025-06-14

## (undated) DEVICE — SLEEVE TR BAND RADIAL COMPRESSION DEVICE 24CM TRB24-REG

## (undated) DEVICE — CATH LAUNCHER 6FR EBU 3.5 LA6EBU35

## (undated) DEVICE — CATH BALLOON NC EMERGE 2.50X12MM H7493926712250

## (undated) DEVICE — INTRO GLIDESHEATH SLENDER 6FR 10X45CM 60-1060

## (undated) DEVICE — Device

## (undated) DEVICE — CATH BALLOON NC EMERGE 3.75X12MM H7493926712370

## (undated) DEVICE — 5FR X 100CM INFINITI TL DIAGNOSTIC CATHETER, JUDKINS LEFT CORONARY, JL 3.5, FEMORAL SELECTIVE THRULUMEN, SMALL, 0.038IN MAX GUIDEWIRE (EA/1)

## (undated) DEVICE — SLED PULLBACK DISPOSABLE A70200

## (undated) DEVICE — 5FR X 100CM INFINITI TL DIAGNOSTIC CATHETER, JUDKINS RIGHT CORONARY, JR 4, FEMORAL SELECTIVE THRULUMEN, STANDARD, 0.038IN MAX GUIDEWIRE (EA/1)

## (undated) DEVICE — TOTE ANGIO CORP PC15AT SAN32CC83O

## (undated) DEVICE — CATH ANGIO INFINITI JR4 4FRX100CM 538421

## (undated) DEVICE — KIT HAND CONTROL ANGIOTOUCH ACIST 65CM AT-P65

## (undated) DEVICE — GUIDEWIRE VASC 0.014INX180CM RUNTHROUGH 25-1011

## (undated) DEVICE — CATH BALLOON EMERGE 2.5X15MM H7493918915250

## (undated) DEVICE — VALVE HEMOSTASIS .096" COPILOT MECH 1003331

## (undated) DEVICE — CATH IVUS OPTICROSS HD 6 3.6FR 1.18MM DIA 135CML H7493935408

## (undated) DEVICE — CATH BLN NC EUPHORA RX 2.5 X 8

## (undated) DEVICE — INFL DVC BASIXCOMPAK PLYCRB 30 ATM 13IN 20ML IN4530

## (undated) DEVICE — DEFIB PRO-PADZ LVP LQD GEL ADULT 8900-2105-01

## (undated) DEVICE — CATH BALLOON NC EMERGE 2.50X15MM H7493926715250

## (undated) DEVICE — MANIFOLD KIT ANGIO AUTOMATED 014613

## (undated) DEVICE — CATH RX TAKERU OTW PTCA BALLOON 1.5X15MM DC-RY1515UA1

## (undated) DEVICE — CATH BALLOON NC EUPHORA 3.00X12MM NCEUP3012X

## (undated) DEVICE — CATH LAUNCHER 6FR JR 4.0 LA6JR40

## (undated) DEVICE — LINE MONITOR NASAL SMART CAPNOLINE ADULT LONG 12463

## (undated) DEVICE — CATH GUIDELINER 6FR 5571

## (undated) DEVICE — CATH RX TAKERU PTCA BALLOON 2.00MM X 12MM

## (undated) RX ORDER — FENTANYL CITRATE 50 UG/ML
INJECTION, SOLUTION INTRAMUSCULAR; INTRAVENOUS
Status: DISPENSED
Start: 2022-09-19

## (undated) RX ORDER — REGADENOSON 0.08 MG/ML
INJECTION, SOLUTION INTRAVENOUS
Status: DISPENSED
Start: 2018-11-01

## (undated) RX ORDER — VERAPAMIL HYDROCHLORIDE 2.5 MG/ML
INJECTION, SOLUTION INTRAVENOUS
Status: DISPENSED
Start: 2022-09-19

## (undated) RX ORDER — HEPARIN SODIUM 1000 [USP'U]/ML
INJECTION, SOLUTION INTRAVENOUS; SUBCUTANEOUS
Status: DISPENSED
Start: 2022-09-19

## (undated) RX ORDER — CAFFEINE CITRATE 20 MG/ML
SOLUTION INTRAVENOUS
Status: DISPENSED
Start: 2018-11-01

## (undated) RX ORDER — NITROGLYCERIN 5 MG/ML
VIAL (ML) INTRAVENOUS
Status: DISPENSED
Start: 2022-09-19

## (undated) RX ORDER — HEPARIN SODIUM 200 [USP'U]/100ML
INJECTION, SOLUTION INTRAVENOUS
Status: DISPENSED
Start: 2022-09-19

## (undated) RX ORDER — LIDOCAINE HYDROCHLORIDE 10 MG/ML
INJECTION, SOLUTION EPIDURAL; INFILTRATION; INTRACAUDAL; PERINEURAL
Status: DISPENSED
Start: 2022-09-19